# Patient Record
Sex: FEMALE | Race: WHITE | NOT HISPANIC OR LATINO | Employment: OTHER | URBAN - METROPOLITAN AREA
[De-identification: names, ages, dates, MRNs, and addresses within clinical notes are randomized per-mention and may not be internally consistent; named-entity substitution may affect disease eponyms.]

---

## 2017-03-20 ENCOUNTER — HISTORICAL (OUTPATIENT)
Dept: LAB | Facility: HOSPITAL | Age: 82
End: 2017-03-20

## 2017-08-31 ENCOUNTER — HISTORICAL (OUTPATIENT)
Dept: LAB | Facility: HOSPITAL | Age: 82
End: 2017-08-31

## 2017-08-31 LAB
ABS NEUT (OLG): 4.7 X10(3)/MCL (ref 2.1–9.2)
ALBUMIN SERPL-MCNC: 3.7 GM/DL (ref 3.4–5)
ALBUMIN/GLOB SERPL: 1.3 RATIO (ref 1.1–2)
ALP SERPL-CCNC: 78 UNIT/L (ref 46–116)
ALT SERPL-CCNC: 15 UNIT/L (ref 12–78)
AST SERPL-CCNC: 13 UNIT/L (ref 15–37)
BASOPHILS # BLD AUTO: 0.1 X10(3)/MCL
BASOPHILS NFR BLD AUTO: 1 % (ref 0–2)
BILIRUB SERPL-MCNC: 0.5 MG/DL (ref 0.2–1)
BILIRUBIN DIRECT+TOT PNL SERPL-MCNC: 0.13 MG/DL (ref 0–0.2)
BILIRUBIN DIRECT+TOT PNL SERPL-MCNC: 0.37 MG/DL (ref 0–0.8)
BUN SERPL-MCNC: 16.2 MG/DL (ref 7–18)
CALCIUM SERPL-MCNC: 9.5 MG/DL (ref 8.5–10.1)
CHLORIDE SERPL-SCNC: 109 MMOL/L (ref 98–107)
CHOLEST SERPL-MCNC: 163 MG/DL (ref 0–200)
CHOLEST/HDLC SERPL: 2.3 {RATIO} (ref 0–4)
CO2 SERPL-SCNC: 26.8 MMOL/L (ref 21–32)
CREAT SERPL-MCNC: 0.94 MG/DL (ref 0.6–1.3)
DEPRECATED CALCIDIOL+CALCIFEROL SERPL-MC: 34.9 NG/ML (ref 30–80)
EOSINOPHIL # BLD AUTO: 0.1 X10(3)/MCL
EOSINOPHIL NFR BLD AUTO: 1 %
ERYTHROCYTE [DISTWIDTH] IN BLOOD BY AUTOMATED COUNT: 16.4 % (ref 11.5–17)
EST. AVERAGE GLUCOSE BLD GHB EST-MCNC: 111 MG/DL
GLOBULIN SER-MCNC: 2.9 GM/DL (ref 2.4–3.5)
GLUCOSE SERPL-MCNC: 86 MG/DL (ref 74–106)
HBA1C MFR BLD: 5.5 % (ref 4.5–6.2)
HCT VFR BLD AUTO: 36.7 % (ref 37–47)
HDLC SERPL-MCNC: 72 MG/DL (ref 40–60)
HGB BLD-MCNC: 12.2 GM/DL (ref 12–16)
LDLC SERPL CALC-MCNC: 71 MG/DL (ref 0–129)
LYMPHOCYTES # BLD AUTO: 2.3 X10(3)/MCL
LYMPHOCYTES NFR BLD AUTO: 30 % (ref 13–40)
MCH RBC QN AUTO: 30.2 PG (ref 27–31)
MCHC RBC AUTO-ENTMCNC: 33.2 GM/DL (ref 33–36)
MCV RBC AUTO: 91.1 FL (ref 80–94)
MONOCYTES # BLD AUTO: 0.7 X10(3)/MCL
MONOCYTES NFR BLD AUTO: 9 % (ref 2–11)
NEUTROPHILS # BLD AUTO: 4.7 X10(3)/MCL (ref 2.1–9.2)
NEUTROPHILS NFR BLD AUTO: 60 % (ref 47–80)
PLATELET # BLD AUTO: 245 X10(3)/MCL (ref 130–400)
PMV BLD AUTO: 8 FL (ref 7.4–10.4)
POTASSIUM SERPL-SCNC: 4 MMOL/L (ref 3.5–5.1)
PROT SERPL-MCNC: 6.6 GM/DL (ref 6.4–8.2)
RBC # BLD AUTO: 4.03 X10(6)/MCL (ref 4.2–5.4)
SODIUM SERPL-SCNC: 146 MMOL/L (ref 136–145)
T4 FREE SERPL-MCNC: 1.03 NG/DL (ref 0.76–1.46)
TRIGL SERPL-MCNC: 101 MG/DL
TSH SERPL-ACNC: 1.02 MIU/ML (ref 0.36–3.74)
VLDLC SERPL CALC-MCNC: 20 MG/DL
WBC # SPEC AUTO: 7.9 X10(3)/MCL (ref 4.5–11.5)

## 2017-10-12 ENCOUNTER — HISTORICAL (OUTPATIENT)
Dept: LAB | Facility: HOSPITAL | Age: 82
End: 2017-10-12

## 2017-10-12 LAB
BUN SERPL-MCNC: 17.5 MG/DL (ref 7–18)
CALCIUM SERPL-MCNC: 9.7 MG/DL (ref 8.5–10.1)
CHLORIDE SERPL-SCNC: 106 MMOL/L (ref 98–107)
CO2 SERPL-SCNC: 28.3 MMOL/L (ref 21–32)
CREAT SERPL-MCNC: 0.98 MG/DL (ref 0.6–1.3)
GLUCOSE SERPL-MCNC: 98 MG/DL (ref 74–106)
POTASSIUM SERPL-SCNC: 3.7 MMOL/L (ref 3.5–5.1)
SODIUM SERPL-SCNC: 143 MMOL/L (ref 136–145)

## 2017-10-25 ENCOUNTER — HISTORICAL (OUTPATIENT)
Dept: LAB | Facility: HOSPITAL | Age: 82
End: 2017-10-25

## 2017-10-25 LAB
BUN SERPL-MCNC: 19.1 MG/DL (ref 7–18)
CALCIUM SERPL-MCNC: 9.8 MG/DL (ref 8.5–10.1)
CHLORIDE SERPL-SCNC: 104 MMOL/L (ref 98–107)
CO2 SERPL-SCNC: 27.2 MMOL/L (ref 21–32)
CREAT SERPL-MCNC: 1.25 MG/DL (ref 0.6–1.3)
ERYTHROCYTE [DISTWIDTH] IN BLOOD BY AUTOMATED COUNT: 15.7 % (ref 11.5–17)
GLUCOSE SERPL-MCNC: 97 MG/DL (ref 74–106)
HCT VFR BLD AUTO: 33.4 % (ref 37–47)
HGB BLD-MCNC: 10.9 GM/DL (ref 12–16)
IRON SATN MFR SERPL: 5.7 % (ref 20–50)
IRON SERPL-MCNC: 26 MCG/DL (ref 50–175)
MCH RBC QN AUTO: 29.2 PG (ref 27–31)
MCHC RBC AUTO-ENTMCNC: 32.8 GM/DL (ref 33–36)
MCV RBC AUTO: 89.2 FL (ref 80–94)
PLATELET # BLD AUTO: 328 X10(3)/MCL (ref 130–400)
PMV BLD AUTO: 7.5 FL (ref 7.4–10.4)
POTASSIUM SERPL-SCNC: 4 MMOL/L (ref 3.5–5.1)
RBC # BLD AUTO: 3.74 X10(6)/MCL (ref 4.2–5.4)
SODIUM SERPL-SCNC: 141 MMOL/L (ref 136–145)
TIBC SERPL-MCNC: 461 MCG/DL (ref 250–450)
TRANSFERRIN SERPL-MCNC: 331 MG/DL (ref 200–360)
WBC # SPEC AUTO: 7.9 X10(3)/MCL (ref 4.5–11.5)

## 2017-11-14 ENCOUNTER — HISTORICAL (OUTPATIENT)
Dept: LAB | Facility: HOSPITAL | Age: 82
End: 2017-11-14

## 2017-11-14 LAB
BUN SERPL-MCNC: 21 MG/DL (ref 7–18)
CALCIUM SERPL-MCNC: 9.7 MG/DL (ref 8.5–10.1)
CHLORIDE SERPL-SCNC: 103 MMOL/L (ref 98–107)
CO2 SERPL-SCNC: 29.9 MMOL/L (ref 21–32)
CREAT SERPL-MCNC: 1.28 MG/DL (ref 0.6–1.3)
GLUCOSE SERPL-MCNC: 98 MG/DL (ref 74–106)
POTASSIUM SERPL-SCNC: 3.6 MMOL/L (ref 3.5–5.1)
SODIUM SERPL-SCNC: 141 MMOL/L (ref 136–145)

## 2017-12-14 ENCOUNTER — HISTORICAL (OUTPATIENT)
Dept: LAB | Facility: HOSPITAL | Age: 82
End: 2017-12-14

## 2017-12-14 LAB
BUN SERPL-MCNC: 45.5 MG/DL (ref 7–18)
CALCIUM SERPL-MCNC: 10 MG/DL (ref 8.5–10.1)
CHLORIDE SERPL-SCNC: 104 MMOL/L (ref 98–107)
CO2 SERPL-SCNC: 29.9 MMOL/L (ref 21–32)
CREAT SERPL-MCNC: 1.87 MG/DL (ref 0.6–1.3)
GLUCOSE SERPL-MCNC: 123 MG/DL (ref 74–106)
MAGNESIUM SERPL-MCNC: 1.5 MG/DL (ref 1.8–2.4)
POTASSIUM SERPL-SCNC: 4 MMOL/L (ref 3.5–5.1)
SODIUM SERPL-SCNC: 144 MMOL/L (ref 136–145)

## 2018-01-15 ENCOUNTER — HISTORICAL (OUTPATIENT)
Dept: LAB | Facility: HOSPITAL | Age: 83
End: 2018-01-15

## 2018-01-15 LAB
BUN SERPL-MCNC: 86.8 MG/DL (ref 7–18)
CALCIUM SERPL-MCNC: 9.6 MG/DL (ref 8.5–10.1)
CHLORIDE SERPL-SCNC: 106 MMOL/L (ref 98–107)
CO2 SERPL-SCNC: 26.1 MMOL/L (ref 21–32)
CREAT SERPL-MCNC: 3.61 MG/DL (ref 0.6–1.3)
GLUCOSE SERPL-MCNC: 108 MG/DL (ref 74–106)
MAGNESIUM SERPL-MCNC: 1.9 MG/DL (ref 1.8–2.4)
POTASSIUM SERPL-SCNC: 3.8 MMOL/L (ref 3.5–5.1)
SODIUM SERPL-SCNC: 143 MMOL/L (ref 136–145)

## 2018-01-31 LAB
ABS NEUT (OLG): 5.6 X10(3)/MCL (ref 2.1–9.2)
BASOPHILS # BLD AUTO: 0.1 X10(3)/MCL
BASOPHILS NFR BLD AUTO: 1 % (ref 0–2)
BUN SERPL-MCNC: 28.3 MG/DL (ref 7–18)
CALCIUM SERPL-MCNC: 9.5 MG/DL (ref 8.5–10.1)
CHLORIDE SERPL-SCNC: 102 MMOL/L (ref 98–107)
CO2 SERPL-SCNC: 35.1 MMOL/L (ref 21–32)
CREAT SERPL-MCNC: 1.22 MG/DL (ref 0.6–1.3)
DIGOXIN SERPL-MCNC: 1 NG/ML (ref 0.9–2)
EOSINOPHIL # BLD AUTO: 0.1 X10(3)/MCL
EOSINOPHIL NFR BLD AUTO: 1 %
ERYTHROCYTE [DISTWIDTH] IN BLOOD BY AUTOMATED COUNT: 21.9 % (ref 11.5–17)
GLUCOSE SERPL-MCNC: 99 MG/DL (ref 74–106)
HCT VFR BLD AUTO: 39.1 % (ref 37–47)
HGB BLD-MCNC: 12.5 GM/DL (ref 12–16)
LYMPHOCYTES # BLD AUTO: 1.7 X10(3)/MCL
LYMPHOCYTES NFR BLD AUTO: 21 % (ref 13–40)
MAGNESIUM SERPL-MCNC: 1.6 MG/DL (ref 1.8–2.4)
MCH RBC QN AUTO: 27.7 PG (ref 27–31)
MCHC RBC AUTO-ENTMCNC: 32 GM/DL (ref 33–36)
MCV RBC AUTO: 86.6 FL (ref 80–94)
MONOCYTES # BLD AUTO: 0.5 X10(3)/MCL
MONOCYTES NFR BLD AUTO: 7 % (ref 2–11)
NEUTROPHILS # BLD AUTO: 5.6 X10(3)/MCL (ref 2.1–9.2)
NEUTROPHILS NFR BLD AUTO: 71 % (ref 47–80)
PLATELET # BLD AUTO: 162 X10(3)/MCL (ref 130–400)
PMV BLD AUTO: 9.2 FL (ref 7.4–10.4)
POTASSIUM SERPL-SCNC: 4.8 MMOL/L (ref 3.5–5.1)
RBC # BLD AUTO: 4.51 X10(6)/MCL (ref 4.2–5.4)
SODIUM SERPL-SCNC: 140 MMOL/L (ref 136–145)
WBC # SPEC AUTO: 8.2 X10(3)/MCL (ref 4.5–11.5)

## 2018-02-01 ENCOUNTER — HISTORICAL (OUTPATIENT)
Dept: ADMINISTRATIVE | Facility: HOSPITAL | Age: 83
End: 2018-02-01

## 2018-02-01 LAB
ABS NEUT (OLG): 4.9 X10(3)/MCL (ref 2.1–9.2)
ALBUMIN SERPL-MCNC: 3.1 GM/DL (ref 3.4–5)
ALBUMIN/GLOB SERPL: 1.2 RATIO (ref 1.1–2)
ALP SERPL-CCNC: 69 UNIT/L (ref 46–116)
ALT SERPL-CCNC: 23 UNIT/L (ref 12–78)
AST SERPL-CCNC: 21 UNIT/L (ref 15–37)
BASOPHILS # BLD AUTO: 0.1 X10(3)/MCL
BASOPHILS NFR BLD AUTO: 1 % (ref 0–2)
BILIRUB SERPL-MCNC: 1.7 MG/DL (ref 0.2–1)
BILIRUBIN DIRECT+TOT PNL SERPL-MCNC: 0.61 MG/DL (ref 0–0.8)
BILIRUBIN DIRECT+TOT PNL SERPL-MCNC: 1.09 MG/DL (ref 0–0.2)
BUN SERPL-MCNC: 29.8 MG/DL (ref 7–18)
CALCIUM SERPL-MCNC: 9.4 MG/DL (ref 8.5–10.1)
CHLORIDE SERPL-SCNC: 101 MMOL/L (ref 98–107)
CO2 SERPL-SCNC: 34.6 MMOL/L (ref 21–32)
CREAT SERPL-MCNC: 1.19 MG/DL (ref 0.6–1.3)
EOSINOPHIL # BLD AUTO: 0.1 X10(3)/MCL
EOSINOPHIL NFR BLD AUTO: 1 %
ERYTHROCYTE [DISTWIDTH] IN BLOOD BY AUTOMATED COUNT: 21.8 % (ref 11.5–17)
GLOBULIN SER-MCNC: 2.6 GM/DL (ref 2.4–3.5)
GLUCOSE SERPL-MCNC: 101 MG/DL (ref 74–106)
HCT VFR BLD AUTO: 37.6 % (ref 37–47)
HGB BLD-MCNC: 12.1 GM/DL (ref 12–16)
INR PPP: 1.31 (ref 2–3)
LYMPHOCYTES # BLD AUTO: 1.4 X10(3)/MCL
LYMPHOCYTES NFR BLD AUTO: 20 % (ref 13–40)
MCH RBC QN AUTO: 27.6 PG (ref 27–31)
MCHC RBC AUTO-ENTMCNC: 32.2 GM/DL (ref 33–36)
MCV RBC AUTO: 85.7 FL (ref 80–94)
MONOCYTES # BLD AUTO: 0.5 X10(3)/MCL
MONOCYTES NFR BLD AUTO: 7 % (ref 2–11)
NEUTROPHILS # BLD AUTO: 4.9 X10(3)/MCL (ref 2.1–9.2)
NEUTROPHILS NFR BLD AUTO: 71 % (ref 47–80)
PLATELET # BLD AUTO: 183 X10(3)/MCL (ref 130–400)
PMV BLD AUTO: 9.1 FL (ref 7.4–10.4)
POTASSIUM SERPL-SCNC: 3.8 MMOL/L (ref 3.5–5.1)
PROT SERPL-MCNC: 5.7 GM/DL (ref 6.4–8.2)
PROTHROMBIN TIME: 13.6 SECOND(S) (ref 9.3–11.4)
RBC # BLD AUTO: 4.38 X10(6)/MCL (ref 4.2–5.4)
SODIUM SERPL-SCNC: 139 MMOL/L (ref 136–145)
WBC # SPEC AUTO: 6.9 X10(3)/MCL (ref 4.5–11.5)

## 2018-02-05 LAB
ABS NEUT (OLG): 5 X10(3)/MCL (ref 2.1–9.2)
ALBUMIN SERPL-MCNC: 3.1 GM/DL (ref 3.4–5)
ALBUMIN/GLOB SERPL: 1.1 RATIO (ref 1.1–2)
ALP SERPL-CCNC: 80 UNIT/L (ref 46–116)
ALT SERPL-CCNC: 23 UNIT/L (ref 12–78)
ANISOCYTOSIS BLD QL SMEAR: NORMAL
AST SERPL-CCNC: 27 UNIT/L (ref 15–37)
BASOPHILS # BLD AUTO: 0.1 X10(3)/MCL
BASOPHILS NFR BLD AUTO: 1 % (ref 0–2)
BILIRUB SERPL-MCNC: 1.6 MG/DL (ref 0.2–1)
BILIRUBIN DIRECT+TOT PNL SERPL-MCNC: 0.58 MG/DL (ref 0–0.8)
BILIRUBIN DIRECT+TOT PNL SERPL-MCNC: 1 MG/DL (ref 0–0.2)
BNP BLD-MCNC: ABNORMAL PG/ML (ref 0–125)
BUN SERPL-MCNC: 23.1 MG/DL (ref 7–18)
CALCIUM SERPL-MCNC: 9.4 MG/DL (ref 8.5–10.1)
CHLORIDE SERPL-SCNC: 104 MMOL/L (ref 98–107)
CO2 SERPL-SCNC: 34 MMOL/L (ref 21–32)
CREAT SERPL-MCNC: 1.05 MG/DL (ref 0.6–1.3)
EOSINOPHIL NFR BLD AUTO: 0 %
ERYTHROCYTE [DISTWIDTH] IN BLOOD BY AUTOMATED COUNT: 21.6 % (ref 11.5–17)
GLOBULIN SER-MCNC: 2.7 GM/DL (ref 2.4–3.5)
GLUCOSE SERPL-MCNC: 88 MG/DL (ref 74–106)
HCT VFR BLD AUTO: 37.8 % (ref 37–47)
HGB BLD-MCNC: 12.2 GM/DL (ref 12–16)
LYMPHOCYTES # BLD AUTO: 1.5 X10(3)/MCL
LYMPHOCYTES NFR BLD AUTO: 20 % (ref 13–40)
MAGNESIUM SERPL-MCNC: 1.6 MG/DL (ref 1.8–2.4)
MCH RBC QN AUTO: 28.1 PG (ref 27–31)
MCHC RBC AUTO-ENTMCNC: 32.2 GM/DL (ref 33–36)
MCV RBC AUTO: 87.1 FL (ref 80–94)
MONOCYTES # BLD AUTO: 0.8 X10(3)/MCL
MONOCYTES NFR BLD AUTO: 11 % (ref 2–11)
NEUTROPHILS # BLD AUTO: 5 X10(3)/MCL (ref 2.1–9.2)
NEUTROPHILS NFR BLD AUTO: 68 % (ref 47–80)
PLATELET # BLD AUTO: 224 X10(3)/MCL (ref 130–400)
PLATELET # BLD EST: ADEQUATE 10*3/UL
PMV BLD AUTO: 8.6 FL (ref 7.4–10.4)
POTASSIUM SERPL-SCNC: 4 MMOL/L (ref 3.5–5.1)
PROT SERPL-MCNC: 5.8 GM/DL (ref 6.4–8.2)
RBC # BLD AUTO: 4.34 X10(6)/MCL (ref 4.2–5.4)
SODIUM SERPL-SCNC: 142 MMOL/L (ref 136–145)
WBC # SPEC AUTO: 7.4 X10(3)/MCL (ref 4.5–11.5)

## 2018-02-06 LAB
BUN SERPL-MCNC: 25 MG/DL (ref 7–18)
CALCIUM SERPL-MCNC: 9.3 MG/DL (ref 8.5–10.1)
CHLORIDE SERPL-SCNC: 102 MMOL/L (ref 98–107)
CO2 SERPL-SCNC: 35.5 MMOL/L (ref 21–32)
CREAT SERPL-MCNC: 1.11 MG/DL (ref 0.6–1.3)
GLUCOSE SERPL-MCNC: 92 MG/DL (ref 74–106)
POTASSIUM SERPL-SCNC: 3.6 MMOL/L (ref 3.5–5.1)
SODIUM SERPL-SCNC: 144 MMOL/L (ref 136–145)

## 2018-02-07 LAB
BNP BLD-MCNC: ABNORMAL PG/ML (ref 0–125)
BUN SERPL-MCNC: 26 MG/DL (ref 7–18)
CALCIUM SERPL-MCNC: 9.2 MG/DL (ref 8.5–10.1)
CHLORIDE SERPL-SCNC: 101 MMOL/L (ref 98–107)
CO2 SERPL-SCNC: 33.8 MMOL/L (ref 21–32)
CREAT SERPL-MCNC: 1.02 MG/DL (ref 0.6–1.3)
GLUCOSE SERPL-MCNC: 81 MG/DL (ref 74–106)
MAGNESIUM SERPL-MCNC: 1.3 MG/DL (ref 1.8–2.4)
POTASSIUM SERPL-SCNC: 3.8 MMOL/L (ref 3.5–5.1)
SODIUM SERPL-SCNC: 143 MMOL/L (ref 136–145)

## 2018-02-08 LAB
BUN SERPL-MCNC: 30.8 MG/DL (ref 7–18)
CALCIUM SERPL-MCNC: 9.6 MG/DL (ref 8.5–10.1)
CHLORIDE SERPL-SCNC: 103 MMOL/L (ref 98–107)
CO2 SERPL-SCNC: 35.7 MMOL/L (ref 21–32)
CREAT SERPL-MCNC: 1.1 MG/DL (ref 0.6–1.3)
GLUCOSE SERPL-MCNC: 86 MG/DL (ref 74–106)
MAGNESIUM SERPL-MCNC: 1.9 MG/DL (ref 1.8–2.4)
POTASSIUM SERPL-SCNC: 3.9 MMOL/L (ref 3.5–5.1)
SODIUM SERPL-SCNC: 144 MMOL/L (ref 136–145)

## 2018-02-21 ENCOUNTER — HISTORICAL (OUTPATIENT)
Dept: LAB | Facility: HOSPITAL | Age: 83
End: 2018-02-21

## 2018-02-21 LAB
ABS NEUT (OLG): 4.68 X10(3)/MCL (ref 2.1–9.2)
ALBUMIN SERPL-MCNC: 3.5 GM/DL (ref 3.4–5)
ALBUMIN/GLOB SERPL: 1.1 RATIO (ref 1.1–2)
ALP SERPL-CCNC: 81 UNIT/L (ref 38–126)
ALT SERPL-CCNC: 19 UNIT/L (ref 12–78)
AST SERPL-CCNC: 24 UNIT/L (ref 15–37)
BASOPHILS # BLD AUTO: 0 X10(3)/MCL (ref 0–0.2)
BASOPHILS NFR BLD AUTO: 1 %
BILIRUB SERPL-MCNC: 0.8 MG/DL (ref 0.2–1)
BILIRUBIN DIRECT+TOT PNL SERPL-MCNC: 0.3 MG/DL (ref 0–0.8)
BILIRUBIN DIRECT+TOT PNL SERPL-MCNC: 0.5 MG/DL (ref 0–0.5)
BUN SERPL-MCNC: 36 MG/DL (ref 7–18)
CALCIUM SERPL-MCNC: 9.8 MG/DL (ref 8.5–10.1)
CHLORIDE SERPL-SCNC: 101 MMOL/L (ref 98–107)
CO2 SERPL-SCNC: 27 MMOL/L (ref 21–32)
CREAT SERPL-MCNC: 1.68 MG/DL (ref 0.55–1.02)
EOSINOPHIL # BLD AUTO: 0.1 X10(3)/MCL (ref 0–0.9)
EOSINOPHIL NFR BLD AUTO: 1 %
ERYTHROCYTE [DISTWIDTH] IN BLOOD BY AUTOMATED COUNT: 20.5 % (ref 11.5–17)
GLOBULIN SER-MCNC: 3.3 GM/DL (ref 2.4–3.5)
GLUCOSE SERPL-MCNC: 83 MG/DL (ref 74–106)
HCT VFR BLD AUTO: 40.1 % (ref 37–47)
HGB BLD-MCNC: 12.8 GM/DL (ref 12–16)
LYMPHOCYTES # BLD AUTO: 2.5 X10(3)/MCL (ref 0.6–4.6)
LYMPHOCYTES NFR BLD AUTO: 31 %
MCH RBC QN AUTO: 27.6 PG (ref 27–31)
MCHC RBC AUTO-ENTMCNC: 31.9 GM/DL (ref 33–36)
MCV RBC AUTO: 86.6 FL (ref 80–94)
MONOCYTES # BLD AUTO: 0.7 X10(3)/MCL (ref 0.1–1.3)
MONOCYTES NFR BLD AUTO: 9 %
NEUTROPHILS # BLD AUTO: 4.68 X10(3)/MCL (ref 1.4–7.9)
NEUTROPHILS NFR BLD AUTO: 58 %
PLATELET # BLD AUTO: 283 X10(3)/MCL (ref 130–400)
PMV BLD AUTO: 10.7 FL (ref 9.4–12.4)
POTASSIUM SERPL-SCNC: 4.8 MMOL/L (ref 3.5–5.1)
PROT SERPL-MCNC: 6.8 GM/DL (ref 6.4–8.2)
RBC # BLD AUTO: 4.63 X10(6)/MCL (ref 4.2–5.4)
SODIUM SERPL-SCNC: 137 MMOL/L (ref 136–145)
T4 FREE SERPL-MCNC: 1.05 NG/DL (ref 0.76–1.46)
TSH SERPL-ACNC: 2.01 MIU/ML (ref 0.36–3.74)
WBC # SPEC AUTO: 8.1 X10(3)/MCL (ref 4.5–11.5)

## 2018-03-02 ENCOUNTER — HISTORICAL (OUTPATIENT)
Dept: LAB | Facility: HOSPITAL | Age: 83
End: 2018-03-02

## 2018-03-02 LAB
ALBUMIN SERPL-MCNC: 3.6 GM/DL (ref 3.4–5)
ALBUMIN/GLOB SERPL: 1.1 RATIO (ref 1.1–2)
ALP SERPL-CCNC: 82 UNIT/L (ref 46–116)
ALT SERPL-CCNC: 27 UNIT/L (ref 12–78)
AST SERPL-CCNC: 24 UNIT/L (ref 15–37)
BILIRUB SERPL-MCNC: 0.9 MG/DL (ref 0.2–1)
BILIRUBIN DIRECT+TOT PNL SERPL-MCNC: 0.42 MG/DL (ref 0–0.8)
BILIRUBIN DIRECT+TOT PNL SERPL-MCNC: 0.44 MG/DL (ref 0–0.2)
BUN SERPL-MCNC: 58.9 MG/DL (ref 7–18)
CALCIUM SERPL-MCNC: 10.1 MG/DL (ref 8.5–10.1)
CHLORIDE SERPL-SCNC: 102 MMOL/L (ref 98–107)
CO2 SERPL-SCNC: 29.8 MMOL/L (ref 21–32)
CREAT SERPL-MCNC: 1.5 MG/DL (ref 0.6–1.3)
GLOBULIN SER-MCNC: 3.4 GM/DL (ref 2.4–3.5)
GLUCOSE SERPL-MCNC: 79 MG/DL (ref 74–106)
POTASSIUM SERPL-SCNC: 5.1 MMOL/L (ref 3.5–5.1)
PROT SERPL-MCNC: 7 GM/DL (ref 6.4–8.2)
SODIUM SERPL-SCNC: 138 MMOL/L (ref 136–145)

## 2018-03-08 ENCOUNTER — HISTORICAL (OUTPATIENT)
Dept: LAB | Facility: HOSPITAL | Age: 83
End: 2018-03-08

## 2018-03-08 LAB
BUN SERPL-MCNC: 49.3 MG/DL (ref 7–18)
CALCIUM SERPL-MCNC: 10.3 MG/DL (ref 8.5–10.1)
CHLORIDE SERPL-SCNC: 102 MMOL/L (ref 98–107)
CO2 SERPL-SCNC: 29.4 MMOL/L (ref 21–32)
CREAT SERPL-MCNC: 1.38 MG/DL (ref 0.6–1.3)
CREAT/UREA NIT SERPL: 36
GLUCOSE SERPL-MCNC: 88 MG/DL (ref 74–106)
POTASSIUM SERPL-SCNC: 4.7 MMOL/L (ref 3.5–5.1)
SODIUM SERPL-SCNC: 139 MMOL/L (ref 136–145)

## 2018-04-05 ENCOUNTER — HISTORICAL (OUTPATIENT)
Dept: LAB | Facility: HOSPITAL | Age: 83
End: 2018-04-05

## 2018-04-05 LAB
ABS NEUT (OLG): 5.5 X10(3)/MCL (ref 2.1–9.2)
ALBUMIN SERPL-MCNC: 3.5 GM/DL (ref 3.4–5)
ALBUMIN/GLOB SERPL: 1.2 RATIO (ref 1.1–2)
ALP SERPL-CCNC: 56 UNIT/L (ref 46–116)
ALT SERPL-CCNC: 23 UNIT/L (ref 12–78)
AST SERPL-CCNC: 20 UNIT/L (ref 15–37)
BASOPHILS # BLD AUTO: 0.1 X10(3)/MCL
BASOPHILS NFR BLD AUTO: 1 % (ref 0–2)
BILIRUB SERPL-MCNC: 0.7 MG/DL (ref 0.2–1)
BILIRUBIN DIRECT+TOT PNL SERPL-MCNC: 0.28 MG/DL (ref 0–0.2)
BILIRUBIN DIRECT+TOT PNL SERPL-MCNC: 0.42 MG/DL (ref 0–0.8)
BUN SERPL-MCNC: 46.7 MG/DL (ref 7–18)
CALCIUM SERPL-MCNC: 10 MG/DL (ref 8.5–10.1)
CHLORIDE SERPL-SCNC: 104 MMOL/L (ref 98–107)
CO2 SERPL-SCNC: 28.4 MMOL/L (ref 21–32)
CREAT SERPL-MCNC: 1.4 MG/DL (ref 0.6–1.3)
EOSINOPHIL # BLD AUTO: 0.1 X10(3)/MCL
EOSINOPHIL NFR BLD AUTO: 1 %
ERYTHROCYTE [DISTWIDTH] IN BLOOD BY AUTOMATED COUNT: 22.8 % (ref 11.5–17)
GLOBULIN SER-MCNC: 3 GM/DL (ref 2.4–3.5)
GLUCOSE SERPL-MCNC: 99 MG/DL (ref 74–106)
HCT VFR BLD AUTO: 43.1 % (ref 37–47)
HGB BLD-MCNC: 13.8 GM/DL (ref 12–16)
LYMPHOCYTES # BLD AUTO: 2.8 X10(3)/MCL
LYMPHOCYTES NFR BLD AUTO: 30 % (ref 13–40)
MCH RBC QN AUTO: 28.9 PG (ref 27–31)
MCHC RBC AUTO-ENTMCNC: 32 GM/DL (ref 33–36)
MCV RBC AUTO: 90.4 FL (ref 80–94)
MONOCYTES # BLD AUTO: 0.8 X10(3)/MCL
MONOCYTES NFR BLD AUTO: 8 % (ref 2–11)
NEUTROPHILS # BLD AUTO: 5.5 X10(3)/MCL (ref 2.1–9.2)
NEUTROPHILS NFR BLD AUTO: 60 % (ref 47–80)
PLATELET # BLD AUTO: 219 X10(3)/MCL (ref 130–400)
PMV BLD AUTO: 8.9 FL (ref 7.4–10.4)
POTASSIUM SERPL-SCNC: 4.8 MMOL/L (ref 3.5–5.1)
PROT SERPL-MCNC: 6.5 GM/DL (ref 6.4–8.2)
RBC # BLD AUTO: 4.77 X10(6)/MCL (ref 4.2–5.4)
SODIUM SERPL-SCNC: 139 MMOL/L (ref 136–145)
T4 FREE SERPL-MCNC: 1.06 NG/DL (ref 0.76–1.46)
TSH SERPL-ACNC: 0.66 MIU/ML (ref 0.36–3.74)
WBC # SPEC AUTO: 9.3 X10(3)/MCL (ref 4.5–11.5)

## 2018-04-19 ENCOUNTER — HISTORICAL (OUTPATIENT)
Dept: LAB | Facility: HOSPITAL | Age: 83
End: 2018-04-19

## 2018-04-19 LAB
BUN SERPL-MCNC: 40.6 MG/DL (ref 7–18)
CALCIUM SERPL-MCNC: 9.6 MG/DL (ref 8.5–10.1)
CHLORIDE SERPL-SCNC: 104 MMOL/L (ref 98–107)
CO2 SERPL-SCNC: 26.4 MMOL/L (ref 21–32)
CREAT SERPL-MCNC: 1.54 MG/DL (ref 0.6–1.3)
CREAT/UREA NIT SERPL: 26
GLUCOSE SERPL-MCNC: 101 MG/DL (ref 74–106)
MAGNESIUM SERPL-MCNC: 1.9 MG/DL (ref 1.8–2.4)
POTASSIUM SERPL-SCNC: 4 MMOL/L (ref 3.5–5.1)
SODIUM SERPL-SCNC: 139 MMOL/L (ref 136–145)

## 2018-07-30 ENCOUNTER — HISTORICAL (OUTPATIENT)
Dept: LAB | Facility: HOSPITAL | Age: 83
End: 2018-07-30

## 2018-07-30 LAB
ABS NEUT (OLG): 3.6 X10(3)/MCL (ref 2.1–9.2)
ALBUMIN SERPL-MCNC: 3.7 GM/DL (ref 3.4–5)
ALBUMIN/GLOB SERPL: 1.2 RATIO (ref 1.1–2)
ALP SERPL-CCNC: 64 UNIT/L (ref 46–116)
ALT SERPL-CCNC: 11 UNIT/L (ref 12–78)
APPEARANCE, UA: CLEAR
AST SERPL-CCNC: 10 UNIT/L (ref 15–37)
BACTERIA SPEC CULT: ABNORMAL
BASOPHILS # BLD AUTO: 0.1 X10(3)/MCL
BASOPHILS NFR BLD AUTO: 1 % (ref 0–2)
BILIRUB SERPL-MCNC: 0.6 MG/DL (ref 0.2–1)
BILIRUB UR QL STRIP: NEGATIVE
BILIRUBIN DIRECT+TOT PNL SERPL-MCNC: 0.21 MG/DL (ref 0–0.2)
BILIRUBIN DIRECT+TOT PNL SERPL-MCNC: 0.39 MG/DL (ref 0–0.8)
BUN SERPL-MCNC: 51.3 MG/DL (ref 7–18)
CALCIUM SERPL-MCNC: 9.9 MG/DL (ref 8.5–10.1)
CHLORIDE SERPL-SCNC: 103 MMOL/L (ref 98–107)
CHOLEST SERPL-MCNC: 143 MG/DL (ref 0–200)
CHOLEST/HDLC SERPL: 2.1 {RATIO} (ref 0–4)
CO2 SERPL-SCNC: 27.1 MMOL/L (ref 21–32)
COLOR UR: YELLOW
CREAT SERPL-MCNC: 1.62 MG/DL (ref 0.6–1.3)
CREAT UR-MCNC: 44 MG/DL (ref 30–125)
EOSINOPHIL # BLD AUTO: 0.1 X10(3)/MCL
EOSINOPHIL NFR BLD AUTO: 2 %
ERYTHROCYTE [DISTWIDTH] IN BLOOD BY AUTOMATED COUNT: 17.9 % (ref 11.5–17)
EST. AVERAGE GLUCOSE BLD GHB EST-MCNC: 120 MG/DL
GLOBULIN SER-MCNC: 3.1 GM/DL (ref 2.4–3.5)
GLUCOSE (UA): NEGATIVE
GLUCOSE SERPL-MCNC: 89 MG/DL (ref 74–106)
HBA1C MFR BLD: 5.8 % (ref 4.5–6.2)
HCT VFR BLD AUTO: 39.1 % (ref 37–47)
HDLC SERPL-MCNC: 68 MG/DL (ref 40–60)
HGB BLD-MCNC: 12.9 GM/DL (ref 12–16)
HGB UR QL STRIP: ABNORMAL
KETONES UR QL STRIP: NEGATIVE
LDLC SERPL CALC-MCNC: 60 MG/DL (ref 0–129)
LEUKOCYTE ESTERASE UR QL STRIP: ABNORMAL
LYMPHOCYTES # BLD AUTO: 2.2 X10(3)/MCL
LYMPHOCYTES NFR BLD AUTO: 33 % (ref 13–40)
MCH RBC QN AUTO: 32.1 PG (ref 27–31)
MCHC RBC AUTO-ENTMCNC: 33 GM/DL (ref 33–36)
MCV RBC AUTO: 97.2 FL (ref 80–94)
MICROALBUMIN UR-MCNC: <0.1 MG/DL (ref 0–3)
MICROALBUMIN/CREAT RATIO PNL UR: <2.3 MG/GM CR (ref 0–30)
MONOCYTES # BLD AUTO: 0.8 X10(3)/MCL
MONOCYTES NFR BLD AUTO: 11 % (ref 2–11)
NEUTROPHILS # BLD AUTO: 3.6 X10(3)/MCL (ref 2.1–9.2)
NEUTROPHILS NFR BLD AUTO: 53 % (ref 47–80)
NITRITE UR QL STRIP: NEGATIVE
PH UR STRIP: 5.5 [PH] (ref 5–9)
PLATELET # BLD AUTO: 276 X10(3)/MCL (ref 130–400)
PMV BLD AUTO: 8 FL (ref 7.4–10.4)
POTASSIUM SERPL-SCNC: 5.2 MMOL/L (ref 3.5–5.1)
PROT SERPL-MCNC: 6.8 GM/DL (ref 6.4–8.2)
PROT UR QL STRIP: NEGATIVE
RBC # BLD AUTO: 4.02 X10(6)/MCL (ref 4.2–5.4)
RBC #/AREA URNS HPF: ABNORMAL /[HPF]
SODIUM SERPL-SCNC: 138 MMOL/L (ref 136–145)
SP GR UR STRIP: <=1.005 (ref 1–1.03)
SQUAMOUS EPITHELIAL, UA: ABNORMAL
T4 FREE SERPL-MCNC: 1.2 NG/DL (ref 0.76–1.46)
TRIGL SERPL-MCNC: 76 MG/DL
TSH SERPL-ACNC: 0.17 MIU/ML (ref 0.36–3.74)
UROBILINOGEN UR STRIP-ACNC: 0.2
VLDLC SERPL CALC-MCNC: 15 MG/DL
WBC # SPEC AUTO: 6.8 X10(3)/MCL (ref 4.5–11.5)
WBC #/AREA URNS HPF: ABNORMAL /HPF

## 2018-10-18 ENCOUNTER — HISTORICAL (OUTPATIENT)
Dept: LAB | Facility: HOSPITAL | Age: 83
End: 2018-10-18

## 2018-10-18 LAB
ABS NEUT (OLG): 4.96 X10(3)/MCL (ref 2.1–9.2)
ALBUMIN SERPL-MCNC: 3.9 GM/DL (ref 3.4–5)
ALBUMIN/GLOB SERPL: 1.3 RATIO (ref 1.1–2)
ALP SERPL-CCNC: 75 UNIT/L (ref 46–116)
ALT SERPL-CCNC: 15 UNIT/L (ref 12–78)
APPEARANCE, UA: CLEAR
AST SERPL-CCNC: 13 UNIT/L (ref 15–37)
BACTERIA SPEC CULT: ABNORMAL
BASOPHILS # BLD AUTO: 0 X10(3)/MCL (ref 0–0.2)
BASOPHILS NFR BLD AUTO: 0 %
BILIRUB SERPL-MCNC: 0.8 MG/DL (ref 0.2–1)
BILIRUB UR QL STRIP: NEGATIVE
BILIRUBIN DIRECT+TOT PNL SERPL-MCNC: 0.26 MG/DL (ref 0–0.2)
BILIRUBIN DIRECT+TOT PNL SERPL-MCNC: 0.54 MG/DL (ref 0–0.8)
BUN SERPL-MCNC: 50.2 MG/DL (ref 7–18)
CALCIUM SERPL-MCNC: 9.9 MG/DL (ref 8.5–10.1)
CHLORIDE SERPL-SCNC: 104 MMOL/L (ref 98–107)
CHOLEST SERPL-MCNC: 127 MG/DL (ref 0–200)
CHOLEST/HDLC SERPL: 2.5 {RATIO} (ref 0–4)
CO2 SERPL-SCNC: 26 MMOL/L (ref 21–32)
COLOR UR: YELLOW
CREAT SERPL-MCNC: 1.66 MG/DL (ref 0.6–1.3)
EOSINOPHIL # BLD AUTO: 0.1 X10(3)/MCL (ref 0–0.9)
EOSINOPHIL NFR BLD AUTO: 1 %
ERYTHROCYTE [DISTWIDTH] IN BLOOD BY AUTOMATED COUNT: 14.6 % (ref 11.5–17)
EST. AVERAGE GLUCOSE BLD GHB EST-MCNC: 126 MG/DL
GLOBULIN SER-MCNC: 3.1 GM/DL (ref 2.4–3.5)
GLUCOSE (UA): NEGATIVE
GLUCOSE SERPL-MCNC: 103 MG/DL (ref 74–106)
HBA1C MFR BLD: 6 % (ref 4.5–6.2)
HCT VFR BLD AUTO: 38.8 % (ref 37–47)
HDLC SERPL-MCNC: 51 MG/DL (ref 40–60)
HGB BLD-MCNC: 12.4 GM/DL (ref 12–16)
HGB UR QL STRIP: NEGATIVE
IMM GRANULOCYTES # BLD AUTO: 0.01 % (ref 0–0.02)
IMM GRANULOCYTES NFR BLD AUTO: 0.1 % (ref 0–0.43)
KETONES UR QL STRIP: NEGATIVE
LDLC SERPL CALC-MCNC: 63 MG/DL (ref 0–129)
LEUKOCYTE ESTERASE UR QL STRIP: ABNORMAL
LYMPHOCYTES # BLD AUTO: 1.4 X10(3)/MCL (ref 0.6–4.6)
LYMPHOCYTES NFR BLD AUTO: 20 %
MCH RBC QN AUTO: 30.4 PG (ref 27–31)
MCHC RBC AUTO-ENTMCNC: 32 GM/DL (ref 33–36)
MCV RBC AUTO: 95.1 FL (ref 80–94)
MONOCYTES # BLD AUTO: 0.6 X10(3)/MCL (ref 0.1–1.3)
MONOCYTES NFR BLD AUTO: 9 %
NEUTROPHILS # BLD AUTO: 4.96 X10(3)/MCL (ref 1.4–7.9)
NEUTROPHILS NFR BLD AUTO: 70 %
NITRITE UR QL STRIP: NEGATIVE
PH UR STRIP: 6 [PH] (ref 5–9)
PLATELET # BLD AUTO: 276 X10(3)/MCL (ref 130–400)
PMV BLD AUTO: 10 FL (ref 9.4–12.4)
POTASSIUM SERPL-SCNC: 4.8 MMOL/L (ref 3.5–5.1)
PROT SERPL-MCNC: 7 GM/DL (ref 6.4–8.2)
PROT UR QL STRIP: NEGATIVE
RBC # BLD AUTO: 4.08 X10(6)/MCL (ref 4.2–5.4)
RBC #/AREA URNS HPF: ABNORMAL /[HPF]
SODIUM SERPL-SCNC: 140 MMOL/L (ref 136–145)
SP GR UR STRIP: 1.01 (ref 1–1.03)
SQUAMOUS EPITHELIAL, UA: ABNORMAL
T4 FREE SERPL-MCNC: 1.27 NG/DL (ref 0.76–1.46)
TRIGL SERPL-MCNC: 64 MG/DL
TSH SERPL-ACNC: 0.04 MIU/ML (ref 0.36–3.74)
UROBILINOGEN UR STRIP-ACNC: 0.2
VLDLC SERPL CALC-MCNC: 13 MG/DL
WBC # SPEC AUTO: 7.1 X10(3)/MCL (ref 4.5–11.5)
WBC #/AREA URNS HPF: ABNORMAL /HPF

## 2018-11-27 ENCOUNTER — HISTORICAL (OUTPATIENT)
Dept: LAB | Facility: HOSPITAL | Age: 83
End: 2018-11-27

## 2018-11-27 LAB
ABS NEUT (OLG): 5.88 X10(3)/MCL (ref 2.1–9.2)
ALBUMIN SERPL-MCNC: 3.8 GM/DL (ref 3.4–5)
ALBUMIN/GLOB SERPL: 1.1 RATIO (ref 1.1–2)
ALP SERPL-CCNC: 92 UNIT/L (ref 46–116)
ALT SERPL-CCNC: 17 UNIT/L (ref 12–78)
AST SERPL-CCNC: 17 UNIT/L (ref 15–37)
BASOPHILS # BLD AUTO: 0 X10(3)/MCL (ref 0–0.2)
BASOPHILS NFR BLD AUTO: 0 %
BILIRUB SERPL-MCNC: 0.7 MG/DL (ref 0.2–1)
BILIRUBIN DIRECT+TOT PNL SERPL-MCNC: 0.25 MG/DL (ref 0–0.2)
BILIRUBIN DIRECT+TOT PNL SERPL-MCNC: 0.45 MG/DL (ref 0–0.8)
BUN SERPL-MCNC: 51 MG/DL (ref 7–18)
CALCIUM SERPL-MCNC: 10.4 MG/DL (ref 8.5–10.1)
CHLORIDE SERPL-SCNC: 107 MMOL/L (ref 98–107)
CO2 SERPL-SCNC: 24.4 MMOL/L (ref 21–32)
CREAT SERPL-MCNC: 1.47 MG/DL (ref 0.6–1.3)
EOSINOPHIL # BLD AUTO: 0.1 X10(3)/MCL (ref 0–0.9)
EOSINOPHIL NFR BLD AUTO: 1 %
ERYTHROCYTE [DISTWIDTH] IN BLOOD BY AUTOMATED COUNT: 14.6 % (ref 11.5–17)
GLOBULIN SER-MCNC: 3.4 GM/DL (ref 2.4–3.5)
GLUCOSE SERPL-MCNC: 116 MG/DL (ref 74–106)
HCT VFR BLD AUTO: 41.5 % (ref 37–47)
HGB BLD-MCNC: 13.6 GM/DL (ref 12–16)
IMM GRANULOCYTES # BLD AUTO: 0.02 % (ref 0–0.02)
IMM GRANULOCYTES NFR BLD AUTO: 0.2 % (ref 0–0.43)
LYMPHOCYTES # BLD AUTO: 2.4 X10(3)/MCL (ref 0.6–4.6)
LYMPHOCYTES NFR BLD AUTO: 26 %
MCH RBC QN AUTO: 30.4 PG (ref 27–31)
MCHC RBC AUTO-ENTMCNC: 32.8 GM/DL (ref 33–36)
MCV RBC AUTO: 92.6 FL (ref 80–94)
MONOCYTES # BLD AUTO: 0.8 X10(3)/MCL (ref 0.1–1.3)
MONOCYTES NFR BLD AUTO: 8 %
NEUTROPHILS # BLD AUTO: 5.88 X10(3)/MCL (ref 1.4–7.9)
NEUTROPHILS NFR BLD AUTO: 64 %
PLATELET # BLD AUTO: 273 X10(3)/MCL (ref 130–400)
PMV BLD AUTO: 10.8 FL (ref 9.4–12.4)
POTASSIUM SERPL-SCNC: 4.6 MMOL/L (ref 3.5–5.1)
PROT SERPL-MCNC: 7.2 GM/DL (ref 6.4–8.2)
RBC # BLD AUTO: 4.48 X10(6)/MCL (ref 4.2–5.4)
SODIUM SERPL-SCNC: 144 MMOL/L (ref 136–145)
T4 FREE SERPL-MCNC: 1.43 NG/DL (ref 0.76–1.46)
TSH SERPL-ACNC: 0.03 MIU/ML (ref 0.36–3.74)
WBC # SPEC AUTO: 9.2 X10(3)/MCL (ref 4.5–11.5)

## 2019-02-08 ENCOUNTER — HISTORICAL (OUTPATIENT)
Dept: LAB | Facility: HOSPITAL | Age: 84
End: 2019-02-08

## 2019-02-08 LAB
BUN SERPL-MCNC: 48.5 MG/DL (ref 7–18)
CALCIUM SERPL-MCNC: 9.9 MG/DL (ref 8.5–10.1)
CHLORIDE SERPL-SCNC: 105 MMOL/L (ref 98–107)
CO2 SERPL-SCNC: 26.2 MMOL/L (ref 21–32)
CREAT SERPL-MCNC: 1.6 MG/DL (ref 0.6–1.3)
CREAT/UREA NIT SERPL: 30
ERYTHROCYTE [DISTWIDTH] IN BLOOD BY AUTOMATED COUNT: 16.2 % (ref 11.5–17)
GLUCOSE SERPL-MCNC: 93 MG/DL (ref 74–106)
HCT VFR BLD AUTO: 39 % (ref 37–47)
HGB BLD-MCNC: 12.4 GM/DL (ref 12–16)
MCH RBC QN AUTO: 29.4 PG (ref 27–31)
MCHC RBC AUTO-ENTMCNC: 31.8 GM/DL (ref 33–36)
MCV RBC AUTO: 92.4 FL (ref 80–94)
PLATELET # BLD AUTO: 257 X10(3)/MCL (ref 130–400)
PMV BLD AUTO: 10.3 FL (ref 9.4–12.4)
POTASSIUM SERPL-SCNC: 4.8 MMOL/L (ref 3.5–5.1)
RBC # BLD AUTO: 4.22 X10(6)/MCL (ref 4.2–5.4)
SODIUM SERPL-SCNC: 142 MMOL/L (ref 136–145)
WBC # SPEC AUTO: 7.9 X10(3)/MCL (ref 4.5–11.5)

## 2019-02-13 ENCOUNTER — HISTORICAL (OUTPATIENT)
Dept: RADIOLOGY | Facility: HOSPITAL | Age: 84
End: 2019-02-13

## 2019-06-27 ENCOUNTER — HISTORICAL (OUTPATIENT)
Dept: LAB | Facility: HOSPITAL | Age: 84
End: 2019-06-27

## 2019-06-27 LAB
ABS NEUT (OLG): 7.22 X10(3)/MCL (ref 2.1–9.2)
ALBUMIN SERPL-MCNC: 3.6 GM/DL (ref 3.4–5)
ALBUMIN/GLOB SERPL: 1.1 RATIO (ref 1.1–2)
ALP SERPL-CCNC: 89 UNIT/L (ref 46–116)
ALT SERPL-CCNC: 15 UNIT/L (ref 12–78)
AST SERPL-CCNC: 13 UNIT/L (ref 15–37)
BASOPHILS # BLD AUTO: 0 X10(3)/MCL (ref 0–0.2)
BASOPHILS NFR BLD AUTO: 0 %
BILIRUB SERPL-MCNC: 0.9 MG/DL (ref 0.2–1)
BILIRUBIN DIRECT+TOT PNL SERPL-MCNC: 0.27 MG/DL (ref 0–0.2)
BILIRUBIN DIRECT+TOT PNL SERPL-MCNC: 0.63 MG/DL (ref 0–0.8)
BUN SERPL-MCNC: 41.1 MG/DL (ref 7–18)
CALCIUM SERPL-MCNC: 10.2 MG/DL (ref 8.5–10.1)
CHLORIDE SERPL-SCNC: 101 MMOL/L (ref 98–107)
CHOLEST SERPL-MCNC: 112 MG/DL (ref 0–200)
CHOLEST/HDLC SERPL: 3 {RATIO} (ref 0–4)
CO2 SERPL-SCNC: 27.6 MMOL/L (ref 21–32)
CREAT SERPL-MCNC: 1.49 MG/DL (ref 0.6–1.3)
EOSINOPHIL # BLD AUTO: 0.2 X10(3)/MCL (ref 0–0.9)
EOSINOPHIL NFR BLD AUTO: 2 %
ERYTHROCYTE [DISTWIDTH] IN BLOOD BY AUTOMATED COUNT: 15.9 % (ref 11.5–17)
GLOBULIN SER-MCNC: 3.2 GM/DL (ref 2.4–3.5)
GLUCOSE SERPL-MCNC: 113 MG/DL (ref 74–106)
HCT VFR BLD AUTO: 37.9 % (ref 37–47)
HDLC SERPL-MCNC: 37 MG/DL (ref 40–60)
HGB BLD-MCNC: 12.4 GM/DL (ref 12–16)
IMM GRANULOCYTES # BLD AUTO: 0.04 % (ref 0–0.02)
IMM GRANULOCYTES NFR BLD AUTO: 0.4 % (ref 0–0.43)
LDLC SERPL CALC-MCNC: 59 MG/DL (ref 0–129)
LYMPHOCYTES # BLD AUTO: 2.3 X10(3)/MCL (ref 0.6–4.6)
LYMPHOCYTES NFR BLD AUTO: 21 %
MCH RBC QN AUTO: 28.2 PG (ref 27–31)
MCHC RBC AUTO-ENTMCNC: 32.7 GM/DL (ref 33–36)
MCV RBC AUTO: 86.1 FL (ref 80–94)
MONOCYTES # BLD AUTO: 1 X10(3)/MCL (ref 0.1–1.3)
MONOCYTES NFR BLD AUTO: 9 %
NEUTROPHILS # BLD AUTO: 7.22 X10(3)/MCL (ref 1.4–7.9)
NEUTROPHILS NFR BLD AUTO: 67 %
PLATELET # BLD AUTO: 296 X10(3)/MCL (ref 130–400)
PMV BLD AUTO: 10.1 FL (ref 9.4–12.4)
POTASSIUM SERPL-SCNC: 4.9 MMOL/L (ref 3.5–5.1)
PROT SERPL-MCNC: 6.8 GM/DL (ref 6.4–8.2)
RBC # BLD AUTO: 4.4 X10(6)/MCL (ref 4.2–5.4)
SODIUM SERPL-SCNC: 138 MMOL/L (ref 136–145)
T4 FREE SERPL-MCNC: 1.71 NG/DL (ref 0.76–1.46)
TRIGL SERPL-MCNC: 80 MG/DL
TSH SERPL-ACNC: 0.02 MIU/ML (ref 0.36–3.74)
VLDLC SERPL CALC-MCNC: 16 MG/DL
WBC # SPEC AUTO: 10.7 X10(3)/MCL (ref 4.5–11.5)

## 2019-07-24 ENCOUNTER — HISTORICAL (OUTPATIENT)
Dept: LAB | Facility: HOSPITAL | Age: 84
End: 2019-07-24

## 2019-07-24 LAB
T4 FREE SERPL-MCNC: 1.61 NG/DL (ref 0.76–1.46)
TSH SERPL-ACNC: <0.005 MIU/ML (ref 0.36–3.74)

## 2019-09-17 ENCOUNTER — HISTORICAL (OUTPATIENT)
Dept: LAB | Facility: HOSPITAL | Age: 84
End: 2019-09-17

## 2019-09-17 LAB
ABS NEUT (OLG): 4.5 X10(3)/MCL (ref 2.1–9.2)
ALBUMIN SERPL-MCNC: 3.9 GM/DL (ref 3.4–5)
ALBUMIN/GLOB SERPL: 1.3 RATIO (ref 1.1–2)
ALP SERPL-CCNC: 80 UNIT/L (ref 46–116)
ALT SERPL-CCNC: 14 UNIT/L (ref 12–78)
APPEARANCE, UA: CLEAR
AST SERPL-CCNC: 17 UNIT/L (ref 15–37)
BACTERIA SPEC CULT: ABNORMAL
BASOPHILS # BLD AUTO: 0 X10(3)/MCL (ref 0–0.2)
BASOPHILS NFR BLD AUTO: 0 %
BILIRUB SERPL-MCNC: 1.2 MG/DL (ref 0.2–1)
BILIRUB UR QL STRIP: NEGATIVE
BILIRUBIN DIRECT+TOT PNL SERPL-MCNC: 0.34 MG/DL (ref 0–0.2)
BILIRUBIN DIRECT+TOT PNL SERPL-MCNC: 0.86 MG/DL (ref 0–0.8)
BUN SERPL-MCNC: 47.8 MG/DL (ref 7–18)
CALCIUM SERPL-MCNC: 10.2 MG/DL (ref 8.5–10.1)
CHLORIDE SERPL-SCNC: 106 MMOL/L (ref 98–107)
CHOLEST SERPL-MCNC: 106 MG/DL (ref 0–200)
CHOLEST/HDLC SERPL: 2.6 {RATIO} (ref 0–4)
CO2 SERPL-SCNC: 24.9 MMOL/L (ref 21–32)
COLOR UR: YELLOW
CREAT SERPL-MCNC: 1.42 MG/DL (ref 0.6–1.3)
EOSINOPHIL # BLD AUTO: 0.1 X10(3)/MCL (ref 0–0.9)
EOSINOPHIL NFR BLD AUTO: 1 %
ERYTHROCYTE [DISTWIDTH] IN BLOOD BY AUTOMATED COUNT: 16.8 % (ref 11.5–17)
GLOBULIN SER-MCNC: 3 GM/DL (ref 2.4–3.5)
GLUCOSE (UA): NEGATIVE
GLUCOSE SERPL-MCNC: 109 MG/DL (ref 74–106)
HCT VFR BLD AUTO: 36.6 % (ref 37–47)
HDLC SERPL-MCNC: 41 MG/DL (ref 40–60)
HGB BLD-MCNC: 11.2 GM/DL (ref 12–16)
HGB UR QL STRIP: ABNORMAL
KETONES UR QL STRIP: NEGATIVE
LDLC SERPL CALC-MCNC: 48 MG/DL (ref 0–129)
LEUKOCYTE ESTERASE UR QL STRIP: ABNORMAL
LYMPHOCYTES # BLD AUTO: 1.6 X10(3)/MCL (ref 0.6–4.6)
LYMPHOCYTES NFR BLD AUTO: 22 %
MCH RBC QN AUTO: 25.9 PG (ref 27–31)
MCHC RBC AUTO-ENTMCNC: 30.6 GM/DL (ref 33–36)
MCV RBC AUTO: 84.7 FL (ref 80–94)
MONOCYTES # BLD AUTO: 0.8 X10(3)/MCL (ref 0.1–1.3)
MONOCYTES NFR BLD AUTO: 11 %
NEUTROPHILS # BLD AUTO: 4.5 X10(3)/MCL (ref 1.4–7.9)
NEUTROPHILS NFR BLD AUTO: 63 %
NITRITE UR QL STRIP: POSITIVE
PH UR STRIP: 5.5 [PH] (ref 5–9)
PLATELET # BLD AUTO: 247 X10(3)/MCL (ref 130–400)
PMV BLD AUTO: 9.3 FL (ref 9.4–12.4)
POTASSIUM SERPL-SCNC: 4.9 MMOL/L (ref 3.5–5.1)
PROT SERPL-MCNC: 6.9 GM/DL (ref 6.4–8.2)
PROT UR QL STRIP: NEGATIVE
RBC # BLD AUTO: 4.3 X10(6)/MCL (ref 4.2–5.4)
RBC #/AREA URNS HPF: ABNORMAL /[HPF]
SODIUM SERPL-SCNC: 143 MMOL/L (ref 136–145)
SP GR UR STRIP: 1.01 (ref 1–1.03)
SQUAMOUS EPITHELIAL, UA: ABNORMAL
T4 FREE SERPL-MCNC: 1.8 NG/DL (ref 0.76–1.46)
TRIGL SERPL-MCNC: 87 MG/DL
TSH SERPL-ACNC: 0.01 MIU/ML (ref 0.36–3.74)
UROBILINOGEN UR STRIP-ACNC: 0.2
VLDLC SERPL CALC-MCNC: 17 MG/DL
WBC # SPEC AUTO: 7.2 X10(3)/MCL (ref 4.5–11.5)
WBC #/AREA URNS HPF: ABNORMAL /HPF

## 2020-01-16 ENCOUNTER — HISTORICAL (OUTPATIENT)
Dept: LAB | Facility: HOSPITAL | Age: 85
End: 2020-01-16

## 2020-01-16 LAB
ABS NEUT (OLG): 4.4 X10(3)/MCL (ref 2.1–9.2)
ALBUMIN SERPL-MCNC: 3.6 GM/DL (ref 3.4–5)
ALBUMIN/GLOB SERPL: 1.2 RATIO (ref 1.1–2)
ALP SERPL-CCNC: 84 UNIT/L (ref 38–126)
ALT SERPL-CCNC: 15 UNIT/L (ref 12–78)
AST SERPL-CCNC: 17 UNIT/L (ref 15–37)
BASOPHILS # BLD AUTO: 0 X10(3)/MCL (ref 0–0.2)
BASOPHILS NFR BLD AUTO: 0 %
BILIRUB SERPL-MCNC: 1.3 MG/DL (ref 0.2–1)
BILIRUBIN DIRECT+TOT PNL SERPL-MCNC: 0.6 MG/DL (ref 0–0.5)
BILIRUBIN DIRECT+TOT PNL SERPL-MCNC: 0.7 MG/DL (ref 0–0.8)
BUN SERPL-MCNC: 33 MG/DL (ref 7–18)
CALCIUM SERPL-MCNC: 9.6 MG/DL (ref 8.5–10.1)
CHLORIDE SERPL-SCNC: 106 MMOL/L (ref 98–107)
CHOLEST SERPL-MCNC: 88 MG/DL (ref 0–200)
CHOLEST/HDLC SERPL: 2 {RATIO} (ref 0–4)
CO2 SERPL-SCNC: 26 MMOL/L (ref 21–32)
CREAT SERPL-MCNC: 1.21 MG/DL (ref 0.55–1.02)
EOSINOPHIL # BLD AUTO: 0.2 X10(3)/MCL (ref 0–0.9)
EOSINOPHIL NFR BLD AUTO: 2 %
ERYTHROCYTE [DISTWIDTH] IN BLOOD BY AUTOMATED COUNT: 29.3 % (ref 11.5–17)
FERRITIN SERPL-MCNC: 239.5 NG/ML (ref 8–388)
GLOBULIN SER-MCNC: 3 GM/DL (ref 2.4–3.5)
GLUCOSE SERPL-MCNC: 97 MG/DL (ref 74–106)
HCT VFR BLD AUTO: 38.6 % (ref 37–47)
HDLC SERPL-MCNC: 45 MG/DL (ref 35–60)
HGB BLD-MCNC: 11.1 GM/DL (ref 12–16)
IMM GRANULOCYTES # BLD AUTO: 0 10*3/UL
IMM GRANULOCYTES NFR BLD AUTO: 1 %
IRON SATN MFR SERPL: 19.9 % (ref 20–50)
IRON SERPL-MCNC: 76 MCG/DL (ref 50–175)
LDLC SERPL CALC-MCNC: 30 MG/DL (ref 0–129)
LYMPHOCYTES # BLD AUTO: 1.6 X10(3)/MCL (ref 0.6–4.6)
LYMPHOCYTES NFR BLD AUTO: 23 %
MCH RBC QN AUTO: 23.6 PG (ref 27–31)
MCHC RBC AUTO-ENTMCNC: 28.8 GM/DL (ref 33–36)
MCV RBC AUTO: 82.1 FL (ref 80–94)
MONOCYTES # BLD AUTO: 0.7 X10(3)/MCL (ref 0.1–1.3)
MONOCYTES NFR BLD AUTO: 10 %
NEUTROPHILS # BLD AUTO: 4.4 X10(3)/MCL (ref 2.1–9.2)
NEUTROPHILS NFR BLD AUTO: 63 %
PLATELET # BLD AUTO: 182 X10(3)/MCL (ref 130–400)
PMV BLD AUTO: 10.3 FL (ref 9.4–12.4)
POTASSIUM SERPL-SCNC: 4.5 MMOL/L (ref 3.5–5.1)
PROT SERPL-MCNC: 6.6 GM/DL (ref 6.4–8.2)
RBC # BLD AUTO: 4.7 X10(6)/MCL (ref 4.2–5.4)
SODIUM SERPL-SCNC: 140 MMOL/L (ref 136–145)
T4 FREE SERPL-MCNC: 1.43 NG/DL (ref 0.76–1.46)
TIBC SERPL-MCNC: 382 MCG/DL (ref 250–450)
TRANSFERRIN SERPL-MCNC: 277 MG/DL (ref 200–360)
TRIGL SERPL-MCNC: 64 MG/DL (ref 30–150)
TSH SERPL-ACNC: 0.01 MIU/L (ref 0.36–3.74)
VLDLC SERPL CALC-MCNC: 13 MG/DL
WBC # SPEC AUTO: 7 X10(3)/MCL (ref 4.5–11.5)

## 2020-08-04 ENCOUNTER — HISTORICAL (OUTPATIENT)
Dept: RADIOLOGY | Facility: HOSPITAL | Age: 85
End: 2020-08-04

## 2020-08-04 ENCOUNTER — HISTORICAL (OUTPATIENT)
Dept: ADMINISTRATIVE | Facility: HOSPITAL | Age: 85
End: 2020-08-04

## 2020-08-04 LAB
ABS NEUT (OLG): 4.31 X10(3)/MCL (ref 2.1–9.2)
ALBUMIN SERPL-MCNC: 3.7 GM/DL (ref 3.4–5)
ALBUMIN/GLOB SERPL: 1 RATIO (ref 1.1–2)
ALP SERPL-CCNC: 88 UNIT/L (ref 46–116)
ALT SERPL-CCNC: 16 UNIT/L (ref 12–78)
AST SERPL-CCNC: 26 UNIT/L (ref 15–37)
BASOPHILS # BLD AUTO: 0 X10(3)/MCL (ref 0–0.2)
BASOPHILS NFR BLD AUTO: 1 %
BILIRUB SERPL-MCNC: 0.9 MG/DL (ref 0.2–1)
BILIRUBIN DIRECT+TOT PNL SERPL-MCNC: 0.14 MG/DL (ref 0–0.2)
BILIRUBIN DIRECT+TOT PNL SERPL-MCNC: 0.76 MG/DL (ref 0–0.8)
BUN SERPL-MCNC: 40 MG/DL (ref 7–18)
CALCIUM SERPL-MCNC: 10.1 MG/DL (ref 8.5–10.1)
CHLORIDE SERPL-SCNC: 104 MMOL/L (ref 98–107)
CHOLEST SERPL-MCNC: 118 MG/DL (ref 0–200)
CHOLEST/HDLC SERPL: 2.2 {RATIO} (ref 0–4)
CO2 SERPL-SCNC: 24.8 MMOL/L (ref 21–32)
CREAT SERPL-MCNC: 1.49 MG/DL (ref 0.6–1.3)
EOSINOPHIL # BLD AUTO: 0.2 X10(3)/MCL (ref 0–0.9)
EOSINOPHIL NFR BLD AUTO: 2 %
ERYTHROCYTE [DISTWIDTH] IN BLOOD BY AUTOMATED COUNT: 13.8 % (ref 11.5–17)
EST. AVERAGE GLUCOSE BLD GHB EST-MCNC: 126 MG/DL
FERRITIN SERPL-MCNC: 161 NG/ML (ref 8–388)
GLOBULIN SER-MCNC: 3.6 GM/DL (ref 2.4–3.5)
GLUCOSE SERPL-MCNC: 154 MG/DL (ref 74–106)
HBA1C MFR BLD: 6 % (ref 4.5–6.2)
HCT VFR BLD AUTO: 44.8 % (ref 37–47)
HDLC SERPL-MCNC: 54 MG/DL (ref 40–60)
HGB BLD-MCNC: 14.3 GM/DL (ref 12–16)
IMM GRANULOCYTES # BLD AUTO: 0.02 % (ref 0–0.02)
IMM GRANULOCYTES NFR BLD AUTO: 0.3 % (ref 0–0.43)
LDLC SERPL CALC-MCNC: 46 MG/DL (ref 0–129)
LYMPHOCYTES # BLD AUTO: 1.7 X10(3)/MCL (ref 0.6–4.6)
LYMPHOCYTES NFR BLD AUTO: 24 %
MCH RBC QN AUTO: 30.6 PG (ref 27–31)
MCHC RBC AUTO-ENTMCNC: 31.9 GM/DL (ref 33–36)
MCV RBC AUTO: 95.9 FL (ref 80–94)
MONOCYTES # BLD AUTO: 0.8 X10(3)/MCL (ref 0.1–1.3)
MONOCYTES NFR BLD AUTO: 12 %
NEUTROPHILS # BLD AUTO: 4.31 X10(3)/MCL (ref 1.4–7.9)
NEUTROPHILS NFR BLD AUTO: 61 %
PLATELET # BLD AUTO: 189 X10(3)/MCL (ref 130–400)
PMV BLD AUTO: 10.5 FL (ref 9.4–12.4)
POTASSIUM SERPL-SCNC: 5 MMOL/L (ref 3.5–5.1)
PROT SERPL-MCNC: 7.3 GM/DL (ref 6.4–8.2)
RBC # BLD AUTO: 4.67 X10(6)/MCL (ref 4.2–5.4)
SODIUM SERPL-SCNC: 140 MMOL/L (ref 136–145)
T4 FREE SERPL-MCNC: 1.64 NG/DL (ref 0.76–1.46)
TRIGL SERPL-MCNC: 92 MG/DL
TSH SERPL-ACNC: <0.005 MIU/ML (ref 0.36–3.74)
VIT B12 SERPL-MCNC: 1095 PG/ML (ref 193–986)
VLDLC SERPL CALC-MCNC: 18 MG/DL
WBC # SPEC AUTO: 7 X10(3)/MCL (ref 4.5–11.5)

## 2020-08-10 ENCOUNTER — HISTORICAL (OUTPATIENT)
Dept: RADIOLOGY | Facility: HOSPITAL | Age: 85
End: 2020-08-10

## 2020-08-10 LAB
BUN SERPL-MCNC: 41 MG/DL (ref 7–18)
CREAT SERPL-MCNC: 1.29 MG/DL (ref 0.6–1.3)

## 2020-08-31 ENCOUNTER — HISTORICAL (OUTPATIENT)
Dept: ADMINISTRATIVE | Facility: HOSPITAL | Age: 85
End: 2020-08-31

## 2020-09-04 ENCOUNTER — HISTORICAL (OUTPATIENT)
Dept: RADIOLOGY | Facility: HOSPITAL | Age: 85
End: 2020-09-04

## 2020-09-21 ENCOUNTER — HISTORICAL (OUTPATIENT)
Dept: ADMINISTRATIVE | Facility: HOSPITAL | Age: 85
End: 2020-09-21

## 2020-09-21 LAB
ABS NEUT (OLG): 3.76 X10(3)/MCL (ref 2.1–9.2)
ALBUMIN SERPL-MCNC: 4 GM/DL (ref 3.4–5)
ALBUMIN/GLOB SERPL: 1.7 RATIO (ref 1.1–2)
ALP SERPL-CCNC: 79 UNIT/L (ref 40–150)
ALT SERPL-CCNC: 13 UNIT/L (ref 0–55)
AST SERPL-CCNC: 15 UNIT/L (ref 5–34)
BASOPHILS # BLD AUTO: 0 X10(3)/MCL (ref 0–0.2)
BASOPHILS NFR BLD AUTO: 1 %
BILIRUB SERPL-MCNC: 1.4 MG/DL
BILIRUBIN DIRECT+TOT PNL SERPL-MCNC: 0.7 MG/DL (ref 0–0.5)
BILIRUBIN DIRECT+TOT PNL SERPL-MCNC: 0.7 MG/DL (ref 0–0.8)
BNP BLD-MCNC: 764 PG/ML
BUN SERPL-MCNC: 65.5 MG/DL (ref 9.8–20.1)
CALCIUM SERPL-MCNC: 9.5 MG/DL (ref 8.4–10.2)
CHLORIDE SERPL-SCNC: 107 MMOL/L (ref 98–107)
CO2 SERPL-SCNC: 20 MMOL/L (ref 23–31)
CREAT SERPL-MCNC: 1.45 MG/DL (ref 0.55–1.02)
EOSINOPHIL # BLD AUTO: 0.1 X10(3)/MCL (ref 0–0.9)
EOSINOPHIL NFR BLD AUTO: 2 %
ERYTHROCYTE [DISTWIDTH] IN BLOOD BY AUTOMATED COUNT: 15.2 % (ref 11.5–17)
GLOBULIN SER-MCNC: 2.3 GM/DL (ref 2.4–3.5)
GLUCOSE SERPL-MCNC: 102 MG/DL (ref 82–115)
HCT VFR BLD AUTO: 44.4 % (ref 37–47)
HGB BLD-MCNC: 13.9 GM/DL (ref 12–16)
LYMPHOCYTES # BLD AUTO: 1.5 X10(3)/MCL (ref 0.6–4.6)
LYMPHOCYTES NFR BLD AUTO: 25 %
MCH RBC QN AUTO: 31.1 PG (ref 27–31)
MCHC RBC AUTO-ENTMCNC: 31.3 GM/DL (ref 33–36)
MCV RBC AUTO: 99.3 FL (ref 80–94)
MONOCYTES # BLD AUTO: 0.6 X10(3)/MCL (ref 0.1–1.3)
MONOCYTES NFR BLD AUTO: 10 %
NEUTROPHILS # BLD AUTO: 3.76 X10(3)/MCL (ref 2.1–9.2)
NEUTROPHILS NFR BLD AUTO: 62 %
PLATELET # BLD AUTO: 176 X10(3)/MCL (ref 130–400)
PMV BLD AUTO: 11.1 FL (ref 9.4–12.4)
POTASSIUM SERPL-SCNC: 4.4 MMOL/L (ref 3.5–5.1)
PROT SERPL-MCNC: 6.3 GM/DL (ref 5.8–7.6)
RBC # BLD AUTO: 4.47 X10(6)/MCL (ref 4.2–5.4)
SODIUM SERPL-SCNC: 138 MMOL/L (ref 136–145)
T4 FREE SERPL-MCNC: 1.53 NG/DL (ref 0.7–1.48)
TSH SERPL-ACNC: <0.0083 UIU/ML (ref 0.35–4.94)
WBC # SPEC AUTO: 6.1 X10(3)/MCL (ref 4.5–11.5)

## 2020-10-05 ENCOUNTER — HISTORICAL (OUTPATIENT)
Dept: CARDIOLOGY | Facility: HOSPITAL | Age: 85
End: 2020-10-05

## 2020-10-05 LAB
BUN SERPL-MCNC: 50.2 MG/DL (ref 9.8–20.1)
CALCIUM SERPL-MCNC: 9.6 MG/DL (ref 8.4–10.2)
CHLORIDE SERPL-SCNC: 104 MMOL/L (ref 98–107)
CO2 SERPL-SCNC: 24 MMOL/L (ref 23–31)
CREAT SERPL-MCNC: 1.35 MG/DL (ref 0.55–1.02)
CREAT/UREA NIT SERPL: 37
GLUCOSE SERPL-MCNC: 90 MG/DL (ref 82–115)
POTASSIUM SERPL-SCNC: 4.4 MMOL/L (ref 3.5–5.1)
SODIUM SERPL-SCNC: 139 MMOL/L (ref 136–145)

## 2020-11-05 ENCOUNTER — HISTORICAL (OUTPATIENT)
Dept: ADMINISTRATIVE | Facility: HOSPITAL | Age: 85
End: 2020-11-05

## 2020-11-05 LAB
ABS NEUT (OLG): 5.33 X10(3)/MCL (ref 2.1–9.2)
ALBUMIN SERPL-MCNC: 4.01 GM/DL (ref 3.4–5)
ALBUMIN/GLOB SERPL: 1.3 RATIO (ref 1.1–2)
ALP SERPL-CCNC: 92 UNIT/L (ref 46–116)
ALT SERPL-CCNC: 17 UNIT/L (ref 12–78)
APPEARANCE, UA: CLEAR
AST SERPL-CCNC: 25 UNIT/L (ref 15–37)
BACTERIA SPEC CULT: ABNORMAL
BASOPHILS # BLD AUTO: 0 X10(3)/MCL (ref 0–0.2)
BASOPHILS NFR BLD AUTO: 0 %
BILIRUB SERPL-MCNC: 1.6 MG/DL (ref 0.2–1)
BILIRUB UR QL STRIP: NEGATIVE
BILIRUBIN DIRECT+TOT PNL SERPL-MCNC: 0.69 MG/DL (ref 0–0.2)
BILIRUBIN DIRECT+TOT PNL SERPL-MCNC: 0.91 MG/DL (ref 0–0.8)
BNP BLD-MCNC: ABNORMAL PG/ML (ref 0–125)
BUN SERPL-MCNC: 45.1 MG/DL (ref 7–18)
CALCIUM SERPL-MCNC: 10.3 MG/DL (ref 8.5–10.1)
CHLORIDE SERPL-SCNC: 104 MMOL/L (ref 98–107)
CO2 SERPL-SCNC: 24 MMOL/L (ref 21–32)
COLOR UR: YELLOW
CREAT SERPL-MCNC: 1.49 MG/DL (ref 0.6–1.3)
EOSINOPHIL # BLD AUTO: 0.1 X10(3)/MCL (ref 0–0.9)
EOSINOPHIL NFR BLD AUTO: 1 %
ERYTHROCYTE [DISTWIDTH] IN BLOOD BY AUTOMATED COUNT: 15.7 % (ref 11.5–17)
GLOBULIN SER-MCNC: 2.99 GM/DL (ref 2.4–3.5)
GLUCOSE (UA): NEGATIVE
GLUCOSE SERPL-MCNC: 120 MG/DL (ref 74–106)
HCT VFR BLD AUTO: 46.3 % (ref 37–47)
HGB BLD-MCNC: 14.2 GM/DL (ref 12–16)
HGB UR QL STRIP: NEGATIVE
IMM GRANULOCYTES # BLD AUTO: 0.01 % (ref 0–0.02)
IMM GRANULOCYTES NFR BLD AUTO: 0.1 % (ref 0–0.43)
KETONES UR QL STRIP: NEGATIVE
LEUKOCYTE ESTERASE UR QL STRIP: ABNORMAL
LYMPHOCYTES # BLD AUTO: 1.6 X10(3)/MCL (ref 0.6–4.6)
LYMPHOCYTES NFR BLD AUTO: 21 %
MCH RBC QN AUTO: 30.8 PG (ref 27–31)
MCHC RBC AUTO-ENTMCNC: 30.7 GM/DL (ref 33–36)
MCV RBC AUTO: 100.4 FL (ref 80–94)
MONOCYTES # BLD AUTO: 0.8 X10(3)/MCL (ref 0.1–1.3)
MONOCYTES NFR BLD AUTO: 10 %
NEUTROPHILS # BLD AUTO: 5.33 X10(3)/MCL (ref 1.4–7.9)
NEUTROPHILS NFR BLD AUTO: 68 %
NITRITE UR QL STRIP: NEGATIVE
PH UR STRIP: 5.5 [PH] (ref 5–9)
PLATELET # BLD AUTO: 174 X10(3)/MCL (ref 130–400)
PMV BLD AUTO: 10.9 FL (ref 9.4–12.4)
POTASSIUM SERPL-SCNC: 4.6 MMOL/L (ref 3.5–5.1)
PROT SERPL-MCNC: 7 GM/DL (ref 6.4–8.2)
PROT UR QL STRIP: NEGATIVE
RBC # BLD AUTO: 4.61 X10(6)/MCL (ref 4.2–5.4)
RBC #/AREA URNS HPF: ABNORMAL /[HPF]
SODIUM SERPL-SCNC: 138 MMOL/L (ref 136–145)
SP GR UR STRIP: 1.01 (ref 1–1.03)
SQUAMOUS EPITHELIAL, UA: ABNORMAL
UROBILINOGEN UR STRIP-ACNC: 0.2
WBC # SPEC AUTO: 7.9 X10(3)/MCL (ref 4.5–11.5)
WBC #/AREA URNS HPF: ABNORMAL /HPF

## 2020-11-19 ENCOUNTER — HISTORICAL (OUTPATIENT)
Dept: ADMINISTRATIVE | Facility: HOSPITAL | Age: 85
End: 2020-11-19

## 2020-11-19 LAB
ALBUMIN SERPL-MCNC: 3.6 GM/DL (ref 3.4–4.8)
ALBUMIN/GLOB SERPL: 1.3 RATIO (ref 1.1–2)
ALP SERPL-CCNC: 76 UNIT/L (ref 40–150)
ALT SERPL-CCNC: 15 UNIT/L (ref 0–55)
AST SERPL-CCNC: 35 UNIT/L (ref 5–34)
BILIRUB SERPL-MCNC: 2 MG/DL
BILIRUBIN DIRECT+TOT PNL SERPL-MCNC: 0.8 MG/DL (ref 0–0.5)
BILIRUBIN DIRECT+TOT PNL SERPL-MCNC: 1.2 MG/DL (ref 0–0.8)
BNP BLD-MCNC: ABNORMAL PG/ML (ref 0–125)
BUN SERPL-MCNC: 38 MG/DL (ref 9.8–20.1)
CALCIUM SERPL-MCNC: 9.4 MG/DL (ref 8.4–10.2)
CHLORIDE SERPL-SCNC: 102 MMOL/L (ref 98–107)
CO2 SERPL-SCNC: 21 MMOL/L (ref 23–31)
CREAT SERPL-MCNC: 1.12 MG/DL (ref 0.55–1.02)
GLOBULIN SER-MCNC: 2.8 GM/DL (ref 2.4–3.5)
GLUCOSE SERPL-MCNC: 57 MG/DL (ref 82–115)
POTASSIUM SERPL-SCNC: 5.4 MMOL/L (ref 3.5–5.1)
PROT SERPL-MCNC: 6.4 GM/DL (ref 5.8–7.6)
SODIUM SERPL-SCNC: 135 MMOL/L (ref 136–145)

## 2021-11-09 ENCOUNTER — HISTORICAL (OUTPATIENT)
Dept: LAB | Facility: HOSPITAL | Age: 86
End: 2021-11-09

## 2021-11-09 LAB
ABS NEUT (OLG): 4.69 X10(3)/MCL (ref 2.1–9.2)
ALBUMIN SERPL-MCNC: 4 GM/DL (ref 3.4–4.8)
ALBUMIN/GLOB SERPL: 1.3 RATIO (ref 1.1–2)
ALP SERPL-CCNC: 102 UNIT/L (ref 40–150)
ALT SERPL-CCNC: 12 UNIT/L (ref 0–55)
AST SERPL-CCNC: 18 UNIT/L (ref 5–34)
BASOPHILS # BLD AUTO: 0 X10(3)/MCL (ref 0–0.2)
BASOPHILS NFR BLD AUTO: 0 %
BILIRUB SERPL-MCNC: 1.1 MG/DL
BILIRUBIN DIRECT+TOT PNL SERPL-MCNC: 0.5 MG/DL (ref 0–0.5)
BILIRUBIN DIRECT+TOT PNL SERPL-MCNC: 0.6 MG/DL (ref 0–0.8)
BUN SERPL-MCNC: 43.2 MG/DL (ref 9.8–20.1)
CALCIUM SERPL-MCNC: 10.7 MG/DL (ref 8.7–10.5)
CHLORIDE SERPL-SCNC: 104 MMOL/L (ref 98–107)
CHOLEST SERPL-MCNC: 134 MG/DL
CHOLEST/HDLC SERPL: 3 {RATIO} (ref 0–5)
CO2 SERPL-SCNC: 26 MMOL/L (ref 23–31)
CREAT SERPL-MCNC: 1.55 MG/DL (ref 0.55–1.02)
EOSINOPHIL # BLD AUTO: 0.1 X10(3)/MCL (ref 0–0.9)
EOSINOPHIL NFR BLD AUTO: 2 %
ERYTHROCYTE [DISTWIDTH] IN BLOOD BY AUTOMATED COUNT: 14.3 % (ref 11.5–17)
EST. AVERAGE GLUCOSE BLD GHB EST-MCNC: 111.2 MG/DL
FERRITIN SERPL-MCNC: 251.1 NG/ML (ref 4.63–204)
GLOBULIN SER-MCNC: 3 GM/DL (ref 2.4–3.5)
GLUCOSE SERPL-MCNC: 100 MG/DL (ref 82–115)
HBA1C MFR BLD: 5.5 %
HCT VFR BLD AUTO: 46.8 % (ref 37–47)
HDLC SERPL-MCNC: 47 MG/DL (ref 35–60)
HGB BLD-MCNC: 14.7 GM/DL (ref 12–16)
IMM GRANULOCYTES # BLD AUTO: 0.03 % (ref 0–0.02)
IMM GRANULOCYTES NFR BLD AUTO: 0.4 % (ref 0–0.43)
IRON SATN MFR SERPL: 27 % (ref 20–50)
IRON SERPL-MCNC: 79 UG/DL (ref 50–170)
LDLC SERPL CALC-MCNC: 66 MG/DL (ref 50–140)
LYMPHOCYTES # BLD AUTO: 1.9 X10(3)/MCL (ref 0.6–4.6)
LYMPHOCYTES NFR BLD AUTO: 26 %
MCH RBC QN AUTO: 30.2 PG (ref 27–31)
MCHC RBC AUTO-ENTMCNC: 31.4 GM/DL (ref 33–36)
MCV RBC AUTO: 96.3 FL (ref 80–94)
MONOCYTES # BLD AUTO: 0.7 X10(3)/MCL (ref 0.1–1.3)
MONOCYTES NFR BLD AUTO: 10 %
NEUTROPHILS # BLD AUTO: 4.69 X10(3)/MCL (ref 1.4–7.9)
NEUTROPHILS NFR BLD AUTO: 62 %
PLATELET # BLD AUTO: 203 X10(3)/MCL (ref 130–400)
PMV BLD AUTO: 10.5 FL (ref 9.4–12.4)
POTASSIUM SERPL-SCNC: 4.6 MMOL/L (ref 3.5–5.1)
PROT SERPL-MCNC: 7 GM/DL (ref 5.8–7.6)
RBC # BLD AUTO: 4.86 X10(6)/MCL (ref 4.2–5.4)
SODIUM SERPL-SCNC: 139 MMOL/L (ref 136–145)
T4 FREE SERPL-MCNC: 1.36 NG/DL (ref 0.7–1.48)
TIBC SERPL-MCNC: 211 UG/DL (ref 70–310)
TIBC SERPL-MCNC: 290 UG/DL (ref 250–450)
TRANSFERRIN SERPL-MCNC: 254 MG/DL
TRIGL SERPL-MCNC: 106 MG/DL (ref 37–140)
TSH SERPL-ACNC: <0.0083 UIU/ML (ref 0.35–4.94)
VIT B12 SERPL-MCNC: 891 PG/ML (ref 213–816)
VLDLC SERPL CALC-MCNC: 21 MG/DL
WBC # SPEC AUTO: 7.5 X10(3)/MCL (ref 4.5–11.5)

## 2022-02-19 ENCOUNTER — HOSPITAL ENCOUNTER (OUTPATIENT)
Dept: MEDSURG UNIT | Facility: HOSPITAL | Age: 87
End: 2022-02-21
Attending: INTERNAL MEDICINE | Admitting: INTERNAL MEDICINE

## 2022-02-19 ENCOUNTER — HISTORICAL (OUTPATIENT)
Dept: ADMINISTRATIVE | Facility: HOSPITAL | Age: 87
End: 2022-02-19

## 2022-02-20 LAB
ABS NEUT (OLG): 8.04 (ref 2.1–9.2)
BASOPHILS # BLD AUTO: 0 10*3/UL (ref 0–0.2)
BASOPHILS NFR BLD AUTO: 0 %
BUN SERPL-MCNC: 86.6 MG/DL (ref 9.8–20.1)
CALCIUM SERPL-MCNC: 10.1 MG/DL (ref 8.7–10.5)
CBG: 119 (ref 70–115)
CBG: 122 (ref 70–115)
CHLORIDE SERPL-SCNC: 109 MMOL/L (ref 98–107)
CO2 SERPL-SCNC: 17 MMOL/L (ref 23–31)
CREAT SERPL-MCNC: 1.21 MG/DL (ref 0.55–1.02)
CREAT/UREA NIT SERPL: 72
EOSINOPHIL # BLD AUTO: 0 10*3/UL (ref 0–0.9)
EOSINOPHIL NFR BLD AUTO: 0 %
ERYTHROCYTE [DISTWIDTH] IN BLOOD BY AUTOMATED COUNT: 16.1 % (ref 11.5–17)
GLUCOSE SERPL-MCNC: 106 MG/DL (ref 82–115)
HCT VFR BLD AUTO: 41.6 % (ref 37–47)
HEMOLYSIS INTERF INDEX SERPL-ACNC: 16
HGB BLD-MCNC: 13.4 G/DL (ref 12–16)
ICTERIC INTERF INDEX SERPL-ACNC: 2
IMM GRANULOCYTES # BLD AUTO: 0.04 10*3/UL (ref 0–0.02)
IMM GRANULOCYTES NFR BLD AUTO: 0.3 % (ref 0–0.43)
LIPEMIC INTERF INDEX SERPL-ACNC: <0
LYMPHOCYTES # BLD AUTO: 1.8 10*3/UL (ref 0.6–4.6)
LYMPHOCYTES NFR BLD AUTO: 16 %
MANUAL DIFF? (OHS): NO
MCH RBC QN AUTO: 30.7 PG (ref 27–31)
MCHC RBC AUTO-ENTMCNC: 32.2 G/DL (ref 33–36)
MCV RBC AUTO: 95.4 FL (ref 80–94)
MONOCYTES # BLD AUTO: 1.8 10*3/UL (ref 0.1–1.3)
MONOCYTES NFR BLD AUTO: 15 %
NEUTROPHILS # BLD AUTO: 8.04 10*3/UL (ref 1.4–7.9)
NEUTROPHILS NFR BLD AUTO: 69 %
PLATELET # BLD AUTO: 169 10*3/UL (ref 130–400)
PMV BLD AUTO: 12.1 FL (ref 9.4–12.4)
POTASSIUM SERPL-SCNC: 3.7 MMOL/L (ref 3.5–5.1)
RBC # BLD AUTO: 4.36 10*6/UL (ref 4.2–5.4)
SODIUM SERPL-SCNC: 138 MMOL/L (ref 136–145)
T4 FREE SERPL-MCNC: 1.55 NG/DL (ref 0.7–1.48)
WBC # SPEC AUTO: 11.7 10*3/UL (ref 4.5–11.5)

## 2022-02-21 LAB
BNP BLD-MCNC: 1464.6 PG/ML
BUN SERPL-MCNC: 59.1 MG/DL (ref 9.8–20.1)
CALCIUM SERPL-MCNC: 9.9 MG/DL (ref 8.7–10.5)
CHLORIDE SERPL-SCNC: 109 MMOL/L (ref 98–107)
CO2 SERPL-SCNC: 19 MMOL/L (ref 23–31)
CREAT SERPL-MCNC: 1.04 MG/DL (ref 0.55–1.02)
CREAT/UREA NIT SERPL: 57
EST. AVERAGE GLUCOSE BLD GHB EST-MCNC: 114 MG/DL
GLUCOSE SERPL-MCNC: 95 MG/DL (ref 82–115)
HBA1C MFR BLD: 5.6 %
HEMOLYSIS INTERF INDEX SERPL-ACNC: 68
ICTERIC INTERF INDEX SERPL-ACNC: 2
LIPEMIC INTERF INDEX SERPL-ACNC: 0
POTASSIUM SERPL-SCNC: 4.4 MMOL/L (ref 3.5–5.1)
SODIUM SERPL-SCNC: 138 MMOL/L (ref 136–145)

## 2022-02-22 ENCOUNTER — HISTORICAL (OUTPATIENT)
Dept: ADMINISTRATIVE | Facility: HOSPITAL | Age: 87
End: 2022-02-22

## 2022-02-22 LAB
BNP BLD-MCNC: 1314.4 PG/ML
BUN SERPL-MCNC: 53.8 MG/DL (ref 9.8–20.1)
CALCIUM SERPL-MCNC: 9.6 MG/DL (ref 8.7–10.5)
CHLORIDE SERPL-SCNC: 106 MMOL/L (ref 98–107)
CO2 SERPL-SCNC: 20 MMOL/L (ref 23–31)
CREAT SERPL-MCNC: 1.04 MG/DL (ref 0.55–1.02)
CREAT/UREA NIT SERPL: 52
GLUCOSE SERPL-MCNC: 87 MG/DL (ref 82–115)
HEMOLYSIS INTERF INDEX SERPL-ACNC: 3
ICTERIC INTERF INDEX SERPL-ACNC: 2
LIPEMIC INTERF INDEX SERPL-ACNC: <0
POTASSIUM SERPL-SCNC: 3.9 MMOL/L (ref 3.5–5.1)
SODIUM SERPL-SCNC: 134 MMOL/L (ref 136–145)

## 2022-02-26 ENCOUNTER — HISTORICAL (OUTPATIENT)
Dept: ADMINISTRATIVE | Facility: HOSPITAL | Age: 87
End: 2022-02-26

## 2022-02-26 LAB
BNP BLD-MCNC: 1184.5 PG/ML
BUN SERPL-MCNC: 31.8 MG/DL (ref 9.8–20.1)
CALCIUM SERPL-MCNC: 9.4 MG/DL (ref 8.7–10.5)
CHLORIDE SERPL-SCNC: 111 MMOL/L (ref 98–107)
CO2 SERPL-SCNC: 19 MMOL/L (ref 23–31)
CREAT SERPL-MCNC: 0.74 MG/DL (ref 0.55–1.02)
CREAT/UREA NIT SERPL: 43
GLUCOSE SERPL-MCNC: 81 MG/DL (ref 82–115)
HEMOLYSIS INTERF INDEX SERPL-ACNC: 7
ICTERIC INTERF INDEX SERPL-ACNC: 1
LIPEMIC INTERF INDEX SERPL-ACNC: <0
POTASSIUM SERPL-SCNC: 3.4 MMOL/L (ref 3.5–5.1)
SODIUM SERPL-SCNC: 137 MMOL/L (ref 136–145)

## 2022-03-02 LAB
BNP BLD-MCNC: 869.1 PG/ML
BUN SERPL-MCNC: 17.2 MG/DL (ref 9.8–20.1)
CALCIUM SERPL-MCNC: 9.2 MG/DL (ref 8.7–10.5)
CHLORIDE SERPL-SCNC: 107 MMOL/L (ref 98–107)
CO2 SERPL-SCNC: 24 MMOL/L (ref 23–31)
CREAT SERPL-MCNC: 0.79 MG/DL (ref 0.55–1.02)
CREAT/UREA NIT SERPL: 22
GLUCOSE SERPL-MCNC: 79 MG/DL (ref 82–115)
HEMOLYSIS INTERF INDEX SERPL-ACNC: 3
ICTERIC INTERF INDEX SERPL-ACNC: 1
LIPEMIC INTERF INDEX SERPL-ACNC: <0
POTASSIUM SERPL-SCNC: 3.9 MMOL/L (ref 3.5–5.1)
SODIUM SERPL-SCNC: 139 MMOL/L (ref 136–145)

## 2022-03-04 ENCOUNTER — HISTORICAL (OUTPATIENT)
Dept: ADMINISTRATIVE | Facility: HOSPITAL | Age: 87
End: 2022-03-04

## 2022-03-04 LAB
BNP BLD-MCNC: 901.2 PG/ML
BUN SERPL-MCNC: 21 MG/DL (ref 9.8–20.1)
CALCIUM SERPL-MCNC: 9.6 MG/DL (ref 8.7–10.5)
CHLORIDE SERPL-SCNC: 106 MMOL/L (ref 98–107)
CO2 SERPL-SCNC: 25 MMOL/L (ref 23–31)
CREAT SERPL-MCNC: 0.97 MG/DL (ref 0.55–1.02)
CREAT/UREA NIT SERPL: 22
GLUCOSE SERPL-MCNC: 77 MG/DL (ref 82–115)
HEMOLYSIS INTERF INDEX SERPL-ACNC: 4
ICTERIC INTERF INDEX SERPL-ACNC: 1
LIPEMIC INTERF INDEX SERPL-ACNC: <0
POTASSIUM SERPL-SCNC: 3.9 MMOL/L (ref 3.5–5.1)
SODIUM SERPL-SCNC: 140 MMOL/L (ref 136–145)

## 2022-03-07 LAB
BNP BLD-MCNC: 1008.4 PG/ML
BUN SERPL-MCNC: 20.1 MG/DL (ref 9.8–20.1)
CALCIUM SERPL-MCNC: 9.3 MG/DL (ref 8.7–10.5)
CHLORIDE SERPL-SCNC: 105 MMOL/L (ref 98–107)
CO2 SERPL-SCNC: 24 MMOL/L (ref 23–31)
CREAT SERPL-MCNC: 0.94 MG/DL (ref 0.55–1.02)
CREAT/UREA NIT SERPL: 21
GLUCOSE SERPL-MCNC: 76 MG/DL (ref 82–115)
HEMOLYSIS INTERF INDEX SERPL-ACNC: 6
ICTERIC INTERF INDEX SERPL-ACNC: 1
LIPEMIC INTERF INDEX SERPL-ACNC: <0
POTASSIUM SERPL-SCNC: 3.6 MMOL/L (ref 3.5–5.1)
SODIUM SERPL-SCNC: 140 MMOL/L (ref 136–145)

## 2022-03-09 LAB
BNP BLD-MCNC: 809.7 PG/ML
BUN SERPL-MCNC: 29.8 MG/DL (ref 9.8–20.1)
CALCIUM SERPL-MCNC: 9.1 MG/DL (ref 8.7–10.5)
CHLORIDE SERPL-SCNC: 104 MMOL/L (ref 98–107)
CO2 SERPL-SCNC: 24 MMOL/L (ref 23–31)
CREAT SERPL-MCNC: 2.02 MG/DL (ref 0.55–1.02)
CREAT/UREA NIT SERPL: 15
GLUCOSE SERPL-MCNC: 85 MG/DL (ref 82–115)
HEMOLYSIS INTERF INDEX SERPL-ACNC: 18
ICTERIC INTERF INDEX SERPL-ACNC: 1
LIPEMIC INTERF INDEX SERPL-ACNC: <0
POTASSIUM SERPL-SCNC: 3.7 MMOL/L (ref 3.5–5.1)
SODIUM SERPL-SCNC: 139 MMOL/L (ref 136–145)

## 2022-03-10 LAB
BUN SERPL-MCNC: 32.1 MG/DL (ref 9.8–20.1)
CALCIUM SERPL-MCNC: 9.4 MG/DL (ref 8.7–10.5)
CHLORIDE SERPL-SCNC: 101 MMOL/L (ref 98–107)
CO2 SERPL-SCNC: 24 MMOL/L (ref 23–31)
CREAT SERPL-MCNC: 1.94 MG/DL (ref 0.55–1.02)
CREAT/UREA NIT SERPL: 17
GLUCOSE SERPL-MCNC: 80 MG/DL (ref 82–115)
HEMOLYSIS INTERF INDEX SERPL-ACNC: 6
ICTERIC INTERF INDEX SERPL-ACNC: 1
LIPEMIC INTERF INDEX SERPL-ACNC: <0
POTASSIUM SERPL-SCNC: 3.7 MMOL/L (ref 3.5–5.1)
SODIUM SERPL-SCNC: 137 MMOL/L (ref 136–145)

## 2022-03-11 LAB
BNP BLD-MCNC: 927.7 PG/ML
BUN SERPL-MCNC: 35.7 MG/DL (ref 9.8–20.1)
BUN SERPL-MCNC: 38.7 MG/DL (ref 9.8–20.1)
CALCIUM SERPL-MCNC: 9.1 MG/DL (ref 8.7–10.5)
CALCIUM SERPL-MCNC: 9.2 MG/DL (ref 8.7–10.5)
CHLORIDE SERPL-SCNC: 101 MMOL/L (ref 98–107)
CHLORIDE SERPL-SCNC: 106 MMOL/L (ref 98–107)
CO2 SERPL-SCNC: 15 MMOL/L (ref 23–31)
CO2 SERPL-SCNC: 20 MMOL/L (ref 23–31)
CREAT SERPL-MCNC: 2.16 MG/DL (ref 0.55–1.02)
CREAT SERPL-MCNC: 2.18 MG/DL (ref 0.55–1.02)
CREAT/UREA NIT SERPL: 16
CREAT/UREA NIT SERPL: 18
GLUCOSE SERPL-MCNC: 134 MG/DL (ref 82–115)
GLUCOSE SERPL-MCNC: 88 MG/DL (ref 82–115)
HEMOLYSIS INTERF INDEX SERPL-ACNC: 42
HEMOLYSIS INTERF INDEX SERPL-ACNC: 5
ICTERIC INTERF INDEX SERPL-ACNC: 1
ICTERIC INTERF INDEX SERPL-ACNC: 1
LIPEMIC INTERF INDEX SERPL-ACNC: 0
LIPEMIC INTERF INDEX SERPL-ACNC: 1
POTASSIUM SERPL-SCNC: 3.2 MMOL/L (ref 3.5–5.1)
POTASSIUM SERPL-SCNC: 3.4 MMOL/L (ref 3.5–5.1)
SODIUM SERPL-SCNC: 135 MMOL/L (ref 136–145)
SODIUM SERPL-SCNC: 137 MMOL/L (ref 136–145)

## 2022-03-12 LAB
BUN SERPL-MCNC: 36.4 MG/DL (ref 9.8–20.1)
CALCIUM SERPL-MCNC: 9.3 MG/DL (ref 8.7–10.5)
CHLORIDE SERPL-SCNC: 109 MMOL/L (ref 98–107)
CO2 SERPL-SCNC: 19 MMOL/L (ref 23–31)
CREAT SERPL-MCNC: 1.98 MG/DL (ref 0.55–1.02)
CREAT/UREA NIT SERPL: 18
GLUCOSE SERPL-MCNC: 83 MG/DL (ref 82–115)
HEMOLYSIS INTERF INDEX SERPL-ACNC: 5
ICTERIC INTERF INDEX SERPL-ACNC: 1
LIPEMIC INTERF INDEX SERPL-ACNC: <0
POTASSIUM SERPL-SCNC: 3.1 MMOL/L (ref 3.5–5.1)
SODIUM SERPL-SCNC: 140 MMOL/L (ref 136–145)

## 2022-03-14 LAB
BUN SERPL-MCNC: 41.5 MG/DL (ref 9.8–20.1)
CALCIUM SERPL-MCNC: 9.7 MG/DL (ref 8.7–10.5)
CHLORIDE SERPL-SCNC: 106 MMOL/L (ref 98–107)
CO2 SERPL-SCNC: 19 MMOL/L (ref 23–31)
CREAT SERPL-MCNC: 1.96 MG/DL (ref 0.55–1.02)
CREAT/UREA NIT SERPL: 21
GLUCOSE SERPL-MCNC: 99 MG/DL (ref 82–115)
HEMOLYSIS INTERF INDEX SERPL-ACNC: 3
ICTERIC INTERF INDEX SERPL-ACNC: 1
LIPEMIC INTERF INDEX SERPL-ACNC: <0
POTASSIUM SERPL-SCNC: 3.9 MMOL/L (ref 3.5–5.1)
SODIUM SERPL-SCNC: 139 MMOL/L (ref 136–145)

## 2022-03-16 LAB
T4 FREE SERPL-MCNC: 1.95 NG/DL (ref 0.7–1.48)
TSH SERPL-ACNC: 0.01 M[IU]/L (ref 0.35–4.94)

## 2022-03-28 ENCOUNTER — HISTORICAL (OUTPATIENT)
Dept: ADMINISTRATIVE | Facility: HOSPITAL | Age: 87
End: 2022-03-28

## 2022-03-28 LAB
APPEARANCE, UA: NORMAL
BACTERIA SPEC CULT: NORMAL
BILIRUB UR QL STRIP: NEGATIVE
COLOR UR: NORMAL
GLUCOSE (UA): NEGATIVE
HGB UR QL STRIP: NEGATIVE
KETONES UR QL STRIP: NEGATIVE
LEUKOCYTE ESTERASE UR QL STRIP: NORMAL
NITRITE UR QL STRIP: NEGATIVE
PH UR STRIP: 5.5 [PH] (ref 5–9)
PROT UR QL STRIP: NEGATIVE
RBC #/AREA URNS HPF: NORMAL /[HPF] (ref 0–2)
SP GR UR STRIP: 1.01 (ref 1–1.03)
SQUAMOUS EPITHELIAL, UA: NORMAL
UROBILINOGEN UR STRIP-ACNC: 0.2
WBC #/AREA URNS HPF: NORMAL /[HPF] (ref 0–2)

## 2022-04-11 ENCOUNTER — HISTORICAL (OUTPATIENT)
Dept: ADMINISTRATIVE | Facility: HOSPITAL | Age: 87
End: 2022-04-11
Payer: MEDICARE

## 2022-04-21 ENCOUNTER — HISTORICAL (OUTPATIENT)
Dept: ADMINISTRATIVE | Facility: HOSPITAL | Age: 87
End: 2022-04-21
Payer: MEDICARE

## 2022-04-21 LAB
ABS NEUT (OLG): 6.7 (ref 2.1–9.2)
ALBUMIN SERPL-MCNC: 2.3 G/DL (ref 3.4–4.8)
ALBUMIN/GLOB SERPL: 0.7 {RATIO} (ref 1.1–2)
ALP SERPL-CCNC: 82 U/L (ref 40–150)
ALT SERPL-CCNC: 11 U/L (ref 0–55)
AST SERPL-CCNC: 18 U/L (ref 5–34)
BASOPHILS # BLD AUTO: 0 10*3/UL (ref 0–0.2)
BASOPHILS NFR BLD AUTO: 0 %
BILIRUB SERPL-MCNC: 1.1 MG/DL
BILIRUBIN DIRECT+TOT PNL SERPL-MCNC: 0.4 (ref 0–0.8)
BILIRUBIN DIRECT+TOT PNL SERPL-MCNC: 0.7 (ref 0–0.5)
BNP BLD-MCNC: 1881.7 PG/ML
BUN SERPL-MCNC: 45.7 MG/DL (ref 9.8–20.1)
CALCIUM SERPL-MCNC: 9 MG/DL (ref 8.7–10.5)
CHLORIDE SERPL-SCNC: 105 MMOL/L (ref 98–107)
CO2 SERPL-SCNC: 26 MMOL/L (ref 23–31)
CREAT SERPL-MCNC: 1.5 MG/DL (ref 0.55–1.02)
EOSINOPHIL # BLD AUTO: 0.1 10*3/UL (ref 0–0.9)
EOSINOPHIL NFR BLD AUTO: 1 %
ERYTHROCYTE [DISTWIDTH] IN BLOOD BY AUTOMATED COUNT: 17.2 % (ref 11.5–17)
GLOBULIN SER-MCNC: 3.1 G/DL (ref 2.4–3.5)
GLUCOSE SERPL-MCNC: 95 MG/DL (ref 82–115)
HCT VFR BLD AUTO: 41.5 % (ref 37–47)
HEMOLYSIS INTERF INDEX SERPL-ACNC: <0
HGB BLD-MCNC: 13.5 G/DL (ref 12–16)
ICTERIC INTERF INDEX SERPL-ACNC: 1
IMM GRANULOCYTES # BLD AUTO: 0.11 10*3/UL (ref 0–0.02)
IMM GRANULOCYTES NFR BLD AUTO: 1 % (ref 0–0.43)
LIPEMIC INTERF INDEX SERPL-ACNC: <0
LYMPHOCYTES # BLD AUTO: 2.7 10*3/UL (ref 0.6–4.6)
LYMPHOCYTES NFR BLD AUTO: 26 %
MANUAL DIFF? (OHS): NO
MCH RBC QN AUTO: 30.5 PG (ref 27–31)
MCHC RBC AUTO-ENTMCNC: 32.5 G/DL (ref 33–36)
MCV RBC AUTO: 93.7 FL (ref 80–94)
MONOCYTES # BLD AUTO: 0.9 10*3/UL (ref 0.1–1.3)
MONOCYTES NFR BLD AUTO: 8 %
NEUTROPHILS # BLD AUTO: 6.7 10*3/UL (ref 1.4–7.9)
NEUTROPHILS NFR BLD AUTO: 64 %
PLATELET # BLD AUTO: 243 10*3/UL (ref 130–400)
PMV BLD AUTO: 11 FL (ref 9.4–12.4)
POTASSIUM SERPL-SCNC: 3.9 MMOL/L (ref 3.5–5.1)
PROT SERPL-MCNC: 5.4 G/DL (ref 5.8–7.6)
RBC # BLD AUTO: 4.43 10*6/UL (ref 4.2–5.4)
SODIUM SERPL-SCNC: 142 MMOL/L (ref 136–145)
T4 FREE SERPL-MCNC: 0.86 NG/DL (ref 0.7–1.48)
TSH SERPL-ACNC: 0.04 M[IU]/L (ref 0.35–4.94)
WBC # SPEC AUTO: 10.5 10*3/UL (ref 4.5–11.5)

## 2022-04-28 VITALS
SYSTOLIC BLOOD PRESSURE: 130 MMHG | DIASTOLIC BLOOD PRESSURE: 80 MMHG | HEIGHT: 62 IN | WEIGHT: 188 LBS | BODY MASS INDEX: 34.6 KG/M2 | OXYGEN SATURATION: 98 %

## 2022-05-11 DIAGNOSIS — E46 MALNUTRITION, UNSPECIFIED TYPE: Primary | ICD-10-CM

## 2022-05-11 NOTE — TELEPHONE ENCOUNTER
After hospital discharge patient was put on megace bid for appetite. Her appetite is much better and is wondering if she should continue the medication for another month or should she stop it and see how she does without it?

## 2022-05-12 RX ORDER — MEGESTROL ACETATE 40 MG/1
40 TABLET ORAL 2 TIMES DAILY
COMMUNITY
Start: 2022-04-07 | End: 2022-05-12 | Stop reason: SDUPTHER

## 2022-05-12 RX ORDER — MEGESTROL ACETATE 40 MG/1
40 TABLET ORAL DAILY
Qty: 30 TABLET | Refills: 0 | Status: SHIPPED | OUTPATIENT
Start: 2022-05-12 | End: 2022-11-15

## 2022-05-14 NOTE — DISCHARGE SUMMARY
Patient:   Baudilio Mantilla            MRN: [id number]            FIN: [id number]               Age:   89 years     Sex:  Female     :  1932   Associated Diagnoses:   None   Author:   Lenin Taylor MD      Basic Information     89-year-old female presented with confusion for about 4 days. Normally she is alert and oriented x4. In the ER BUN was 104 and creatinine of 1.7 consistent with severe dehydration. She has a history of hyperthyroidism (Tapazole stopped a while back) and she has had a 6 kg weight loss over 3 months. Work shows her TSH was less than 0.008 with elevated free T4 at 1.55 and I have started Tapazole 5 mg daily (need labs rechecked on or about 3/30). She is being admitted for IV hydration and we will have to hydrate carefully as she has CHF with a 20 to 25% EF. White count elevated at 15,000 on admission without any obvious evidence of infection and I think this is all concentration from dehydration we will keep our eye on it and keep an eye out for any infections that develop.    Today renal function continues to slowly improve with BUN down to 59 and creatinine down to 1.0.  BNP has increased to 1,465 without overt signs of CHF.  Will discontinue fluids at this time and continue monitoring renal function and BNP and see where things settle down.  Tomorrow we can decide if she needs a more IV fluid or if she needs some IV Lasix or combination of the 2.  Likely ready for discharge Wednesday.  CBGs have looked fairly good and A1c is normal so she had diabetes in the past it seems to have resolved.      Chief Complaint      Review of Systems   Positive for weakness, weight loss, occasional anxiety, hard of hearing, unsteady gait at times. 10 systems reviewed negative except as noted.      Health Status   Allergies:    Allergic Reactions (Selected)  Severity Not Documented  H/O: penicillin allergy- Rash.,    Allergies (1) Active Reaction  H/O: penicillin allergy Rash     Current  medications:  (Selected)   Inpatient Medications  Ordered  0.45% Normal Saline Infusion 1,000 mL: 1,000 mL, 1,000 mL, IV, 75 mL/hr, start date 02/20/22 11:10:00 CST, 1.83, m2  Dulcolax Laxative 5 mg ORAL enteric coated tablet: 10 mg, form: Tab-EC, Oral, Daily PRN for constipation, first dose 02/20/22 6:18:00 CST  LORAzepam: 0.5 mg, form: Injection, IV Push, q4hr PRN for agitation, first dose 02/20/22 7:22:00 CST  Mylanta Max with Simethicone  (400/400/40mg per 5mL) UD Susp: 15 mL, form: Susp, Oral, q6hr PRN for indigestion, first dose 02/20/22 7:20:00 CST  OLANZapine: 2.5 mg, form: Injection, IM, q4hr PRN for agitation, first dose 02/20/22 6:22:00 CST  Voltaren 1% topical gel: 1 moe, form: Gel, TOP, QID, first dose 02/20/22 13:18:00 CST, Left shoulder  Zofran ORAL tab: 8 mg, form: Tab-Dis, Oral, QID PRN for nausea/vomiting, first dose 02/20/22 6:18:00 CST, If no IV access  acetaminophen-HYDROcodone: 1 tab(s), form: Tab, Oral, q4hr PRN for pain, first dose 02/20/22 6:18:00 CST  acetaminophen: 650 mg, form: Tab, Oral, q4hr PRN for fever, first dose 02/20/22 6:18:00 CST, > 38°C (100.4°F)  acetaminophen: 650 mg, form: Tab, Oral, q4hr PRN for pain, mild, first dose 02/20/22 6:18:00 CST  albuterol 2.5 mg/3 mL (0.083%) inhalation solution: 2.5 mg, form: Soln-Inh, NEB, q4hr Resp PRN for wheezing, first dose 02/20/22 6:19:00 CST  apixaban: 5 mg, form: Tab, Oral, BID, first dose 02/20/22 9:00:00 CST  benzonatate: 200 mg, form: Cap, Oral, q8hr PRN for cough, first dose 02/20/22 6:18:00 CST  cloniDINE 0.1 mg oral tablet: 0.1 mg, form: Tab, Oral, QID PRN for hypertension, first dose 02/20/22 6:18:00 CST, NOW, PRN SBP > 160  diphenhydrAMINE: 25 mg, form: Cap, Oral, q4hr PRN for itching, first dose 02/20/22 6:18:00 CST  diphenhydrAMINE: 25 mg, form: Injection, IV, q4hr PRN for itching, first dose 02/20/22 6:18:00 CST, Use if itching and Pt is NPO  hydrALAZINE: 10 mg, form: Injection, IV, q3hr PRN for hypertension, first dose  02/20/22 6:18:00 CST, SBP>160, use if NPO or if PRN Clonidine does npot reduce SBP to <160  methIMAzole: 5 mg, form: Tab, Oral, Daily, first dose 02/20/22 10:00:00 CST  methIMAzole: 5 mg, form: Tab, Oral, Daily, first dose 02/21/22 9:00:00 CST  ondansetron: 4 mg, form: Injection, IV, q4hr PRN for nausea/vomiting, first dose 02/20/22 6:18:00 CST  pantoprazole: 40 mg, form: Tab-EC, Oral, Daily, first dose 02/20/22 6:00:00 CST  sertraline: 50 mg, form: Tab, Oral, Daily, first dose 02/20/22 9:00:00 CST  zolpidem: 5 mg, form: Tab, Oral, At Bedtime PRN for insomnia, first dose 02/20/22 6:18:00 CST, May repeat 30 minutes later once per evening if initial 5mg dose ineffective  Prescriptions  Prescribed  Eliquis 5 mg oral tablet: 5 mg = 1 tab(s), Oral, BID, # 60 tab(s), 11 Refill(s), Pharmacy: [facility], 157, cm, Height/Length Dosing, 11/12/21 10:22:00 CST, 85.275, kg, Weight Dosing, 11/12/21 10:22:00 CST  Slow Fe (as elemental iron) 45 mg oral tablet, extended release: 45 mg = 1 tab(s), Oral, M,W,F, # 30 tab(s), 10 Refill(s), Pharmacy: [facility] [place], 160, cm, Height/Length Dosing, 01/02/20 18:48:00 CST, 90.5, kg, Weight Dosing, 01/02/20 18:48:00 CST  bumetanide 1 mg oral tablet: See Instructions, TAKE TWO TABLETS BY MOUTH IN THE MORNING and 1 tab in the afternoon, # 90 tab(s), 5 Refill(s), Pharmacy: [facility], 160.02, cm, Height/Length Dosing, 11/11/20 12:15:00 CST, 92, kg, Weight Dosing, 11/11/20 12:15:00 CST  esomeprazole 40 mg oral delayed release capsule ([facility] Substitution): See Instructions, TAKE ONE TABLET BY MOUTH DAILY, # 30 tab(s), 12 Refill(s), Pharmacy: [facility], 160.02, cm, Height/Length Dosing, 11/11/20 12:15:00 CST, 92, kg, Weight Dosing, 11/11/20 12:15:00 CST  loratadine 10 mg oral tablet: 10 mg = 1 tab(s), Oral, Daily, # 30 tab(s), 11 Refill(s), Pharmacy: [facility], 160.02, cm, Height/Length Dosing, 11/11/20 12:15:00 CST, 92, kg, Weight  Dosing, 11/11/20 12:15:00 CST  meclizine 12.5 mg oral tablet: See Instructions, TAKE ONE TABLET BY MOUTH THREE TIMES DAILY AS NEEDED FOR DIZZINESS, # 30 tab(s), 12 Refill(s), Pharmacy: [facility], 160.02, cm, Height/Length Dosing, 11/11/20 12:15:00 CST, 92, kg, Weight Dosing, 11/11/20 12:15:00 CST  sertraline 50 mg oral tablet: See Instructions, TAKE ONE TABLET BY MOUTH DAILY, # 30 tab(s), 12 Refill(s), Pharmacy: [facility], 160.02, cm, Height/Length Dosing, 11/11/20 12:15:00 CST, 92, kg, Weight Dosing, 11/11/20 12:15:00 CST  spironolactone 25 mg oral tablet: See Instructions, TAKE ONE TABLET BY MOUTH DAILY, # 30 tab(s), 12 Refill(s), Pharmacy: [facility], 160.02, cm, Height/Length Dosing, 11/11/20 12:15:00 CST, 92, kg, Weight Dosing, 11/11/20 12:15:00 CST  traMADol 50 mg oral tablet: 50 mg = 1 tab(s), Oral, BID, PRN PRN for pain, quantity medically necessary, # 30 tab(s), 0 Refill(s), Pharmacy: [facility], 157, cm, Height/Length Dosing, 11/12/21 10:22:00 CST, 85.275, kg, Weight Dosing, 11/12/21 10:22:00 CST  Documented Medications  Documented  Lee Stool Softener 100 mg oral capsule: 100 mg = 1 cap(s), Oral, Daily, PRN PRN for constipation, 0 Refill(s)  methylPREDNISolone 4 mg oral tab: Oral  valACYclovir 1 g oral tablet: 1 gm = 1 tab(s), Oral, TID   Problem list:    All Problems  Anemia / SNOMED CT 525118330 / Confirmed  Atrial fibrillation, new onset / SNOMED CT 76224497 / Confirmed  HTN (hypertension), benign(  Confirmed  ) / SNOMED CT 66298932 / Confirmed  CHF (congestive heart failure) / SNOMED CT [id number] / Confirmed  Low vitamin D level / SNOMED CT 524590759 / Confirmed  Depression(  Confirmed  ) / SNOMED CT 96643984 / Confirmed  Dizziness / SNOMED CT 0361209920 / Confirmed  Gout / SNOMED CT 680590765 / Confirmed  Hyperthyroidism / SNOMED CT [id number] / Confirmed  Iatrogenic hypothyroidism / SNOMED  CT [id number] / Confirmed  Head trauma / SNOMED CT 566773955 / Confirmed  Long term current use of anticoagulant / SNOMED CT 0490531680 / Confirmed  Mitral regurgitation(  Confirmed  ) / SNOMED CT 80888867 / Confirmed  Needs flu shot / SNOMED CT 039635017 / Confirmed  Right shoulder pain / SNOMED CT 040859244806029 / Confirmed  Well adult exam / SNOMED CT 836337528 / Confirmed  Tremor / SNOMED CT 95008099 / Confirmed  Vitamin D deficiency / SNOMED CT 55842863 / Confirmed,    Active Problems (18)  Anemia   Atrial fibrillation, new onset   CHF (congestive heart failure)   Depression(  Confirmed  )   Dizziness   Gout   Head trauma   HTN (hypertension), benign(  Confirmed  )   Hyperthyroidism   Iatrogenic hypothyroidism   Long term current use of anticoagulant   Low vitamin D level   Mitral regurgitation(  Confirmed  )   Needs flu shot   Right shoulder pain   Tremor   Vitamin D deficiency   Well adult exam         Histories   Past Medical History:    Active  CHF (congestive heart failure) ([id number])  Resolved  Hypertension (14353905):  Resolved.  Chest pain (79256380):  Resolved.  SOBOE - Shortness of breath on exertion (1505998137):  Resolved.  Thyroid disease (250094190):  Resolved.  Valvular heart disease (7099608):  Resolved.  GERD - Gastro-esophageal reflux disease (1698059588):  Resolved.  BCC - Basal cell carcinoma of skin (547496437):  Resolved.  Anxiety (62653079):  Resolved.  COPD (13255772):  Resolved.  [place] (hard of hearing) ([id number]-8S69-[id number]):  Resolved.  Atrial fibrillation (R5P4Q0BG-[id number]):  Resolved.  Diverticulosis(  Confirmed  ) (2951210938):  Resolved.  Lymphadenitis NOS(  Confirmed  ) (84647269):  Resolved.  Acute gastritis ([id number]):  Resolved.  Acute esophagitis (3366072987):  Resolved.  Eczema (5904J2X5-Z5U5-5YQ1-5R80-Q934434Y9967):  Resolved.  Diabetes mellitus,  type II (797550484):  Resolved.  Need for pneumococcal vaccine (372846037):  Resolved.  Pain in both hip joints (393499467742193):  Resolved.  Pain in both knees (133443102989274):  Resolved.  Accidental fall (932257489):  Resolved.  Hip pain (07283079):  Resolved.  Leg pain (565204780):  Resolved.   Family History:    Primary malignant neoplasm of breast  Sister  Diabetes mellitus type 2  Mother  Primary malignant neoplasm of prostate  Father  Leukemia  Brother  Coronary artery disease  Mother  Cancer  Brother  Depression.  Father  Renal cancer.  Son     Procedure history:    Cataract extraction ([id number]) on 12/16/2013 at 81 Years.  Comments:  2/12/2015 16:36 CST - Contributor_system, [email or web address]  12/19/2013 10:28:07 - Blessing Carroll MD: left  Cataract extraction ([id number]) on 12/2/2013 at 81 Years.  Comments:  2/12/2015 16:36 CST - Contributor_system, [email or web address]  12/19/2013 10:27:51 - Blessing Carroll MD: right  Bilateral replacement of knee joints (3523135401) in 2004 at 72 Years.  Mastectomy of right breast (8839294792) in 1967 at 35 Years.  ctr.  Comments:  5/28/2012 16:24 CDT - DORITA -Robbin Trammell RN ,  bilateral  left foot surgery.   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    Exercise  05/03/2016  Times per week: 1-2 times/week    Home/Environment  02/26/2018  Lives with: Alone    Nutrition/Health  05/03/2016  Type of diet: Diabetic, Regular    Sexual  05/03/2016  Sexually active: No    Substance Use  03/05/2015 Risk Assessment: Denies Substance Abuse    05/03/2016  Use: Never    Tobacco  03/05/2015 Risk Assessment: Denies Tobacco Use    11/12/2021  Use: Never (less than 100 in l    Patient Wants Consult For Cessation Counseling No    02/19/2022  Use: Never (less than 100 in l    Patient Wants Consult For  Cessation Counseling N/A    02/21/2022  Use: Never (less than 100 in l    Patient Wants Consult For Cessation Counseling N/A    Abuse/Neglect  11/12/2021  SHX Any signs of abuse or neglect No    02/19/2022  SHX Any signs of abuse or neglect No    Feels unsafe at home: No    02/21/2022  SHX Any signs of abuse or neglect No    Feels unsafe at home: No    Safe place to go: Yes    Spiritual/Cultural  10/05/2020  Scientologist Preference [employer]      02/19/2022  Branch of  Never in   .        Physical Examination   Vital Signs   2/21/2022 12:00 CST      Peripheral Pulse Rate     81 bpm                             Oxygen Therapy            Room air                             Systolic Blood Pressure   132 mmHg                             Diastolic Blood Pressure  79 mmHg    2/21/2022 11:09 CST      Temperature Oral          38.1 DegC  HI                             Temperature Oral (calculated)             100.58 DegF                             Peripheral Pulse Rate     81 bpm                             Systolic Blood Pressure   132 mmHg                             Diastolic Blood Pressure  79 mmHg                             Mean Arterial Pressure, Cuff              97 mmHg    2/21/2022 10:00 CST      Oxygen Therapy            Room air    2/21/2022 8:00 CST       Oxygen Therapy            Room air    2/21/2022 7:19 CST       Temperature Oral          37 DegC                             Temperature Oral (calculated)             98.60 DegF                             Peripheral Pulse Rate     65 bpm                             SpO2                      95 %                             Systolic Blood Pressure   150 mmHg  HI                             Diastolic Blood Pressure  62 mmHg                             Mean Arterial Pressure, Cuff              91 mmHg    2/21/2022 3:05 CST       Peripheral Pulse Rate     71 bpm                             SpO2                      95 %                              Systolic Blood Pressure   107 mmHg                             Diastolic Blood Pressure  56 mmHg  LOW                             Mean Arterial Pressure, Cuff              73 mmHg    2/20/2022 23:32 CST      Temperature Oral          36.6 DegC                             Temperature Oral (calculated)             97.88 DegF                             Peripheral Pulse Rate     67 bpm                             SpO2                      95 %                             Systolic Blood Pressure   126 mmHg                             Diastolic Blood Pressure  87 mmHg                             Mean Arterial Pressure, Cuff              100 mmHg    2/20/2022 21:15 CST      FIO2                      21 %                             Oxygen Therapy            Room air    2/20/2022 19:00 CST      Temperature Oral          36.8 DegC                             Temperature Oral (calculated)             98.24 DegF                             Peripheral Pulse Rate     70 bpm                             Respiratory Rate          18 br/min                             SpO2                      94 %                             Systolic Blood Pressure   114 mmHg                             Diastolic Blood Pressure  64 mmHg    2/20/2022 15:27 CST      Temperature Oral          37.1 DegC                             Temperature Oral (calculated)             98.78 DegF                             Peripheral Pulse Rate     64 bpm                             SpO2                      96 %                             Systolic Blood Pressure   123 mmHg                             Diastolic Blood Pressure  58 mmHg  LOW                             Mean Arterial Pressure, Cuff              80 mmHg    2/20/2022 11:18 CST      Temperature Oral          36.5 DegC                             Temperature Oral (calculated)             97.70 DegF                             Peripheral Pulse Rate     68 bpm                             SpO2                       96 %                             Systolic Blood Pressure   125 mmHg                             Diastolic Blood Pressure  73 mmHg                             Mean Arterial Pressure, Cuff              90 mmHg    2/20/2022 7:28 CST       Temperature Oral          37.4 DegC                             Temperature Oral (calculated)             99.32 DegF                             Peripheral Pulse Rate     82 bpm                             SpO2                      98 %                             Systolic Blood Pressure   143 mmHg  HI                             Diastolic Blood Pressure  58 mmHg  LOW                             Mean Arterial Pressure, Cuff              86 mmHg    2/20/2022 3:41 CST       Temperature Oral          36.8 DegC                             Temperature Oral (calculated)             98.24 DegF                             Peripheral Pulse Rate     81 bpm                             Respiratory Rate          19 br/min                             SpO2                      97 %                             Systolic Blood Pressure   137 mmHg                             Diastolic Blood Pressure  70 mmHg                             Mean Arterial Pressure, Cuff              92 mmHg        Vital Signs (last 24 hrs)_____  Last Charted___________  Temp Oral     H 38.1DegC  (FEB 21 11:09)  Heart Rate Peripheral   81 bpm  (FEB 21 11:09)  Resp Rate         18 br/min  (FEB 20 19:00)  SBP      132 mmHg  (FEB 21 11:09)  DBP      79 mmHg  (FEB 21 11:09)  SpO2      95 %  (FEB 21 07:19)  Weight      80.8 kg  (FEB 21 06:00)     General: well-developed well-nourished in no acute distress  Eye: PERRLA, EOMI, clear conjunctiva, eyelids normal  HENT: Oropharynx without erythema/exudate, oropharynx and nasal mucosal surfaces moist  Neck: No thyromegaly or lymphadenopathy  Respiratory: clear to auscultation bilaterally  Cardiovascular: regular rate and rhythm without murmurs, gallops or  rubs  Gastrointestinal: soft, non-tender, non-distended with normal bowel sounds, without masses to palpation  Integumentary: no rashes or skin lesions present, + poor skin turgor  Neurologic: cranial nerves grossly intact, no signs of peripheral neurological deficit  Psych: Appropriate affect, not anxious or depressed      Impression and Plan     Dehydration with uremia  -Usually treated with half-normal saline at 75 cc/h and now holding fluids to monitor renal function and BNP  -Monitor, creatinine, BMP  -Watch for development of any CHF during IV fluid administration    Hyperthyroidism  -Methimazole 5 mg daily  -Recheck TSH and free T4 on or about 3/20    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 fib  CODE STATUS is full code    Due to need to monitor renal function a bit longer and possibly give additional IV fluids and/or IV Lasix she has been discharged from observation and admitted to an acute inpatient bed.    Total discharge time less than 30 minutes.

## 2022-05-14 NOTE — H&P
Patient:   Baudilio Mantilla            MRN: [id number]            FIN: [id number]               Age:   89 years     Sex:  Female     :  1932   Associated Diagnoses:   None   Author:   Lenin Taylor MD      Basic Information     89-year-old female presented with confusion for about 4 days. Normally she is alert and oriented x4. In the ER BUN was 104 and creatinine of 1.7 consistent with severe dehydration. She has a history of hyperthyroidism (Tapazole stopped a while back) and she has had a 6 kg weight loss over 3 months. Work shows her TSH was less than 0.008 with elevated free T4 at 1.55 and I have started Tapazole 5 mg daily (need labs rechecked on or about 3/30). She is being admitted for IV hydration and we will have to hydrate carefully as she has CHF with a 20 to 25% EF. White count elevated at 15,000 on admission without any obvious evidence of infection and I think this is all concentration from dehydration we will keep our eye on it and keep an eye out for any infections that develop.    Today she is requested regular diet instead of the diabetic one and claims no history of diabetes despite it being listed in prior records. I will change her back to regular diet but continue CBGs for at least a day or so. She has pain in her left shoulder from osteoarthritis and I will give a trial of Voltaren gel.      Chief Complaint   2022 18:36 CST      Brought in by family for increased in confusion. Family feels patient may be dehydrated or have a UTI        Review of Systems   Positive for weakness, weight loss, occasional anxiety, hard of hearing, unsteady gait at times. 10 systems reviewed negative except as noted.      Health Status   Allergies:    Allergic Reactions (Selected)  Severity Not Documented  H/O: penicillin allergy- Rash.,    Allergies (1) Active Reaction  H/O: penicillin allergy Rash     Current medications:  (Selected)   Inpatient Medications  Ordered  0.45% Normal Saline  Infusion 1,000 mL: 1,000 mL, 1,000 mL, IV, 75 mL/hr, start date 02/20/22 11:10:00 CST, 1.83, m2  Dextrose 50% and Water  (50 mL vial/syringe): 25 mL, 12.5 gm =, form: Injection, IV Push, As Directed PRN for blood glucose, Infuse over: 5 minute(s), first dose 02/20/22 6:16:00 CST, Unconscious patient: Repeat as ordered per protocol.  Dextrose 50% and Water  (50 mL vial/syringe): 25 mL, 12.5 gm =, form: Injection, IV Push, Once PRN for blood glucose, Infuse over: 5 minute(s), first dose 02/20/22 6:16:00 CST, Unconscious patient: Look for other source of altered mental status.  Dextrose 50% and Water  (50 mL vial/syringe): 50 mL, 25 gm =, form: Injection, IV Push, As Directed PRN for blood glucose, Infuse over: 5 minute(s), first dose 02/20/22 6:16:00 CST, Unconscious patient: Repeat as ordered per protocol.  Dextrose 50% in Water intravenous solution: 25 mL, 12.5 gm =, form: Injection, IV Push, As Directed PRN for blood glucose, Infuse over: 5 minute(s), first dose 02/20/22 6:16:00 CST, Conscious patient.  Dulcolax Laxative 5 mg ORAL enteric coated tablet: 10 mg, form: Tab-EC, Oral, Daily PRN for constipation, first dose 02/20/22 6:18:00 CST  LORAzepam: 0.5 mg, form: Injection, IV Push, q4hr PRN for agitation, first dose 02/20/22 7:22:00 CST  Mylanta Max with Simethicone  (400/400/40mg per 5mL) UD Susp: 15 mL, form: Susp, Oral, q6hr PRN for indigestion, first dose 02/20/22 7:20:00 CST  OLANZapine: 2.5 mg, form: Injection, IM, q4hr PRN for agitation, first dose 02/20/22 6:22:00 CST  Voltaren 1% topical gel: 1 moe, form: Gel, TOP, QID, first dose 02/20/22 13:18:00 CST, Left shoulder  Zofran ORAL tab: 8 mg, form: Tab-Dis, Oral, QID PRN for nausea/vomiting, first dose 02/20/22 6:18:00 CST, If no IV access  acetaminophen-HYDROcodone: 1 tab(s), form: Tab, Oral, q4hr PRN for pain, first dose 02/20/22 6:18:00 CST  acetaminophen: 650 mg, form: Tab, Oral, q4hr PRN for fever, first dose 02/20/22 6:18:00 CST, > 38°C  (100.4°F)  acetaminophen: 650 mg, form: Tab, Oral, q4hr PRN for pain, mild, first dose 02/20/22 6:18:00 CST  albuterol 2.5 mg/3 mL (0.083%) inhalation solution: 2.5 mg, form: Soln-Inh, NEB, q4hr Resp PRN for wheezing, first dose 02/20/22 6:19:00 CST  apixaban: 5 mg, form: Tab, Oral, BID, first dose 02/20/22 9:00:00 CST  benzonatate: 200 mg, form: Cap, Oral, q8hr PRN for cough, first dose 02/20/22 6:18:00 CST  cloniDINE 0.1 mg oral tablet: 0.1 mg, form: Tab, Oral, QID PRN for hypertension, first dose 02/20/22 6:18:00 CST, NOW, PRN SBP > 160  diphenhydrAMINE: 25 mg, form: Cap, Oral, q4hr PRN for itching, first dose 02/20/22 6:18:00 CST  diphenhydrAMINE: 25 mg, form: Injection, IV, q4hr PRN for itching, first dose 02/20/22 6:18:00 CST, Use if itching and Pt is NPO  glucagon: 1 mg, form: Injection, IM, q10min PRN for blood glucose, first dose 02/20/22 6:16:00 CST, Conscious Patient with NO IV access available and BG < 45 mg/dl.  glucagon: 1 mg, form: Injection, IM, q10min PRN for blood glucose, first dose 02/20/22 6:16:00 CST, Unconscious patient: Patient with NO IV access available and BG < 70 mg/dl.  hydrALAZINE: 10 mg, form: Injection, IV, q3hr PRN for hypertension, first dose 02/20/22 6:18:00 CST, SBP>160, use if NPO or if PRN Clonidine does npot reduce SBP to <160  insulin lispro: 2-14 units, form: Injection, Subcutaneous, As Directed PRN for blood glucose, first dose 02/20/22 6:16:00 CST  methIMAzole: 5 mg, form: Tab, Oral, Daily, first dose 02/20/22 10:00:00 CST  methIMAzole: 5 mg, form: Tab, Oral, Daily, first dose 02/21/22 9:00:00 CST  ondansetron: 4 mg, form: Injection, IV, q4hr PRN for nausea/vomiting, first dose 02/20/22 6:18:00 CST  pantoprazole: 40 mg, form: Tab-EC, Oral, Daily, first dose 02/20/22 6:00:00 CST  sertraline: 50 mg, form: Tab, Oral, Daily, first dose 02/20/22 9:00:00 CST  zolpidem: 5 mg, form: Tab, Oral, At Bedtime PRN for insomnia, first dose 02/20/22 6:18:00 CST, May repeat 30 minutes later  once per evening if initial 5mg dose ineffective  Prescriptions  Prescribed  Eliquis 5 mg oral tablet: 5 mg = 1 tab(s), Oral, BID, # 60 tab(s), 11 Refill(s), Pharmacy: [facility], 157, cm, Height/Length Dosing, 11/12/21 10:22:00 CST, 85.275, kg, Weight Dosing, 11/12/21 10:22:00 CST  Slow Fe (as elemental iron) 45 mg oral tablet, extended release: 45 mg = 1 tab(s), Oral, M,W,F, # 30 tab(s), 10 Refill(s), Pharmacy: [facility], 160, cm, Height/Length Dosing, 01/02/20 18:48:00 CST, 90.5, kg, Weight Dosing, 01/02/20 18:48:00 CST  bumetanide 1 mg oral tablet: See Instructions, TAKE TWO TABLETS BY MOUTH IN THE MORNING and 1 tab in the afternoon, # 90 tab(s), 5 Refill(s), Pharmacy: [facility], 160.02, cm, Height/Length Dosing, 11/11/20 12:15:00 CST, 92, kg, Weight Dosing, 11/11/20 12:15:00 CST  esomeprazole 40 mg oral delayed release capsule ([facility]): See Instructions, TAKE ONE TABLET BY MOUTH DAILY, # 30 tab(s), 12 Refill(s), Pharmacy: [facility], 160.02, cm, Height/Length Dosing, 11/11/20 12:15:00 CST, 92, kg, Weight Dosing, 11/11/20 12:15:00 CST  loratadine 10 mg oral tablet: 10 mg = 1 tab(s), Oral, Daily, # 30 tab(s), 11 Refill(s), Pharmacy: [facility], 160.02, cm, Height/Length Dosing, 11/11/20 12:15:00 CST, 92, kg, Weight Dosing, 11/11/20 12:15:00 CST  meclizine 12.5 mg oral tablet: See Instructions, TAKE ONE TABLET BY MOUTH THREE TIMES DAILY AS NEEDED FOR DIZZINESS, # 30 tab(s), 12 Refill(s), Pharmacy: [facility], 160.02, cm, Height/Length Dosing, 11/11/20 12:15:00 CST, 92, kg, Weight Dosing, 11/11/20 12:15:00 CST  sertraline 50 mg oral tablet: See Instructions, TAKE ONE TABLET BY MOUTH DAILY, # 30 tab(s), 12 Refill(s), Pharmacy: [facility], 160.02, cm, Height/Length Dosing, 11/11/20 12:15:00 CST, 92, kg, Weight Dosing, 11/11/20 12:15:00 CST  spironolactone 25 mg oral tablet: See Instructions, TAKE ONE TABLET BY  MOUTH DAILY, # 30 tab(s), 12 Refill(s), Pharmacy: [facility], 160.02, cm, Height/Length Dosing, 11/11/20 12:15:00 CST, 92, kg, Weight Dosing, 11/11/20 12:15:00 CST  traMADol 50 mg oral tablet: 50 mg = 1 tab(s), Oral, BID, PRN PRN for pain, quantity medically necessary, # 30 tab(s), 0 Refill(s), Pharmacy: [facility], 157, cm, Height/Length Dosing, 11/12/21 10:22:00 CST, 85.275, kg, Weight Dosing, 11/12/21 10:22:00 CST  Documented Medications  Documented  Lee Stool Softener 100 mg oral capsule: 100 mg = 1 cap(s), Oral, Daily, PRN PRN for constipation, 0 Refill(s)  methylPREDNISolone 4 mg oral tab: Oral  valACYclovir 1 g oral tablet: 1 gm = 1 tab(s), Oral, TID   Problem list:    All Problems  Anemia / SNOMED CT 396922674 / Confirmed  Atrial fibrillation, new onset / SNOMED CT 23670512 / Confirmed  HTN (hypertension), benign(  Confirmed  ) / SNOMED CT 60465700 / Confirmed  CHF (congestive heart failure) / SNOMED CT [id number] / Confirmed  Low vitamin D level / SNOMED CT 958654963 / Confirmed  Depression(  Confirmed  ) / SNOMED CT 59136559 / Confirmed  Dizziness / SNOMED CT 8171541043 / Confirmed  Gout / SNOMED CT 994372999 / Confirmed  Hyperthyroidism / SNOMED CT [id number] / Confirmed  Iatrogenic hypothyroidism / SNOMED CT [id number] / Confirmed  Head trauma / SNOMED CT 811587639 / Confirmed  Long term current use of anticoagulant / SNOMED CT 5387941184 / Confirmed  Mitral regurgitation(  Confirmed  ) / SNOMED CT 87557283 / Confirmed  Needs flu shot / SNOMED CT 429590524 / Confirmed  Right shoulder pain / SNOMED CT 273598356389636 / Confirmed  Well adult exam / SNOMED CT 706838922 / Confirmed  Tremor / SNOMED CT 92257025 / Confirmed  Vitamin D deficiency / SNOMED CT 26752092 / Confirmed,    Active Problems (18)  Anemia   Atrial fibrillation, new onset   CHF (congestive heart failure)   Depression(   Confirmed  )   Dizziness   Gout   Head trauma   HTN (hypertension), benign(  Confirmed  )   Hyperthyroidism   Iatrogenic hypothyroidism   Long term current use of anticoagulant   Low vitamin D level   Mitral regurgitation(  Confirmed  )   Needs flu shot   Right shoulder pain   Tremor   Vitamin D deficiency   Well adult exam         Histories   Past Medical History:    Active  CHF (congestive heart failure) ([id number])  Resolved  Hypertension (90127674):  Resolved.  Chest pain (89392783):  Resolved.  SOBOE - Shortness of breath on exertion (3006650818):  Resolved.  Thyroid disease (091738738):  Resolved.  Valvular heart disease (0167454):  Resolved.  GERD - Gastro-esophageal reflux disease (2542421908):  Resolved.  BCC - Basal cell carcinoma of skin (731082668):  Resolved.  Anxiety (12597678):  Resolved.  COPD (73115921):  Resolved.  [place] (hard of hearing) ([id number]):  Resolved.  Atrial fibrillation ([id number]):  Resolved.  Diverticulosis(  Confirmed  ) (0245137844):  Resolved.  Lymphadenitis NOS(  Confirmed  ) (52585919):  Resolved.  Acute gastritis ([id number]):  Resolved.  Acute esophagitis (8498755531):  Resolved.  Eczema (1411K4X3-[id number]):  Resolved.  Diabetes mellitus, type II (806901602):  Resolved.  Need for pneumococcal vaccine (484349325):  Resolved.  Pain in both hip joints (087256773765236):  Resolved.  Pain in both knees (308249003558921):  Resolved.  Accidental fall (413815816):  Resolved.  Hip pain (25737642):  Resolved.  Leg pain (033001632):  Resolved.   Family History:    Primary malignant neoplasm of breast  Sister  Diabetes mellitus type 2  Mother  Primary malignant neoplasm of prostate  Father  Leukemia  Brother  Coronary artery disease  Mother  Cancer  Brother  Depression.  Father  Renal cancer.  Son     Procedure history:    Cataract extraction ([id number]) on  12/16/2013 at 81 Years.  Comments:  2/12/2015 16:36 CST - Contributor_system, [email or web address]  12/19/2013 10:28:07 - Blessing Carroll MD: left  Cataract extraction ([id number]) on 12/2/2013 at 81 Years.  Comments:  2/12/2015 16:36 CST - Contributor_system, [email or web address]  12/19/2013 10:27:51 - Blessing Carroll MD: right  Bilateral replacement of knee joints (1001053722) in 2004 at 72 Years.  Mastectomy of right breast (5636753913) in 1967 at 35 Years.  ctr.  Comments:  5/28/2012 16:24 CDT - DORITA Trammell RN ,  bilateral  left foot surgery.   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    Exercise  05/03/2016  Times per week: 1-2 times/week    Home/Environment  02/26/2018  Lives with: Alone    Nutrition/Health  05/03/2016  Type of diet: Diabetic, Regular    Sexual  05/03/2016  Sexually active: No    Substance Use  03/05/2015 Risk Assessment: Denies Substance Abuse    05/03/2016  Use: Never    Tobacco  03/05/2015 Risk Assessment: Denies Tobacco Use    11/12/2021  Use: Never (less than 100 in l    Patient Wants Consult For Cessation Counseling No    02/19/2022  Use: Never (less than 100 in l    Patient Wants Consult For Cessation Counseling N/A    Abuse/Neglect  11/12/2021  SHX Any signs of abuse or neglect No    02/19/2022  SHX Any signs of abuse or neglect No    Feels unsafe at home: No    Spiritual/Cultural  10/05/2020  Sikhism Preference Mandaen      02/19/2022  Branch of  Never in   .        Physical Examination   Vital Signs   2/20/2022 11:18 CST      Temperature Oral          36.5 DegC                             Temperature Oral (calculated)             97.70 DegF                             Peripheral Pulse Rate     68 bpm                             SpO2                      96 %                              Systolic Blood Pressure   125 mmHg                             Diastolic Blood Pressure  73 mmHg                             Mean Arterial Pressure, Cuff              90 mmHg    2/20/2022 7:28 CST       Temperature Oral          37.4 DegC                             Temperature Oral (calculated)             99.32 DegF                             Peripheral Pulse Rate     82 bpm                             SpO2                      98 %                             Systolic Blood Pressure   143 mmHg  HI                             Diastolic Blood Pressure  58 mmHg  LOW                             Mean Arterial Pressure, Cuff              86 mmHg    2/20/2022 3:41 CST       Temperature Oral          36.8 DegC                             Temperature Oral (calculated)             98.24 DegF                             Peripheral Pulse Rate     81 bpm                             Respiratory Rate          19 br/min                             SpO2                      97 %                             Systolic Blood Pressure   137 mmHg                             Diastolic Blood Pressure  70 mmHg                             Mean Arterial Pressure, Cuff              92 mmHg    2/19/2022 23:39 CST      Temperature Oral          37.3 DegC                             Temperature Oral (calculated)             99.14 DegF                             Peripheral Pulse Rate     61 bpm                             Respiratory Rate          19 br/min                             SpO2                      97 %                             Systolic Blood Pressure   125 mmHg                             Diastolic Blood Pressure  56 mmHg  LOW                             Mean Arterial Pressure, Cuff              79 mmHg    2/19/2022 23:00 CST      FIO2                      21 %                             Oxygen Therapy            Room air    2/19/2022 22:18 CST      Temperature Oral          36.8 DegC                              Temperature Oral (calculated)             98.24 DegF                             Peripheral Pulse Rate     61 bpm                             SpO2                      95 %                             Systolic Blood Pressure   116 mmHg                             Diastolic Blood Pressure  52 mmHg  LOW                             Mean Arterial Pressure, Cuff              73 mmHg    2/19/2022 21:20 CST      Temperature Oral          36.7 DegC                             Temperature Oral (calculated)             98.06 DegF                             Peripheral Pulse Rate     66 bpm                             SpO2                      97 %                             Systolic Blood Pressure   130 mmHg                             Diastolic Blood Pressure  37 mmHg  LOW                             Mean Arterial Pressure, Cuff              68 mmHg    2/19/2022 21:00 CST      Peripheral Pulse Rate     62 bpm                             SpO2                      96 %    2/19/2022 19:50 CST      Peripheral Pulse Rate     70 bpm                             SpO2                      99 %    2/19/2022 19:30 CST      Peripheral Pulse Rate     61 bpm                             SpO2                      96 %                             Systolic Blood Pressure   139 mmHg                             Diastolic Blood Pressure  58 mmHg  LOW                             Mean Arterial Pressure, Cuff              85 mmHg    2/19/2022 18:36 CST      Temperature Oral          36.4 DegC                             Temperature Oral (calculated)             97.52 DegF                             Peripheral Pulse Rate     83 bpm                             Respiratory Rate          18 br/min                             SpO2                      94 %                             Oxygen Therapy            Room air                             Systolic Blood Pressure   145 mmHg  HI                             Diastolic Blood Pressure  53 mmHg  LOW         Vital Signs (last 24 hrs)_____  Last Charted___________  Temp Oral     36.5 DegC  (FEB 20 11:18)  Heart Rate Peripheral   68 bpm  (FEB 20 11:18)  Resp Rate         19 br/min  (FEB 20 03:41)  SBP      125 mmHg  (FEB 20 11:18)  DBP      73 mmHg  (FEB 20 11:18)  SpO2      96 %  (FEB 20 11:18)  Weight      79.2 kg  (FEB 19 23:13)  Height      161 cm  (FEB 19 23:13)  BMI      30.55  (FEB 19 23:13)     General: well-developed well-nourished in no acute distress  Eye: PERRLA, EOMI, clear conjunctiva, eyelids normal  HENT: Oropharynx without erythema/exudate, oropharynx and nasal mucosal surfaces moist  Neck: No thyromegaly or lymphadenopathy  Respiratory: clear to auscultation bilaterally  Cardiovascular: regular rate and rhythm without murmurs, gallops or rubs  Gastrointestinal: soft, non-tender, non-distended with normal bowel sounds, without masses to palpation  Integumentary: no rashes or skin lesions present, + poor skin turgor  Neurologic: cranial nerves grossly intact, no signs of peripheral neurological deficit  Psych: Appropriate affect, not anxious or depressed      Impression and Plan     Dehydration with uremia  -IV half-normal saline at 75 cc/h  -Monitor BUN and creatinine  -Watch for development of any CHF during IV fluid administration    Hyperthyroidism  -Methimazole 5 mg daily  -Recheck TSH and free T4 on or about 3/20    Questionable diabetes  -Regular diet with CBG monitoring and if patient develops hyperglycemia we will have to change back to a diabetic diet  -A1c level        COPD  Atrial fibrillation  GERD  Osteoarthritis -many joints  Hard of hearing  Anxiety  Valvular heart disease with porcine valve  Tendency towards falls  Gout without acute flare  -All stable    VTE prophylaxis with Eliquis that she takes for her A. fib  CODE STATUS is full code

## 2022-05-16 ENCOUNTER — TELEPHONE (OUTPATIENT)
Dept: INTERNAL MEDICINE | Facility: CLINIC | Age: 87
End: 2022-05-16

## 2022-05-16 PROCEDURE — G0179 PR HOME HEALTH MD RECERTIFICATION: ICD-10-PCS | Mod: ,,, | Performed by: INTERNAL MEDICINE

## 2022-05-16 PROCEDURE — G0179 MD RECERTIFICATION HHA PT: HCPCS | Mod: ,,, | Performed by: INTERNAL MEDICINE

## 2022-05-16 NOTE — TELEPHONE ENCOUNTER
Patient has been having blood on her pad on occasion. No burning, pressure, or dysuria. Is on eliquis.

## 2022-05-19 RX ORDER — CIPROFLOXACIN 500 MG/1
500 TABLET ORAL 2 TIMES DAILY
Qty: 8 TABLET | Refills: 0 | Status: SHIPPED | OUTPATIENT
Start: 2022-05-19 | End: 2022-05-23

## 2022-05-31 ENCOUNTER — TELEPHONE (OUTPATIENT)
Dept: INTERNAL MEDICINE | Facility: CLINIC | Age: 87
End: 2022-05-31

## 2022-05-31 DIAGNOSIS — I10 HYPERTENSION, UNSPECIFIED TYPE: ICD-10-CM

## 2022-05-31 DIAGNOSIS — N18.30 STAGE 3 CHRONIC KIDNEY DISEASE, UNSPECIFIED WHETHER STAGE 3A OR 3B CKD: ICD-10-CM

## 2022-05-31 DIAGNOSIS — D64.9 ANEMIA, UNSPECIFIED TYPE: ICD-10-CM

## 2022-05-31 DIAGNOSIS — E05.90 HYPERTHYROIDISM: ICD-10-CM

## 2022-05-31 DIAGNOSIS — N18.30 STAGE 3 CHRONIC KIDNEY DISEASE, UNSPECIFIED WHETHER STAGE 3A OR 3B CKD: Primary | ICD-10-CM

## 2022-05-31 DIAGNOSIS — I48.91 ATRIAL FIBRILLATION, UNSPECIFIED TYPE: ICD-10-CM

## 2022-05-31 DIAGNOSIS — I50.9 HEART FAILURE, UNSPECIFIED HF CHRONICITY, UNSPECIFIED HEART FAILURE TYPE: Primary | ICD-10-CM

## 2022-05-31 DIAGNOSIS — I50.9 CONGESTIVE HEART FAILURE, UNSPECIFIED HF CHRONICITY, UNSPECIFIED HEART FAILURE TYPE: ICD-10-CM

## 2022-06-06 ENCOUNTER — TELEPHONE (OUTPATIENT)
Dept: INTERNAL MEDICINE | Facility: CLINIC | Age: 87
End: 2022-06-06
Payer: MEDICARE

## 2022-07-12 ENCOUNTER — DOCUMENT SCAN (OUTPATIENT)
Dept: HOME HEALTH SERVICES | Facility: HOSPITAL | Age: 87
End: 2022-07-12
Payer: MEDICARE

## 2022-07-14 ENCOUNTER — EXTERNAL HOME HEALTH (OUTPATIENT)
Dept: HOME HEALTH SERVICES | Facility: HOSPITAL | Age: 87
End: 2022-07-14
Payer: MEDICARE

## 2022-08-02 ENCOUNTER — DOCUMENT SCAN (OUTPATIENT)
Dept: HOME HEALTH SERVICES | Facility: HOSPITAL | Age: 87
End: 2022-08-02
Payer: MEDICARE

## 2022-08-11 ENCOUNTER — TELEPHONE (OUTPATIENT)
Dept: INTERNAL MEDICINE | Facility: CLINIC | Age: 87
End: 2022-08-11

## 2022-11-15 RX ORDER — APIXABAN 5 MG/1
5 TABLET, FILM COATED ORAL 2 TIMES DAILY
COMMUNITY
Start: 2022-10-12 | End: 2022-11-17 | Stop reason: SDUPTHER

## 2022-11-15 RX ORDER — DICLOFENAC SODIUM 10 MG/G
GEL TOPICAL
COMMUNITY
Start: 2022-07-09

## 2022-11-15 RX ORDER — SPIRONOLACTONE 25 MG/1
25 TABLET ORAL DAILY
COMMUNITY
Start: 2022-10-07 | End: 2022-12-06 | Stop reason: SDUPTHER

## 2022-11-15 RX ORDER — MECLIZINE HCL 12.5 MG 12.5 MG/1
TABLET ORAL
COMMUNITY
Start: 2021-07-12 | End: 2022-11-29

## 2022-11-15 RX ORDER — METHIMAZOLE 5 MG/1
0.5 TABLET ORAL DAILY
COMMUNITY
Start: 2022-10-28 | End: 2023-05-01 | Stop reason: SDUPTHER

## 2022-11-15 RX ORDER — PANTOPRAZOLE SODIUM 40 MG/1
40 TABLET, DELAYED RELEASE ORAL 2 TIMES DAILY
COMMUNITY
Start: 2022-10-28 | End: 2023-05-01

## 2022-11-17 DIAGNOSIS — I48.91 ATRIAL FIBRILLATION, UNSPECIFIED TYPE: Primary | ICD-10-CM

## 2022-11-17 RX ORDER — APIXABAN 5 MG/1
5 TABLET, FILM COATED ORAL 2 TIMES DAILY
Qty: 60 TABLET | Refills: 11 | Status: SHIPPED | OUTPATIENT
Start: 2022-11-17 | End: 2023-12-18 | Stop reason: SDUPTHER

## 2022-12-01 DIAGNOSIS — E05.90 HYPERTHYROIDISM: ICD-10-CM

## 2022-12-01 DIAGNOSIS — D64.9 ANEMIA, UNSPECIFIED TYPE: ICD-10-CM

## 2022-12-01 DIAGNOSIS — R73.01 IFG (IMPAIRED FASTING GLUCOSE): ICD-10-CM

## 2022-12-01 DIAGNOSIS — I50.9 HEART FAILURE, UNSPECIFIED HF CHRONICITY, UNSPECIFIED HEART FAILURE TYPE: ICD-10-CM

## 2022-12-01 DIAGNOSIS — N18.30 STAGE 3 CHRONIC KIDNEY DISEASE, UNSPECIFIED WHETHER STAGE 3A OR 3B CKD: Primary | ICD-10-CM

## 2022-12-01 DIAGNOSIS — I48.91 ATRIAL FIBRILLATION, UNSPECIFIED TYPE: ICD-10-CM

## 2022-12-01 DIAGNOSIS — I10 HYPERTENSION, UNSPECIFIED TYPE: ICD-10-CM

## 2022-12-01 DIAGNOSIS — I50.9 CONGESTIVE HEART FAILURE, UNSPECIFIED HF CHRONICITY, UNSPECIFIED HEART FAILURE TYPE: ICD-10-CM

## 2022-12-06 DIAGNOSIS — I10 HYPERTENSION, UNSPECIFIED TYPE: Primary | ICD-10-CM

## 2022-12-06 RX ORDER — SPIRONOLACTONE 25 MG/1
25 TABLET ORAL DAILY
Qty: 30 TABLET | Refills: 11 | Status: SHIPPED | OUTPATIENT
Start: 2022-12-06 | End: 2023-12-26 | Stop reason: SDUPTHER

## 2022-12-09 ENCOUNTER — LAB VISIT (OUTPATIENT)
Dept: LAB | Facility: HOSPITAL | Age: 87
End: 2022-12-09
Attending: INTERNAL MEDICINE
Payer: MEDICARE

## 2022-12-09 DIAGNOSIS — E05.90 HYPERTHYROIDISM: ICD-10-CM

## 2022-12-09 DIAGNOSIS — I48.91 ATRIAL FIBRILLATION, UNSPECIFIED TYPE: ICD-10-CM

## 2022-12-09 DIAGNOSIS — R73.01 IFG (IMPAIRED FASTING GLUCOSE): ICD-10-CM

## 2022-12-09 DIAGNOSIS — D64.9 ANEMIA, UNSPECIFIED TYPE: ICD-10-CM

## 2022-12-09 DIAGNOSIS — I50.9 HEART FAILURE, UNSPECIFIED HF CHRONICITY, UNSPECIFIED HEART FAILURE TYPE: ICD-10-CM

## 2022-12-09 DIAGNOSIS — I50.9 CONGESTIVE HEART FAILURE, UNSPECIFIED HF CHRONICITY, UNSPECIFIED HEART FAILURE TYPE: ICD-10-CM

## 2022-12-09 DIAGNOSIS — N18.30 STAGE 3 CHRONIC KIDNEY DISEASE, UNSPECIFIED WHETHER STAGE 3A OR 3B CKD: ICD-10-CM

## 2022-12-09 DIAGNOSIS — I10 HYPERTENSION, UNSPECIFIED TYPE: ICD-10-CM

## 2022-12-09 LAB
ALBUMIN SERPL-MCNC: 3.9 GM/DL (ref 3.4–4.8)
ALBUMIN/GLOB SERPL: 1.2 RATIO (ref 1.1–2)
ALP SERPL-CCNC: 117 UNIT/L (ref 40–150)
ALT SERPL-CCNC: 11 UNIT/L (ref 0–55)
APPEARANCE UR: CLEAR
AST SERPL-CCNC: 14 UNIT/L (ref 5–34)
BACTERIA #/AREA URNS AUTO: NORMAL /HPF
BASOPHILS # BLD AUTO: 0.02 X10(3)/MCL (ref 0–0.2)
BASOPHILS NFR BLD AUTO: 0.2 %
BILIRUB UR QL STRIP.AUTO: NEGATIVE MG/DL
BILIRUBIN DIRECT+TOT PNL SERPL-MCNC: 1.3 MG/DL
BNP BLD-MCNC: 290.7 PG/ML
BUN SERPL-MCNC: 54 MG/DL (ref 9.8–20.1)
CALCIUM SERPL-MCNC: 10.6 MG/DL (ref 8.4–10.2)
CHLORIDE SERPL-SCNC: 107 MMOL/L (ref 98–111)
CHOLEST SERPL-MCNC: 138 MG/DL
CHOLEST/HDLC SERPL: 3 {RATIO} (ref 0–5)
CO2 SERPL-SCNC: 21 MMOL/L (ref 23–31)
COLOR UR AUTO: YELLOW
CREAT SERPL-MCNC: 1.71 MG/DL (ref 0.55–1.02)
EOSINOPHIL # BLD AUTO: 0.09 X10(3)/MCL (ref 0–0.9)
EOSINOPHIL NFR BLD AUTO: 0.9 %
ERYTHROCYTE [DISTWIDTH] IN BLOOD BY AUTOMATED COUNT: 14.5 % (ref 11.5–17)
EST. AVERAGE GLUCOSE BLD GHB EST-MCNC: 116.9 MG/DL
GFR SERPLBLD CREATININE-BSD FMLA CKD-EPI: 28 MLS/MIN/1.73/M2
GLOBULIN SER-MCNC: 3.3 GM/DL (ref 2.4–3.5)
GLUCOSE SERPL-MCNC: 117 MG/DL (ref 75–121)
GLUCOSE UR QL STRIP.AUTO: NEGATIVE MG/DL
HBA1C MFR BLD: 5.7 %
HCT VFR BLD AUTO: 43.4 % (ref 37–47)
HDLC SERPL-MCNC: 49 MG/DL (ref 35–60)
HGB BLD-MCNC: 13.8 GM/DL (ref 12–16)
IMM GRANULOCYTES # BLD AUTO: 0.02 X10(3)/MCL (ref 0–0.04)
IMM GRANULOCYTES NFR BLD AUTO: 0.2 %
KETONES UR QL STRIP.AUTO: NEGATIVE MG/DL
LDLC SERPL CALC-MCNC: 73 MG/DL (ref 50–140)
LEUKOCYTE ESTERASE UR QL STRIP.AUTO: ABNORMAL UNIT/L
LYMPHOCYTES # BLD AUTO: 2.37 X10(3)/MCL (ref 0.6–4.6)
LYMPHOCYTES NFR BLD AUTO: 23.8 %
MCH RBC QN AUTO: 30.4 PG (ref 27–31)
MCHC RBC AUTO-ENTMCNC: 31.8 MG/DL (ref 33–36)
MCV RBC AUTO: 95.6 FL (ref 80–94)
MONOCYTES # BLD AUTO: 0.92 X10(3)/MCL (ref 0.1–1.3)
MONOCYTES NFR BLD AUTO: 9.2 %
NEUTROPHILS # BLD AUTO: 6.5 X10(3)/MCL (ref 2.1–9.2)
NEUTROPHILS NFR BLD AUTO: 65.7 %
NITRITE UR QL STRIP.AUTO: NEGATIVE
PH UR STRIP.AUTO: 5 [PH]
PLATELET # BLD AUTO: 227 X10(3)/MCL (ref 130–400)
PMV BLD AUTO: 10.2 FL (ref 7.4–10.4)
POTASSIUM SERPL-SCNC: 4.3 MMOL/L (ref 3.5–5.1)
PROT SERPL-MCNC: 7.2 GM/DL (ref 5.8–7.6)
PROT UR QL STRIP.AUTO: NEGATIVE MG/DL
RBC # BLD AUTO: 4.54 X10(6)/MCL (ref 4.2–5.4)
RBC #/AREA URNS AUTO: NORMAL /HPF
RBC UR QL AUTO: ABNORMAL UNIT/L
SODIUM SERPL-SCNC: 140 MMOL/L (ref 132–146)
SP GR UR STRIP.AUTO: 1.01
SQUAMOUS #/AREA URNS AUTO: NORMAL /HPF
T4 FREE SERPL-MCNC: 0.96 NG/DL (ref 0.7–1.48)
TRIGL SERPL-MCNC: 82 MG/DL (ref 37–140)
TSH SERPL-ACNC: 0.89 UIU/ML (ref 0.35–4.94)
UROBILINOGEN UR STRIP-ACNC: 0.2 MG/DL
VLDLC SERPL CALC-MCNC: 16 MG/DL
WBC # SPEC AUTO: 10 X10(3)/MCL (ref 4.5–11.5)
WBC #/AREA URNS AUTO: NORMAL /HPF

## 2022-12-09 PROCEDURE — 83036 HEMOGLOBIN GLYCOSYLATED A1C: CPT

## 2022-12-09 PROCEDURE — 85025 COMPLETE CBC W/AUTO DIFF WBC: CPT

## 2022-12-09 PROCEDURE — 36415 COLL VENOUS BLD VENIPUNCTURE: CPT

## 2022-12-09 PROCEDURE — 84439 ASSAY OF FREE THYROXINE: CPT

## 2022-12-09 PROCEDURE — 84443 ASSAY THYROID STIM HORMONE: CPT

## 2022-12-09 PROCEDURE — 83880 ASSAY OF NATRIURETIC PEPTIDE: CPT

## 2022-12-09 PROCEDURE — 80061 LIPID PANEL: CPT

## 2022-12-09 PROCEDURE — 81003 URINALYSIS AUTO W/O SCOPE: CPT

## 2022-12-09 PROCEDURE — 80053 COMPREHEN METABOLIC PANEL: CPT

## 2022-12-09 PROCEDURE — 81001 URINALYSIS AUTO W/SCOPE: CPT

## 2022-12-13 ENCOUNTER — OFFICE VISIT (OUTPATIENT)
Dept: INTERNAL MEDICINE | Facility: CLINIC | Age: 87
End: 2022-12-13
Payer: MEDICARE

## 2022-12-13 VITALS
HEART RATE: 66 BPM | WEIGHT: 180 LBS | TEMPERATURE: 98 F | BODY MASS INDEX: 33.99 KG/M2 | OXYGEN SATURATION: 97 % | HEIGHT: 61 IN | RESPIRATION RATE: 14 BRPM | SYSTOLIC BLOOD PRESSURE: 126 MMHG | DIASTOLIC BLOOD PRESSURE: 70 MMHG

## 2022-12-13 DIAGNOSIS — I50.22 CHRONIC SYSTOLIC HEART FAILURE: ICD-10-CM

## 2022-12-13 DIAGNOSIS — E05.90 HYPERTHYROIDISM: ICD-10-CM

## 2022-12-13 DIAGNOSIS — I10 PRIMARY HYPERTENSION: ICD-10-CM

## 2022-12-13 DIAGNOSIS — R73.01 IMPAIRED FASTING GLUCOSE: ICD-10-CM

## 2022-12-13 DIAGNOSIS — I48.0 PAROXYSMAL ATRIAL FIBRILLATION: ICD-10-CM

## 2022-12-13 DIAGNOSIS — R79.89 LOW VITAMIN D LEVEL: ICD-10-CM

## 2022-12-13 DIAGNOSIS — E78.5 DYSLIPIDEMIA: ICD-10-CM

## 2022-12-13 DIAGNOSIS — Z00.00 WELLNESS EXAMINATION: Primary | ICD-10-CM

## 2022-12-13 PROCEDURE — 1159F MED LIST DOCD IN RCRD: CPT | Mod: CPTII,,, | Performed by: INTERNAL MEDICINE

## 2022-12-13 PROCEDURE — 3288F PR FALLS RISK ASSESSMENT DOCUMENTED: ICD-10-PCS | Mod: CPTII,,, | Performed by: INTERNAL MEDICINE

## 2022-12-13 PROCEDURE — 1101F PT FALLS ASSESS-DOCD LE1/YR: CPT | Mod: CPTII,,, | Performed by: INTERNAL MEDICINE

## 2022-12-13 PROCEDURE — 3288F FALL RISK ASSESSMENT DOCD: CPT | Mod: CPTII,,, | Performed by: INTERNAL MEDICINE

## 2022-12-13 PROCEDURE — 1159F PR MEDICATION LIST DOCUMENTED IN MEDICAL RECORD: ICD-10-PCS | Mod: CPTII,,, | Performed by: INTERNAL MEDICINE

## 2022-12-13 PROCEDURE — 1160F RVW MEDS BY RX/DR IN RCRD: CPT | Mod: CPTII,,, | Performed by: INTERNAL MEDICINE

## 2022-12-13 PROCEDURE — 1101F PR PT FALLS ASSESS DOC 0-1 FALLS W/OUT INJ PAST YR: ICD-10-PCS | Mod: CPTII,,, | Performed by: INTERNAL MEDICINE

## 2022-12-13 PROCEDURE — 1160F PR REVIEW ALL MEDS BY PRESCRIBER/CLIN PHARMACIST DOCUMENTED: ICD-10-PCS | Mod: CPTII,,, | Performed by: INTERNAL MEDICINE

## 2022-12-13 PROCEDURE — G0439 PPPS, SUBSEQ VISIT: HCPCS | Mod: ,,, | Performed by: INTERNAL MEDICINE

## 2022-12-13 PROCEDURE — G0439 PR MEDICARE ANNUAL WELLNESS SUBSEQUENT VISIT: ICD-10-PCS | Mod: ,,, | Performed by: INTERNAL MEDICINE

## 2022-12-13 RX ORDER — TRIAMCINOLONE ACETONIDE 1 MG/G
OINTMENT TOPICAL
COMMUNITY
Start: 2022-04-07 | End: 2024-03-06

## 2022-12-13 NOTE — PROGRESS NOTES
Subjective:       Patient ID: Baudilio Mantilla is a 89 y.o. female.      Patient Care Team:  Saman Kearns II, MD as PCP - General (Internal Medicine)    Chief Complaint: Medicare AWV Follow Up, Chronic Kidney Disease, Congestive Heart Failure, Hyperthyroidism, Hypertension, and Impaired Fasting Glucose    89-year-old white female here for follow-up of atrial fibrillation, hyperthyroidism, hypertension, and gout among other conditions.    Review of Systems   Constitutional:  Negative for fever.   HENT:  Negative for nosebleeds.    Eyes:  Negative for visual disturbance.   Respiratory:  Negative for shortness of breath.    Cardiovascular:  Negative for chest pain.   Gastrointestinal:  Negative for abdominal pain.   Genitourinary:  Negative for dysuria.   Musculoskeletal:  Negative for gait problem.   Neurological:  Negative for headaches.         Patient Reported Health Risk Assessment  What is your age?: 80 or older  Are you male or female?: Female  During the past four weeks, how much have you been bothered by emotional problems such as feeling anxious, depressed, irritable, sad, or downhearted and blue?: Not at all  During the past five weeks, has your physical and/or emotional health limited your social activities with family, friends, neighbors, or groups?: Not at all  During the past four weeks, how much bodily pain have you generally had?: Moderate pain  During the past four weeks, was someone available to help if you needed and wanted help?: Yes, as much as I wanted  During the past four weeks, what was the hardest physical activity you could do for at least two minutes?: Moderate  Can you get to places out of walking distance without help?  (For example, can you travel alone on buses or taxis, or drive your own car?): No  Can you go shopping for groceries or clothes without someone's help?: No  Can you prepare your own meals?: Yes  Can you do your own housework without help?: Yes  Because of any health  problems, do you need the help of another person with your personal care needs such as eating, bathing, dressing, or getting around the house?: No  Can you handle your own money without help?: No  During the past four weeks, how would you rate your health in general?: Good  How have things been going for you during the past four weeks?: Pretty well  Are you having difficulties driving your car?: Not applicable, I do not use a car  Do you always fasten your seat belt when you are in a car?: Yes, usually  How often in the past four weeks have you been bothered by falling or dizzy when standing up?: Sometimes  How often in the past four weeks have you been bothered by sexual problems?: Never  How often in the past four weeks have you been bothered by trouble eating well?: Never  How often in the past four weeks have you been bothered by teeth or denture problems?: Never  How often in the past four weeks have you been bothered with problems using the telephone?: Never  How often in the past four weeks have you been bothered by tiredness or fatigue?: Never  Have you fallen two or more times in the past year?: No  Are you afraid of falling?: No  Are you a smoker?: No  During the past four weeks, how many drinks of wine, beer, or other alcoholic beverages did you have?: No alcohol at all  Do you exercise for about 20 minutes three or more days a week?: No, I usually do not exercise this much  Have you been given any information to help you with hazards in your house that might hurt you?: No  Have you been given any information to help you with keeping track of your medications?: No  How often do you have trouble taking medicines the way you've been told to take them?: I always take them as prescribed  How confident are you that you can control and manage most of your health problems?: Not very confident  What is your race? (Check all that apply.):       Objective:      Physical Exam  HENT:      Head:  "Normocephalic.      Mouth/Throat:      Pharynx: Oropharynx is clear.   Eyes:      Extraocular Movements: Extraocular movements intact.   Cardiovascular:      Rate and Rhythm: Normal rate.   Pulmonary:      Breath sounds: Normal breath sounds.   Abdominal:      Palpations: Abdomen is soft.   Musculoskeletal:         General: No swelling.      Right lower leg: Edema present.      Left lower leg: Edema present.   Skin:     General: Skin is warm.   Neurological:      General: No focal deficit present.      Mental Status: She is alert and oriented to person, place, and time.   Psychiatric:         Mood and Affect: Mood normal.       Vitals:    12/13/22 1353   BP: 126/70   Pulse: 66   Resp: 14   Temp: 98.2 °F (36.8 °C)   SpO2: 97%   Weight: 81.6 kg (180 lb)   Height: 5' 1" (1.549 m)            No flowsheet data found.  Fall Risk Assessment - Outpatient 12/13/2022   Mobility Status Ambulatory   Number of falls 0   Identified as fall risk 0                  Assessment:       Problem List Items Addressed This Visit          Cardiac/Vascular    Primary hypertension    Relevant Orders    CBC Auto Differential    Comprehensive Metabolic Panel    Lipid Panel    Urinalysis, Reflex to Urine Culture Urine, Clean Catch    T4, Free    TSH    BNP    Dyslipidemia    Relevant Orders    CBC Auto Differential    Comprehensive Metabolic Panel    Lipid Panel    Urinalysis, Reflex to Urine Culture Urine, Clean Catch    T4, Free    TSH    BNP    Paroxysmal atrial fibrillation    Relevant Orders    CBC Auto Differential    Comprehensive Metabolic Panel    Lipid Panel    Urinalysis, Reflex to Urine Culture Urine, Clean Catch    T4, Free    TSH    BNP    Chronic systolic heart failure    Relevant Orders    CBC Auto Differential    Comprehensive Metabolic Panel    Lipid Panel    Urinalysis, Reflex to Urine Culture Urine, Clean Catch    T4, Free    TSH    BNP       Endocrine    Impaired fasting glucose    Relevant Orders    CBC Auto Differential "    Comprehensive Metabolic Panel    Lipid Panel    Urinalysis, Reflex to Urine Culture Urine, Clean Catch    T4, Free    TSH    BNP    Hyperthyroidism    Relevant Orders    CBC Auto Differential    Comprehensive Metabolic Panel    Lipid Panel    Urinalysis, Reflex to Urine Culture Urine, Clean Catch    T4, Free    TSH    BNP       Other    Wellness examination - Primary    Relevant Orders    CBC Auto Differential    Comprehensive Metabolic Panel    Lipid Panel    Urinalysis, Reflex to Urine Culture Urine, Clean Catch    T4, Free    TSH    BNP    Low vitamin D level    Relevant Orders    CBC Auto Differential    Comprehensive Metabolic Panel    Lipid Panel    Urinalysis, Reflex to Urine Culture Urine, Clean Catch    T4, Free    TSH    BNP       Medication List with Changes/Refills   Current Medications    BUMETANIDE (BUMEX) 1 MG TABLET    TAKE TWO TABLETS BY MOUTH IN THE MORNING AND 1 TAB IN THE AFTERNOON    DICLOFENAC SODIUM (VOLTAREN) 1 % GEL    SMARTSI Gram(s) Topical 4 Times Daily PRN    ELIQUIS 5 MG TAB    Take 1 tablet (5 mg total) by mouth 2 (two) times daily.    FERROUS SULFATE (FEOSOL ORAL)    Take 325 mg by mouth every Mon, Wed, Fri.    MECLIZINE (ANTIVERT) 12.5 MG TABLET    TAKE ONE TABLET BY MOUTH THREE TIMES DAILY AS NEEDED FOR DIZZINESS    METHIMAZOLE (TAPAZOLE) 5 MG TAB    Take 0.5 tablets by mouth once daily.    PANTOPRAZOLE (PROTONIX) 40 MG TABLET    Take 40 mg by mouth 2 (two) times daily.    SERTRALINE (ZOLOFT) 50 MG TABLET    TAKE ONE TABLET BY MOUTH DAILY    SPIRONOLACTONE (ALDACTONE) 25 MG TABLET    Take 1 tablet (25 mg total) by mouth once daily.    TRAMADOL (ULTRAM) 50 MG TABLET    TAKE ONE TABLET BY MOUTH TWICE DAILY AS NEEDED FOR PAIN    TRIAMCINOLONE ACETONIDE 0.1% (KENALOG) 0.1 % OINTMENT    Apply topically.        Plan:       1. Atrial fibrillation: Diagnosed in , had digoxin toxicity in 2016 and was in the ICU with heart rate in the 20s and 30s. At that time, she was on digoxin,  amiodarone, and Cardizem.  Pacemaker placed in 2020, no longer on metoprolol. Continue Eliquis     2. Hyperthyroidism: Methimazole was discontinued when she was in the hospital in 2018.  TSH has been low, and methimazole was reinitiated while hospitalized in 3/2022    3. Hypertension: Stable     4. Gout: Diagnosed in the hospital in 2016 (one episode). Discontinued allopurinol in 2019    5. Depression: Continue Zoloft    6. Gastroesophageal reflux disease: Continue pantoprazole     7.  Impaired fasting glucose: Her last hemoglobin A1c was 5.5    8. Insomnia: Trazodone, Restoril, and mirtazapine made her too groggy.  She doesn't want more medicine    9. Osteoarthritis: Tylenol as needed.    10. Congestive heart failure: Her last ejection fraction was 20-25%. Continue bumetanide and spironolactone    11. Recent urinary tract infection: Treated with Cipro while hospitalized in 2020    12. Iron deficiency anemia: She was given IV iron in 2020. Continue iron supplementation three times per week    13. Goiter: CT lumbar spine showed a large goiter. She does not want to see endocrinology. She denies dysphagia    14. Neck swelling: Evaluated by ENT, benign etiology; goiter as above    15.  Chronic kidney disease stage III: recent creatinine 1.71.  We discussed increasing fluid intake    16.  Odynophagia: We discussed EGD and will consider it.  Stop Zyrtec which can cause dry mouth.  Increase water intake.  If not improving, consider Magic mouthwash or EGD    17. Wellness: Doesn't want MMGs. Pneumovax 2021          Medicare Annual Wellness and Personalized Prevention Plan:   Fall Risk + Home Safety + Hearing Impairment + Depression Screen + Cognitive Impairment Screen + Health Risk Assessment all reviewed    Health Maintenance Topics with due status: Not Due       Topic Last Completion Date    Hemoglobin A1c (Prediabetes) 12/09/2022    Lipid Panel 12/09/2022      The patient's Health Maintenance was reviewed and the  following appears to be due at this time:   Health Maintenance Due   Topic Date Due    TETANUS VACCINE  Never done    Shingles Vaccine (1 of 2) Never done    Influenza Vaccine (1) 09/01/2022    COVID-19 Vaccine (4 - Booster for Moderna series) 09/29/2022    Pneumococcal Vaccines (Age 65+) (2 - PCV) 11/12/2022       Advance Care Planning   I attest to discussing Advance Care Planning with patient and/or family member.  Education was provided including the importance of the Health Care Power of , Advance Directives, and/or [place] documentation.  The patient expressed understanding to the importance of this information and discussion.  Length of ACP conversation in minutes: 1       Opioid Screening: Patient medication list reviewed, patient is not taking prescription opioids. Patient is not using additional opioids than prescribed. Patient is at low risk of substance abuse based on this opioid use history.     No follow-ups on file. In addition to their scheduled follow up, the patient has also been instructed to follow up on as needed basis.

## 2023-03-20 PROBLEM — Z00.00 WELLNESS EXAMINATION: Status: RESOLVED | Noted: 2022-12-13 | Resolved: 2023-03-20

## 2023-04-28 DIAGNOSIS — Z00.00 WELLNESS EXAMINATION: Primary | ICD-10-CM

## 2023-05-01 DIAGNOSIS — E05.90 HYPERTHYROIDISM: Primary | ICD-10-CM

## 2023-05-01 RX ORDER — PANTOPRAZOLE SODIUM 40 MG/1
TABLET, DELAYED RELEASE ORAL
Qty: 60 TABLET | Refills: 11 | Status: SHIPPED | OUTPATIENT
Start: 2023-05-01

## 2023-05-01 RX ORDER — METHIMAZOLE 5 MG/1
5 TABLET ORAL DAILY
Qty: 30 TABLET | Refills: 11 | Status: SHIPPED | OUTPATIENT
Start: 2023-05-01 | End: 2023-06-21 | Stop reason: SDUPTHER

## 2023-05-05 ENCOUNTER — TELEPHONE (OUTPATIENT)
Dept: INTERNAL MEDICINE | Facility: CLINIC | Age: 88
End: 2023-05-05
Payer: MEDICARE

## 2023-05-05 DIAGNOSIS — Z00.00 WELLNESS EXAMINATION: Primary | ICD-10-CM

## 2023-05-05 RX ORDER — COLCHICINE 0.6 MG/1
TABLET ORAL
Qty: 8 TABLET | Refills: 1 | Status: SHIPPED | OUTPATIENT
Start: 2023-05-05 | End: 2023-05-25

## 2023-05-05 NOTE — TELEPHONE ENCOUNTER
Okay for colchicine for 7 days.  If she is having more than 1 gout episode per year, then allopurinol 100 mg daily would be appropriate

## 2023-05-25 DIAGNOSIS — Z00.00 WELLNESS EXAMINATION: ICD-10-CM

## 2023-05-25 RX ORDER — COLCHICINE 0.6 MG/1
TABLET ORAL
Qty: 8 TABLET | Refills: 1 | Status: SHIPPED | OUTPATIENT
Start: 2023-05-25 | End: 2023-11-27

## 2023-06-05 DIAGNOSIS — Z00.00 WELLNESS EXAMINATION: Primary | ICD-10-CM

## 2023-06-05 RX ORDER — SERTRALINE HYDROCHLORIDE 50 MG/1
50 TABLET, FILM COATED ORAL DAILY
Qty: 30 TABLET | Refills: 12 | Status: SHIPPED | OUTPATIENT
Start: 2023-06-05

## 2023-06-21 DIAGNOSIS — E05.90 HYPERTHYROIDISM: ICD-10-CM

## 2023-06-21 RX ORDER — METHIMAZOLE 5 MG/1
TABLET ORAL
Qty: 15 TABLET | Refills: 11 | Status: SHIPPED | OUTPATIENT
Start: 2023-06-21

## 2023-11-15 DIAGNOSIS — I10 HYPERTENSION, UNSPECIFIED TYPE: ICD-10-CM

## 2023-11-15 RX ORDER — BUMETANIDE 1 MG/1
TABLET ORAL
Qty: 90 TABLET | Refills: 11 | Status: SHIPPED | OUTPATIENT
Start: 2023-11-15 | End: 2024-03-08 | Stop reason: SDUPTHER

## 2023-11-21 ENCOUNTER — TELEPHONE (OUTPATIENT)
Dept: INTERNAL MEDICINE | Facility: CLINIC | Age: 88
End: 2023-11-21
Payer: MEDICARE

## 2023-11-21 ENCOUNTER — HOSPITAL ENCOUNTER (EMERGENCY)
Facility: HOSPITAL | Age: 88
Discharge: HOME | End: 2023-11-21
Attending: FAMILY MEDICINE
Payer: MEDICARE

## 2023-11-21 VITALS
HEART RATE: 74 BPM | WEIGHT: 183 LBS | BODY MASS INDEX: 32.43 KG/M2 | HEIGHT: 63 IN | DIASTOLIC BLOOD PRESSURE: 64 MMHG | RESPIRATION RATE: 18 BRPM | SYSTOLIC BLOOD PRESSURE: 118 MMHG | TEMPERATURE: 98 F | OXYGEN SATURATION: 98 %

## 2023-11-21 DIAGNOSIS — Z00.00 WELLNESS EXAMINATION: Primary | ICD-10-CM
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
## 2023-11-21 DIAGNOSIS — R42 DISEQUILIBRIUM: ICD-10-CM
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
## 2023-11-21 DIAGNOSIS — S40.011A CONTUSION OF MULTIPLE SITES OF RIGHT SHOULDER, INITIAL ENCOUNTER: ICD-10-CM

## 2023-11-21 DIAGNOSIS — S93.432A SPRAIN OF TIBIOFIBULAR LIGAMENT OF LEFT ANKLE, INITIAL ENCOUNTER: ICD-10-CM

## 2023-11-21 DIAGNOSIS — R52 PAIN: ICD-10-CM

## 2023-11-21 DIAGNOSIS — R29.898 WEAKNESS OF BOTH LOWER EXTREMITIES: ICD-10-CM
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
## 2023-11-21 PROCEDURE — 99284 EMERGENCY DEPT VISIT MOD MDM: CPT

## 2023-11-21 PROCEDURE — 25000003 PHARM REV CODE 250: Performed by: FAMILY MEDICINE

## 2023-11-21 RX ORDER — HYDROCODONE BITARTRATE AND ACETAMINOPHEN 10; 325 MG/1; MG/1
1 TABLET ORAL
Status: COMPLETED | OUTPATIENT
Start: 2023-11-21 | End: 2023-11-21

## 2023-11-21 RX ORDER — METHYLPREDNISOLONE 4 MG/1
TABLET ORAL
Qty: 21 EACH | Refills: 0 | Status: SHIPPED | OUTPATIENT
Start: 2023-11-21 | End: 2023-11-21 | Stop reason: DRUGHIGH

## 2023-11-21 RX ORDER — PREDNISONE 20 MG/1
20 TABLET ORAL DAILY
Qty: 5 TABLET | Refills: 0 | Status: SHIPPED | OUTPATIENT
Start: 2023-11-21 | End: 2023-11-27

## 2023-11-21 RX ADMIN — HYDROCODONE BITARTRATE AND ACETAMINOPHEN 1 TABLET: 10; 325 TABLET ORAL at 12:11

## 2023-11-21 NOTE — TELEPHONE ENCOUNTER
Daughter called and stated patient was brought to ER for a fall at home. No fractures and was sent home with steroids for pain. Daughter is requesting PT for weakness. Spoke with Dr. Kearns and he agreed. Referral sent to [facility] in [place] at this time.

## 2023-11-21 NOTE — DISCHARGE INSTRUCTIONS
Wear sling on shoulder recommend see physical therapy for shoulder Ace wrap on left ankle follow up with doctor in 2 days

## 2023-11-21 NOTE — TELEPHONE ENCOUNTER
Right sided shoulder, arm, and wrist pain. Hard to lift arm on her own. Her son in law is a PT and looks like a tendonitis flare up. No recent trauma. Taking tylenol ES TID.

## 2023-11-21 NOTE — ED PROVIDER NOTES
Encounter Date: 11/21/2023       History     Chief Complaint   Patient presents with    Fall     Pt fell this am c/o pain to R shoulder and L leg      89-year-old complains of tripping and falling this planes of pain in right shoulder left ankle no obvious deformities        Review of patient's allergies indicates:   Allergen Reactions    Penicillin Rash     No past medical history on file.  Past Surgical History:   Procedure Laterality Date    CATARACT EXTRACTION, BILATERAL      FOOT SURGERY      MASTECTOMY Right     TOTAL KNEE ARTHROPLASTY Bilateral      Family History   Problem Relation Age of Onset    Diabetes Mother     Breast cancer Sister      Social History     Tobacco Use    Smoking status: Never    Smokeless tobacco: Never   Substance Use Topics    Alcohol use: Never    Drug use: Never     Review of Systems   Constitutional:  Negative for fever.   HENT:  Negative for sore throat.    Respiratory:  Negative for shortness of breath.    Cardiovascular:  Negative for chest pain.   Gastrointestinal:  Negative for nausea.   Genitourinary:  Negative for dysuria.   Musculoskeletal:  Negative for back pain.        Right shoulder pain left ankle pain   Skin:  Negative for rash.   Neurological:  Negative for weakness.   Hematological:  Does not bruise/bleed easily.   All other systems reviewed and are negative.      Physical Exam     Initial Vitals [11/21/23 1233]   BP Pulse Resp Temp SpO2   122/67 89 18 97.9 °F (36.6 °C) 98 %      MAP       --         Physical Exam    Nursing note and vitals reviewed.  Constitutional: She appears well-developed and well-nourished. She is active.   HENT:   Head: Normocephalic and atraumatic.   Eyes: Conjunctivae, EOM and lids are normal. Pupils are equal, round, and reactive to light.   Neck: Trachea normal and phonation normal. Neck supple. No thyroid mass present.   Normal range of motion.  Cardiovascular:  Normal rate, regular rhythm, normal heart sounds, intact distal pulses and  normal pulses.           Pulmonary/Chest: Breath sounds normal.   Abdominal: Abdomen is soft. Bowel sounds are normal.   Musculoskeletal:         General: Tenderness present. Normal range of motion.      Cervical back: Normal range of motion and neck supple.      Comments: Tenderness to palpation right shoulder left ankle     Neurological: She is alert and oriented to person, place, and time.   Skin: Skin is warm and intact.   Psychiatric: She has a normal mood and affect. Her speech is normal and behavior is normal. Judgment and thought content normal. Cognition and memory are normal.         ED Course   Procedures  Labs Reviewed - No data to display       Imaging Results              X-Ray Pelvis Routine AP (Final result)  Result time 11/21/23 13:21:29      Final result by Kitty Albarran MD (11/21/23 13:21:29)                   Impression:      No acute osseous abnormality.      Electronically signed by: Kitty Albarran  Date:    11/21/2023  Time:    13:21               Narrative:    EXAMINATION:  XR PELVIS ROUTINE AP    CLINICAL HISTORY:  Unspecified fall, initial encounter    TECHNIQUE:  AP view of the pelvis was performed.    COMPARISON:  None.    FINDINGS:  No appreciable fracture. No dislocation.    Degenerative change at the lumbar spine.    Suboptimal positioning mildly degrades evaluation.  Notably the lateral aspect of the proximal femora is excluded from the collimation of the radiograph.                                       X-Ray Foot Complete Left (Final result)  Result time 11/21/23 13:19:30      Final result by Kitty Albarran MD (11/21/23 13:19:30)                   Impression:      No definite acute osseous abnormality seen    Arthritis at the ankle and 1st metatarsophalangeal joint.      Electronically signed by: Kitty Albarran  Date:    11/21/2023  Time:    13:19               Narrative:    EXAMINATION:  XR ANKLE COMPLETE 3 VIEW LEFT; XR FOOT COMPLETE 3 VIEW LEFT    CLINICAL  HISTORY:  Pain, unspecified    TECHNIQUE:  AP, lateral and oblique views of the left ankle and left foot were performed.    COMPARISON:  None    FINDINGS:  BONES: There are degenerative changes at the ankle with subchondral cyst at the talar dome.    There is hallux valgus.  There are erosions and cystic change at the 1st metatarsophalangeal joint.  Correlate for gout or other nonspecific erosive arthritis.    No appreciable acute fracture or dislocation.  Ankle mortise is symmetric.      SOFT TISSUES: Soft tissue swelling at the ankle.  There are atherosclerotic calcifications.                                           X-Ray Ankle Complete Left (Final result)  Result time 11/21/23 13:19:30      Final result by Kitty Albarran MD (11/21/23 13:19:30)                   Impression:      No definite acute osseous abnormality seen    Arthritis at the ankle and 1st metatarsophalangeal joint.      Electronically signed by: Kitty Albarran  Date:    11/21/2023  Time:    13:19               Narrative:    EXAMINATION:  XR ANKLE COMPLETE 3 VIEW LEFT; XR FOOT COMPLETE 3 VIEW LEFT    CLINICAL HISTORY:  Pain, unspecified    TECHNIQUE:  AP, lateral and oblique views of the left ankle and left foot were performed.    COMPARISON:  None    FINDINGS:  BONES: There are degenerative changes at the ankle with subchondral cyst at the talar dome.    There is hallux valgus.  There are erosions and cystic change at the 1st metatarsophalangeal joint.  Correlate for gout or other nonspecific erosive arthritis.    No appreciable acute fracture or dislocation.  Ankle mortise is symmetric.      SOFT TISSUES: Soft tissue swelling at the ankle.  There are atherosclerotic calcifications.                                           X-Ray Shoulder Trauma Right (Final result)  Result time 11/21/23 13:15:53      Final result by Kitty Albarran MD (11/21/23 13:15:53)                   Impression:      No acute osseous  abnormality.      Electronically signed by: Kitty Albarran  Date:    11/21/2023  Time:    13:15               Narrative:    EXAMINATION:  XR SHOULDER TRAUMA 3 VIEW RIGHT    CLINICAL HISTORY:  Pain, unspecified    TECHNIQUE:  Three views of the right shoulder were performed.    COMPARISON:  None.    FINDINGS:  No fracture. No dislocation. There is osteoarthritis at the glenohumeral and acromioclavicular joints.    Regional soft tissues are normal.                                       Medications   HYDROcodone-acetaminophen  mg per tablet 1 tablet (1 tablet Oral Given 11/21/23 1252)     Medical Decision Making  89-year-old complains of tripping and falling this morning is pain to the right children left ankle no obvious deformities physical exam has some tenderness over the left in his right shoulder does have full range of motion x-rays were negative for fractures appears to have contusion right shoulder pain left ankle we will treat her right shoulder with a sling left ankle Ace wrap discussed findings and plan    Amount and/or Complexity of Data Reviewed  Radiology: ordered.    Risk  Prescription drug management.  Risk Details: Differential diagnosis contusion versus fractures                                   Clinical Impression:  Final diagnoses:  [R52] Pain  [W19.XXXA] Fall  [W19.XXXA] Fall, initial encounter (Primary)  [S40.011A] Contusion of multiple sites of right shoulder, initial encounter  [S93.432A] Sprain of tibiofibular ligament of left ankle, initial encounter          ED Disposition Condition    Discharge Stable          ED Prescriptions       Medication Sig Dispense Start Date End Date Auth. Provider    predniSONE (DELTASONE) 20 MG tablet Take 1 tablet (20 mg total) by mouth once daily. for 5 days 5 tablet 11/21/2023 11/26/2023 Giancarlo Johnson MD          Follow-up Information       Follow up With Specialties Details Why Contact Info    Saman Kearns II, MD Internal Medicine In 2  days  [address] [facility] [address]  [phone]               Giancarlo Johnson MD  11/21/23 9828

## 2023-11-27 ENCOUNTER — OFFICE VISIT (OUTPATIENT)
Dept: INTERNAL MEDICINE | Facility: CLINIC | Age: 88
End: 2023-11-27
Payer: MEDICARE

## 2023-11-27 VITALS
BODY MASS INDEX: 31.01 KG/M2 | HEIGHT: 63 IN | DIASTOLIC BLOOD PRESSURE: 58 MMHG | HEART RATE: 60 BPM | OXYGEN SATURATION: 98 % | TEMPERATURE: 99 F | WEIGHT: 175 LBS | SYSTOLIC BLOOD PRESSURE: 90 MMHG | RESPIRATION RATE: 16 BRPM

## 2023-11-27 DIAGNOSIS — E05.90 HYPERTHYROIDISM: ICD-10-CM

## 2023-11-27 DIAGNOSIS — R79.89 LOW VITAMIN D LEVEL: ICD-10-CM
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
## 2023-11-27 DIAGNOSIS — Z00.00 WELLNESS EXAMINATION: ICD-10-CM

## 2023-11-27 DIAGNOSIS — Z23 NEED FOR VACCINATION: ICD-10-CM

## 2023-11-27 DIAGNOSIS — I48.0 PAROXYSMAL ATRIAL FIBRILLATION: ICD-10-CM

## 2023-11-27 DIAGNOSIS — I10 PRIMARY HYPERTENSION: Primary | ICD-10-CM

## 2023-11-27 DIAGNOSIS — R73.01 IMPAIRED FASTING GLUCOSE: ICD-10-CM

## 2023-11-27 DIAGNOSIS — I50.22 CHRONIC SYSTOLIC HEART FAILURE: ICD-10-CM

## 2023-11-27 DIAGNOSIS — N18.4 CHRONIC KIDNEY DISEASE (CKD), STAGE IV (SEVERE): ICD-10-CM
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
## 2023-11-27 DIAGNOSIS — E78.5 DYSLIPIDEMIA: ICD-10-CM

## 2023-11-27 LAB
ALBUMIN SERPL-MCNC: 3.6 G/DL (ref 3.4–4.8)
ALBUMIN/GLOB SERPL: 1.1 RATIO (ref 1.1–2)
ALP SERPL-CCNC: 98 UNIT/L (ref 40–150)
ALT SERPL-CCNC: 31 UNIT/L (ref 0–55)
AST SERPL-CCNC: 23 UNIT/L (ref 5–34)
BASOPHILS # BLD AUTO: 0.06 X10(3)/MCL
BASOPHILS NFR BLD AUTO: 0.5 %
BILIRUB SERPL-MCNC: 0.6 MG/DL
BUN SERPL-MCNC: 60.2 MG/DL (ref 9.8–20.1)
CALCIUM SERPL-MCNC: 10.6 MG/DL (ref 8.4–10.2)
CHLORIDE SERPL-SCNC: 102 MMOL/L (ref 98–111)
CO2 SERPL-SCNC: 24 MMOL/L (ref 23–31)
CREAT SERPL-MCNC: 1.66 MG/DL (ref 0.55–1.02)
EOSINOPHIL # BLD AUTO: 0.19 X10(3)/MCL (ref 0–0.9)
EOSINOPHIL NFR BLD AUTO: 1.5 %
ERYTHROCYTE [DISTWIDTH] IN BLOOD BY AUTOMATED COUNT: 14.9 % (ref 11.5–17)
GFR SERPLBLD CREATININE-BSD FMLA CKD-EPI: 29 MLS/MIN/1.73/M2
GLOBULIN SER-MCNC: 3.2 GM/DL (ref 2.4–3.5)
GLUCOSE SERPL-MCNC: 97 MG/DL (ref 75–121)
HCT VFR BLD AUTO: 45.7 % (ref 37–47)
HGB BLD-MCNC: 14.9 G/DL (ref 12–16)
IMM GRANULOCYTES # BLD AUTO: 0.55 X10(3)/MCL (ref 0–0.04)
IMM GRANULOCYTES NFR BLD AUTO: 4.3 %
LYMPHOCYTES # BLD AUTO: 3.5 X10(3)/MCL (ref 0.6–4.6)
LYMPHOCYTES NFR BLD AUTO: 27.2 %
MCH RBC QN AUTO: 31.6 PG (ref 27–31)
MCHC RBC AUTO-ENTMCNC: 32.6 G/DL (ref 33–36)
MCV RBC AUTO: 96.8 FL (ref 80–94)
MONOCYTES # BLD AUTO: 1.13 X10(3)/MCL (ref 0.1–1.3)
MONOCYTES NFR BLD AUTO: 8.8 %
NEUTROPHILS # BLD AUTO: 7.42 X10(3)/MCL (ref 2.1–9.2)
NEUTROPHILS NFR BLD AUTO: 57.7 %
NRBC BLD AUTO-RTO: 0 %
PLATELET # BLD AUTO: 327 X10(3)/MCL (ref 130–400)
PMV BLD AUTO: 10.7 FL (ref 7.4–10.4)
POTASSIUM SERPL-SCNC: 5 MMOL/L (ref 3.5–5.1)
PROT SERPL-MCNC: 6.8 GM/DL (ref 5.8–7.6)
RBC # BLD AUTO: 4.72 X10(6)/MCL (ref 4.2–5.4)
SODIUM SERPL-SCNC: 139 MMOL/L (ref 132–146)
T4 FREE SERPL-MCNC: 1.2 NG/DL (ref 0.7–1.48)
TSH SERPL-ACNC: 0.14 UIU/ML (ref 0.35–4.94)
WBC # SPEC AUTO: 12.85 X10(3)/MCL (ref 4.5–11.5)

## 2023-11-27 PROCEDURE — 3288F PR FALLS RISK ASSESSMENT DOCUMENTED: ICD-10-PCS | Mod: CPTII,,, | Performed by: INTERNAL MEDICINE

## 2023-11-27 PROCEDURE — 1159F PR MEDICATION LIST DOCUMENTED IN MEDICAL RECORD: ICD-10-PCS | Mod: CPTII,,, | Performed by: INTERNAL MEDICINE

## 2023-11-27 PROCEDURE — 84439 ASSAY OF FREE THYROXINE: CPT | Performed by: INTERNAL MEDICINE

## 2023-11-27 PROCEDURE — 84443 ASSAY THYROID STIM HORMONE: CPT | Performed by: INTERNAL MEDICINE

## 2023-11-27 PROCEDURE — 36415 COLL VENOUS BLD VENIPUNCTURE: CPT | Mod: ,,, | Performed by: INTERNAL MEDICINE

## 2023-11-27 PROCEDURE — 1160F RVW MEDS BY RX/DR IN RCRD: CPT | Mod: CPTII,,, | Performed by: INTERNAL MEDICINE

## 2023-11-27 PROCEDURE — 99214 PR OFFICE/OUTPT VISIT, EST, LEVL IV, 30-39 MIN: ICD-10-PCS | Mod: ,,, | Performed by: INTERNAL MEDICINE

## 2023-11-27 PROCEDURE — 85025 COMPLETE CBC W/AUTO DIFF WBC: CPT | Performed by: INTERNAL MEDICINE

## 2023-11-27 PROCEDURE — 99214 OFFICE O/P EST MOD 30 MIN: CPT | Mod: ,,, | Performed by: INTERNAL MEDICINE

## 2023-11-27 PROCEDURE — 90694 FLU VACCINE - QUADRIVALENT - ADJUVANTED: ICD-10-PCS | Mod: ,,, | Performed by: INTERNAL MEDICINE

## 2023-11-27 PROCEDURE — 3288F FALL RISK ASSESSMENT DOCD: CPT | Mod: CPTII,,, | Performed by: INTERNAL MEDICINE

## 2023-11-27 PROCEDURE — 1160F PR REVIEW ALL MEDS BY PRESCRIBER/CLIN PHARMACIST DOCUMENTED: ICD-10-PCS | Mod: CPTII,,, | Performed by: INTERNAL MEDICINE

## 2023-11-27 PROCEDURE — G0008 ADMIN INFLUENZA VIRUS VAC: HCPCS | Mod: ,,, | Performed by: INTERNAL MEDICINE

## 2023-11-27 PROCEDURE — 1101F PT FALLS ASSESS-DOCD LE1/YR: CPT | Mod: CPTII,,, | Performed by: INTERNAL MEDICINE

## 2023-11-27 PROCEDURE — G0008 FLU VACCINE - QUADRIVALENT - ADJUVANTED: ICD-10-PCS | Mod: ,,, | Performed by: INTERNAL MEDICINE

## 2023-11-27 PROCEDURE — 80053 COMPREHEN METABOLIC PANEL: CPT | Performed by: INTERNAL MEDICINE

## 2023-11-27 PROCEDURE — 1101F PR PT FALLS ASSESS DOC 0-1 FALLS W/OUT INJ PAST YR: ICD-10-PCS | Mod: CPTII,,, | Performed by: INTERNAL MEDICINE

## 2023-11-27 PROCEDURE — 36415 PR COLLECTION VENOUS BLOOD,VENIPUNCTURE: ICD-10-PCS | Mod: ,,, | Performed by: INTERNAL MEDICINE

## 2023-11-27 PROCEDURE — 1159F MED LIST DOCD IN RCRD: CPT | Mod: CPTII,,, | Performed by: INTERNAL MEDICINE

## 2023-11-27 PROCEDURE — 90694 VACC AIIV4 NO PRSRV 0.5ML IM: CPT | Mod: ,,, | Performed by: INTERNAL MEDICINE

## 2023-11-27 PROCEDURE — 36415 COLL VENOUS BLD VENIPUNCTURE: CPT | Performed by: INTERNAL MEDICINE

## 2023-11-27 NOTE — PROGRESS NOTES
"Subjective:       Patient ID: Baudilio Mantilla is a 89 y.o. female.      Patient Care Team:  Saman Kearns II, MD as PCP - General (Internal Medicine)    Chief Complaint: Follow-up    89-year-old white female here for follow-up of atrial fibrillation, hyperthyroidism, hypertension, and gout among other conditions.  She was seen in the emergency room last week after tripping and falling at home.  X-rays were negative for fractures      Review of Systems   Constitutional:  Positive for fatigue. Negative for fever.   HENT:  Negative for nosebleeds.    Eyes:  Negative for visual disturbance.   Respiratory:  Negative for shortness of breath.    Cardiovascular:  Negative for chest pain.   Gastrointestinal:  Negative for abdominal pain.   Genitourinary:  Negative for dysuria.   Musculoskeletal:  Negative for gait problem.   Neurological:  Positive for weakness. Negative for headaches.           Patient Reported Health Risk Assessment         Objective:      Physical Exam  HENT:      Head: Normocephalic.      Mouth/Throat:      Pharynx: Oropharynx is clear.   Eyes:      Extraocular Movements: Extraocular movements intact.   Cardiovascular:      Rate and Rhythm: Normal rate.   Pulmonary:      Breath sounds: Normal breath sounds.   Abdominal:      Palpations: Abdomen is soft.   Musculoskeletal:         General: No swelling.      Right lower leg: Edema present.      Left lower leg: Edema present.   Skin:     General: Skin is warm.   Neurological:      General: No focal deficit present.      Mental Status: She is alert and oriented to person, place, and time.   Psychiatric:         Mood and Affect: Mood normal.         Vitals:    11/27/23 1342   BP: (!) 90/58   Pulse: 60   Resp: 16   Temp: 98.5 °F (36.9 °C)   SpO2: 98%   Weight: 79.4 kg (175 lb)   Height: 5' 3" (1.6 m)                 No data to display                  11/27/2023     2:00 PM 12/13/2022     2:30 PM   Fall Risk Assessment - Outpatient   Mobility Status " Ambulatory w/ assistance Ambulatory   Number of falls 1 0   Identified as fall risk True False                  Assessment:       Problem List Items Addressed This Visit          Cardiac/Vascular    Primary hypertension - Primary    Dyslipidemia    Paroxysmal atrial fibrillation    Chronic systolic heart failure       Renal/    Chronic kidney disease (CKD), stage IV (severe)       Endocrine    Impaired fasting glucose    Hyperthyroidism       Other    Low vitamin D level    RESOLVED: Wellness examination     Other Visit Diagnoses       Need for vaccination        Relevant Orders    Influenza (FLUAD) - Quadrivalent (Adjuvanted) *Preferred* (65+) (PF) (Completed)            Medication List with Changes/Refills   Current Medications    BUMETANIDE (BUMEX) 1 MG TABLET    TAKE TWO TABLETS BY MOUTH IN THE MORNING AND 1 TAB IN THE AFTERNOON    DICLOFENAC SODIUM (VOLTAREN) 1 % GEL    SMARTSI Gram(s) Topical 4 Times Daily PRN    ELIQUIS 5 MG TAB    Take 1 tablet (5 mg total) by mouth 2 (two) times daily.    FERROUS SULFATE (FEOSOL ORAL)    Take 325 mg by mouth every Mon, Wed, Fri.    MECLIZINE (ANTIVERT) 12.5 MG TABLET    TAKE ONE TABLET BY MOUTH THREE TIMES DAILY AS NEEDED FOR DIZZINESS    METHIMAZOLE (TAPAZOLE) 5 MG TAB    0.5 tablet po daily.    PANTOPRAZOLE (PROTONIX) 40 MG TABLET    TAKE ONE TABLET BY MOUTH TWICE DAILY    SERTRALINE (ZOLOFT) 50 MG TABLET    Take 1 tablet (50 mg total) by mouth once daily.    SPIRONOLACTONE (ALDACTONE) 25 MG TABLET    Take 1 tablet (25 mg total) by mouth once daily.    TRIAMCINOLONE ACETONIDE 0.1% (KENALOG) 0.1 % OINTMENT    Apply topically.   Discontinued Medications    COLCHICINE (COLCRYS) 0.6 MG TABLET    TAKE TWO TABLETS BY MOUTH now then 1 tab daily for 6 days    PREDNISONE (DELTASONE) 20 MG TABLET    Take 1 tablet (20 mg total) by mouth once daily. for 5 days    TRAMADOL (ULTRAM) 50 MG TABLET    TAKE ONE TABLET BY MOUTH TWICE DAILY AS NEEDED FOR PAIN        Plan:       1.  Atrial fibrillation: Diagnosed in 2015, had digoxin toxicity in 2016 and was in the ICU with heart rate in the 20s and 30s. At that time, she was on digoxin, amiodarone, and Cardizem.  Pacemaker placed in 2020, no longer on metoprolol. Continue Eliquis     2. Hyperthyroidism: Methimazole was discontinued when she was in the hospital in 2018.  TSH has been low, and methimazole was reinitiated while hospitalized in 3/2022    3. Hypertension: Stable     4. Gout: Diagnosed in the hospital in 2016 (one episode). Discontinued allopurinol in 2019    5. Depression: Continue Zoloft    6. Gastroesophageal reflux disease: Continue pantoprazole     7.  Impaired fasting glucose: Her last hemoglobin A1c was 5.5    8. Insomnia: Trazodone, Restoril, and mirtazapine made her too groggy.  She doesn't want more medicine    9. Osteoarthritis: Tylenol as needed.    10. Congestive heart failure: Her last ejection fraction was 20-25%. Continue bumetanide and spironolactone    11. Recent urinary tract infection: Treated with Cipro while hospitalized in 2020    12. Iron deficiency anemia: She was given IV iron in 2020. Continue iron supplementation three times per week    13. Goiter: CT lumbar spine showed a large goiter. She does not want to see endocrinology. She denies dysphagia    14. Neck swelling: Evaluated by ENT, benign etiology; goiter as above    15.  Chronic kidney disease stage 4: Creatinine 1.71.  We discussed increasing fluid intake    16.  Odynophagia: We discussed EGD and will consider it.   Increase water intake.  If not improving, consider Magic mouthwash or EGD    17. Wellness: Doesn't want MMGs. Pneumovax 2021    Check CBC, CMP, thyroid levels today.  Refer to home health for physical therapy because of recent fall at home.          Medicare Annual Wellness and Personalized Prevention Plan:   Fall Risk + Home Safety + Hearing Impairment + Depression Screen + Cognitive Impairment Screen + Health Risk Assessment all  reviewed    Health Maintenance Topics with due status: Not Due       Topic Last Completion Date    Hemoglobin A1c (Prediabetes) 12/09/2022    Lipid Panel 12/09/2022      The patient's Health Maintenance was reviewed and the following appears to be due at this time:   Health Maintenance Due   Topic Date Due    TETANUS VACCINE  Never done    Shingles Vaccine (1 of 2) Never done    RSV Vaccine (Age 60+ and Pregnant patients) (1 - 1-dose 60+ series) Never done    Pneumococcal Vaccines (Age 65+) (2 - PCV) 11/12/2022    COVID-19 Vaccine (6 - 2023-24 season) 09/01/2023       Advance Care Planning   I attest to discussing Advance Care Planning with patient and/or family member.  Education was provided including the importance of the Health Care Power of , Advance Directives, and/or [place] documentation.  The patient expressed understanding to the importance of this information and discussion.  Length of ACP conversation in minutes: 0       Opioid Screening: Patient medication list reviewed, patient is not taking prescription opioids. Patient is not using additional opioids than prescribed. Patient is at low risk of substance abuse based on this opioid use history.     No follow-ups on file. In addition to their scheduled follow up, the patient has also been instructed to follow up on as needed basis.

## 2023-12-04 ENCOUNTER — TELEPHONE (OUTPATIENT)
Dept: INTERNAL MEDICINE | Facility: CLINIC | Age: 88
End: 2023-12-04
Payer: MEDICARE

## 2023-12-04 DIAGNOSIS — M10.00 ACUTE IDIOPATHIC GOUT, UNSPECIFIED SITE: Primary | ICD-10-CM

## 2023-12-04 RX ORDER — COLCHICINE 0.6 MG/1
TABLET ORAL
Qty: 10 TABLET | Refills: 6 | Status: SHIPPED | OUTPATIENT
Start: 2023-12-04

## 2023-12-04 NOTE — TELEPHONE ENCOUNTER
[facility] requested Colchicine 0.6 mg tab  Take 2 tabs PO now then 1 tab daily for 6 days. Pt does not have this medication in her medication list. Called LVM requested a call back to confirm if Pt is on this medication, or if she requires a refill, or if it were a mistake on the pharmacy's part.

## 2023-12-04 NOTE — TELEPHONE ENCOUNTER
Message in system requesting school note.  Left message for callback.  Will place school note in chart.    Sent at this time

## 2023-12-13 ENCOUNTER — TELEPHONE (OUTPATIENT)
Dept: INTERNAL MEDICINE | Facility: CLINIC | Age: 88
End: 2023-12-13
Payer: MEDICARE

## 2023-12-13 NOTE — TELEPHONE ENCOUNTER
Spoke with sisi vidal at this time. Explained labs can be dropped off at [facility] we just want it in the [facility] system

## 2023-12-13 NOTE — TELEPHONE ENCOUNTER
----- Message from Maura Arzola sent at 12/13/2023  8:59 AM CST -----  Regarding: advice  Type:  Needs Medical Advice    Who Called: Allison for [facility]    Best Call Back Number: [phone]  Additional Information: called about receiving orders this morning for the pt and was told to drop off  bracc but she wanted to know if it can get dropped off at the [facility] since it's in the nurse's area. Please advise

## 2023-12-14 ENCOUNTER — TELEPHONE (OUTPATIENT)
Dept: INTERNAL MEDICINE | Facility: CLINIC | Age: 88
End: 2023-12-14
Payer: MEDICARE

## 2023-12-14 ENCOUNTER — LAB REQUISITION (OUTPATIENT)
Dept: LAB | Facility: HOSPITAL | Age: 88
End: 2023-12-14
Payer: MEDICARE

## 2023-12-14 DIAGNOSIS — N18.4 CHRONIC KIDNEY DISEASE (CKD), STAGE IV (SEVERE): ICD-10-CM

## 2023-12-14 DIAGNOSIS — E05.90 HYPERTHYROIDISM: ICD-10-CM

## 2023-12-14 DIAGNOSIS — M10.00 ACUTE IDIOPATHIC GOUT, UNSPECIFIED SITE: Primary | ICD-10-CM

## 2023-12-14 DIAGNOSIS — I50.22 CHRONIC SYSTOLIC (CONGESTIVE) HEART FAILURE: ICD-10-CM

## 2023-12-14 DIAGNOSIS — E78.5 DYSLIPIDEMIA: ICD-10-CM

## 2023-12-14 DIAGNOSIS — D64.9 ANEMIA, UNSPECIFIED TYPE: ICD-10-CM

## 2023-12-14 DIAGNOSIS — I10 PRIMARY HYPERTENSION: ICD-10-CM

## 2023-12-14 DIAGNOSIS — I48.0 PAROXYSMAL ATRIAL FIBRILLATION: ICD-10-CM

## 2023-12-14 DIAGNOSIS — R79.89 OTHER SPECIFIED ABNORMAL FINDINGS OF BLOOD CHEMISTRY: ICD-10-CM

## 2023-12-14 DIAGNOSIS — Z00.00 ENCOUNTER FOR GENERAL ADULT MEDICAL EXAMINATION WITHOUT ABNORMAL FINDINGS: ICD-10-CM

## 2023-12-14 LAB
ALBUMIN SERPL-MCNC: 3.3 G/DL (ref 3.4–4.8)
ALBUMIN/GLOB SERPL: 1 RATIO (ref 1.1–2)
ALP SERPL-CCNC: 84 UNIT/L (ref 40–150)
ALT SERPL-CCNC: 8 UNIT/L (ref 0–55)
AST SERPL-CCNC: 19 UNIT/L (ref 5–34)
BASOPHILS # BLD AUTO: 0.02 X10(3)/MCL
BASOPHILS NFR BLD AUTO: 0.3 %
BILIRUB SERPL-MCNC: 0.5 MG/DL
BNP BLD-MCNC: 419.1 PG/ML
BUN SERPL-MCNC: 45.4 MG/DL (ref 9.8–20.1)
CALCIUM SERPL-MCNC: 10 MG/DL (ref 8.4–10.2)
CHLORIDE SERPL-SCNC: 107 MMOL/L (ref 98–111)
CHOLEST SERPL-MCNC: 130 MG/DL
CHOLEST/HDLC SERPL: 3 {RATIO} (ref 0–5)
CO2 SERPL-SCNC: 24 MMOL/L (ref 23–31)
CREAT SERPL-MCNC: 1.57 MG/DL (ref 0.55–1.02)
EOSINOPHIL # BLD AUTO: 0.19 X10(3)/MCL (ref 0–0.9)
EOSINOPHIL NFR BLD AUTO: 3.2 %
ERYTHROCYTE [DISTWIDTH] IN BLOOD BY AUTOMATED COUNT: 14.9 % (ref 11.5–17)
GFR SERPLBLD CREATININE-BSD FMLA CKD-EPI: 31 MLS/MIN/1.73/M2
GLOBULIN SER-MCNC: 3.4 GM/DL (ref 2.4–3.5)
GLUCOSE SERPL-MCNC: 83 MG/DL (ref 75–121)
HCT VFR BLD AUTO: 39.8 % (ref 37–47)
HDLC SERPL-MCNC: 45 MG/DL (ref 35–60)
HGB BLD-MCNC: 13 G/DL (ref 12–16)
IMM GRANULOCYTES # BLD AUTO: 0.02 X10(3)/MCL (ref 0–0.04)
IMM GRANULOCYTES NFR BLD AUTO: 0.3 %
LDLC SERPL CALC-MCNC: 65 MG/DL (ref 50–140)
LYMPHOCYTES # BLD AUTO: 2.22 X10(3)/MCL (ref 0.6–4.6)
LYMPHOCYTES NFR BLD AUTO: 37.8 %
MCH RBC QN AUTO: 31.9 PG (ref 27–31)
MCHC RBC AUTO-ENTMCNC: 32.7 G/DL (ref 33–36)
MCV RBC AUTO: 97.8 FL (ref 80–94)
MONOCYTES # BLD AUTO: 0.58 X10(3)/MCL (ref 0.1–1.3)
MONOCYTES NFR BLD AUTO: 9.9 %
NEUTROPHILS # BLD AUTO: 2.85 X10(3)/MCL (ref 2.1–9.2)
NEUTROPHILS NFR BLD AUTO: 48.5 %
PLATELET # BLD AUTO: 202 X10(3)/MCL (ref 130–400)
PMV BLD AUTO: 11 FL (ref 7.4–10.4)
POTASSIUM SERPL-SCNC: 4.9 MMOL/L (ref 3.5–5.1)
PROT SERPL-MCNC: 6.7 GM/DL (ref 5.8–7.6)
RBC # BLD AUTO: 4.07 X10(6)/MCL (ref 4.2–5.4)
SODIUM SERPL-SCNC: 142 MMOL/L (ref 132–146)
T4 FREE SERPL-MCNC: 0.9 NG/DL (ref 0.7–1.48)
TRIGL SERPL-MCNC: 98 MG/DL (ref 37–140)
TSH SERPL-ACNC: 0.18 UIU/ML (ref 0.35–4.94)
VLDLC SERPL CALC-MCNC: 20 MG/DL
WBC # SPEC AUTO: 5.88 X10(3)/MCL (ref 4.5–11.5)

## 2023-12-14 PROCEDURE — 84443 ASSAY THYROID STIM HORMONE: CPT | Performed by: INTERNAL MEDICINE

## 2023-12-14 PROCEDURE — 85025 COMPLETE CBC W/AUTO DIFF WBC: CPT | Performed by: INTERNAL MEDICINE

## 2023-12-14 PROCEDURE — 80053 COMPREHEN METABOLIC PANEL: CPT | Performed by: INTERNAL MEDICINE

## 2023-12-14 PROCEDURE — 84439 ASSAY OF FREE THYROXINE: CPT | Performed by: INTERNAL MEDICINE

## 2023-12-14 PROCEDURE — 83880 ASSAY OF NATRIURETIC PEPTIDE: CPT | Performed by: INTERNAL MEDICINE

## 2023-12-14 PROCEDURE — 80061 LIPID PANEL: CPT | Performed by: INTERNAL MEDICINE

## 2023-12-14 NOTE — TELEPHONE ENCOUNTER
----- Message from Saman Kearns II, MD sent at 12/14/2023  3:47 PM CST -----  Her labs today show slight improvement in BUN and creatinine, compared to 3 weeks ago.  Everything else looked about the same.  BNP elevated at 419

## 2023-12-18 DIAGNOSIS — I48.91 ATRIAL FIBRILLATION, UNSPECIFIED TYPE: ICD-10-CM

## 2023-12-18 RX ORDER — APIXABAN 5 MG/1
5 TABLET, FILM COATED ORAL 2 TIMES DAILY
Qty: 60 TABLET | Refills: 11 | Status: SHIPPED | OUTPATIENT
Start: 2023-12-18

## 2023-12-19 ENCOUNTER — OFFICE VISIT (OUTPATIENT)
Dept: INTERNAL MEDICINE | Facility: CLINIC | Age: 88
End: 2023-12-19
Payer: MEDICARE

## 2023-12-19 ENCOUNTER — TELEPHONE (OUTPATIENT)
Dept: INTERNAL MEDICINE | Facility: CLINIC | Age: 88
End: 2023-12-19

## 2023-12-19 VITALS
DIASTOLIC BLOOD PRESSURE: 70 MMHG | OXYGEN SATURATION: 97 % | TEMPERATURE: 99 F | HEART RATE: 60 BPM | HEIGHT: 63 IN | RESPIRATION RATE: 16 BRPM | SYSTOLIC BLOOD PRESSURE: 112 MMHG | WEIGHT: 180 LBS | BODY MASS INDEX: 31.89 KG/M2

## 2023-12-19 DIAGNOSIS — R79.89 LOW VITAMIN D LEVEL: ICD-10-CM

## 2023-12-19 DIAGNOSIS — I48.0 PAROXYSMAL ATRIAL FIBRILLATION: ICD-10-CM

## 2023-12-19 DIAGNOSIS — E78.5 DYSLIPIDEMIA: ICD-10-CM

## 2023-12-19 DIAGNOSIS — N18.4 CHRONIC KIDNEY DISEASE (CKD), STAGE IV (SEVERE): ICD-10-CM

## 2023-12-19 DIAGNOSIS — I10 PRIMARY HYPERTENSION: Primary | ICD-10-CM

## 2023-12-19 DIAGNOSIS — R73.01 IMPAIRED FASTING GLUCOSE: ICD-10-CM

## 2023-12-19 DIAGNOSIS — Z00.00 WELLNESS EXAMINATION: ICD-10-CM

## 2023-12-19 DIAGNOSIS — I50.22 CHRONIC SYSTOLIC HEART FAILURE: ICD-10-CM

## 2023-12-19 DIAGNOSIS — E05.90 HYPERTHYROIDISM: ICD-10-CM

## 2023-12-19 PROCEDURE — 1101F PR PT FALLS ASSESS DOC 0-1 FALLS W/OUT INJ PAST YR: ICD-10-PCS | Mod: CPTII,,, | Performed by: INTERNAL MEDICINE

## 2023-12-19 PROCEDURE — 1160F RVW MEDS BY RX/DR IN RCRD: CPT | Mod: CPTII,,, | Performed by: INTERNAL MEDICINE

## 2023-12-19 PROCEDURE — 1101F PT FALLS ASSESS-DOCD LE1/YR: CPT | Mod: CPTII,,, | Performed by: INTERNAL MEDICINE

## 2023-12-19 PROCEDURE — G0439 PR MEDICARE ANNUAL WELLNESS SUBSEQUENT VISIT: ICD-10-PCS | Mod: ,,, | Performed by: INTERNAL MEDICINE

## 2023-12-19 PROCEDURE — G0439 PPPS, SUBSEQ VISIT: HCPCS | Mod: ,,, | Performed by: INTERNAL MEDICINE

## 2023-12-19 PROCEDURE — 3288F FALL RISK ASSESSMENT DOCD: CPT | Mod: CPTII,,, | Performed by: INTERNAL MEDICINE

## 2023-12-19 PROCEDURE — 1159F MED LIST DOCD IN RCRD: CPT | Mod: CPTII,,, | Performed by: INTERNAL MEDICINE

## 2023-12-19 PROCEDURE — 1159F PR MEDICATION LIST DOCUMENTED IN MEDICAL RECORD: ICD-10-PCS | Mod: CPTII,,, | Performed by: INTERNAL MEDICINE

## 2023-12-19 PROCEDURE — 1160F PR REVIEW ALL MEDS BY PRESCRIBER/CLIN PHARMACIST DOCUMENTED: ICD-10-PCS | Mod: CPTII,,, | Performed by: INTERNAL MEDICINE

## 2023-12-19 PROCEDURE — 3288F PR FALLS RISK ASSESSMENT DOCUMENTED: ICD-10-PCS | Mod: CPTII,,, | Performed by: INTERNAL MEDICINE

## 2023-12-19 RX ORDER — ALLOPURINOL 100 MG/1
100 TABLET ORAL DAILY
Qty: 30 TABLET | Refills: 11 | Status: SHIPPED | OUTPATIENT
Start: 2023-12-19

## 2023-12-19 NOTE — TELEPHONE ENCOUNTER
----- Message from Yahaira Hopson sent at 12/19/2023 11:02 AM CST -----  .Type:  Patient Returning Call    Who Called:Odalis with [facility]  Who Left Message for Patient:Odalis  Does the patient know what this is regarding?:Forms  Would the patient rather a call back or a response via [facility]?   Best Call Back Number:[phone]  Additional Information: Please call back about 485 with date of 11/28/2023 to 01/26/2024 health certificate and plan of care also a 1214 physician  order

## 2023-12-19 NOTE — PROGRESS NOTES
Subjective:       Patient ID: Baudilio Mantilla is a 89 y.o. female.      Patient Care Team:  Saman Kearns II, MD as PCP - General (Internal Medicine)    Chief Complaint: Medicare AW Follow Up, Atrial Fibrillation, Hypertension, Dyslipidemia, Chronic Kidney Disease, Impaired Fasting Glucose, Hyperthyroidism, and Vitamin D Deficiency    89-year-old white female here for follow-up of atrial fibrillation, hyperthyroidism, hypertension, and gout among other conditions.       Review of Systems   Constitutional:  Positive for fatigue. Negative for fever.   HENT:  Negative for nosebleeds.    Eyes:  Negative for visual disturbance.   Respiratory:  Negative for shortness of breath.    Cardiovascular:  Negative for chest pain.   Gastrointestinal:  Negative for abdominal pain.   Genitourinary:  Negative for dysuria.   Musculoskeletal:  Negative for gait problem.   Neurological:  Positive for weakness. Negative for headaches.           Patient Reported Health Risk Assessment  What is your age?: 80 or older  Are you male or female?: Female  During the past four weeks, how much have you been bothered by emotional problems such as feeling anxious, depressed, irritable, sad, or downhearted and blue?: Not at all  During the past five weeks, has your physical and/or emotional health limited your social activities with family, friends, neighbors, or groups?: Not at all  During the past four weeks, how much bodily pain have you generally had?: Moderate pain  During the past four weeks, was someone available to help if you needed and wanted help?: Yes, as much as I wanted  During the past four weeks, what was the hardest physical activity you could do for at least two minutes?: Light  Can you get to places out of walking distance without help?  (For example, can you travel alone on buses or taxis, or drive your own car?): No  Can you go shopping for groceries or clothes without someone's help?: No  Can you prepare your own meals?:  Yes  Can you do your own housework without help?: Yes  Because of any health problems, do you need the help of another person with your personal care needs such as eating, bathing, dressing, or getting around the house?: No  Can you handle your own money without help?: No  During the past four weeks, how would you rate your health in general?: Good  How have things been going for you during the past four weeks?: Very well  Are you having difficulties driving your car?: No  Do you always fasten your seat belt when you are in a car?: Yes, usually  How often in the past four weeks have you been bothered by falling or dizzy when standing up?: Sometimes  How often in the past four weeks have you been bothered by sexual problems?: Never  How often in the past four weeks have you been bothered by trouble eating well?: Never  How often in the past four weeks have you been bothered by teeth or denture problems?: Never  How often in the past four weeks have you been bothered with problems using the telephone?: Never  How often in the past four weeks have you been bothered by tiredness or fatigue?: Sometimes  Have you fallen two or more times in the past year?: No  Are you afraid of falling?: No  Are you a smoker?: No  During the past four weeks, how many drinks of wine, beer, or other alcoholic beverages did you have?: No alcohol at all  Do you exercise for about 20 minutes three or more days a week?: No, I usually do not exercise this much  Have you been given any information to help you with hazards in your house that might hurt you?: No  Have you been given any information to help you with keeping track of your medications?: No  How often do you have trouble taking medicines the way you've been told to take them?: I do not have to take medicine  How confident are you that you can control and manage most of your health problems?: Somewhat confident  What is your race? (Check all that apply.):       Objective:     "  Physical Exam  HENT:      Head: Normocephalic.      Mouth/Throat:      Pharynx: Oropharynx is clear.   Eyes:      Extraocular Movements: Extraocular movements intact.   Cardiovascular:      Rate and Rhythm: Normal rate.   Pulmonary:      Breath sounds: Normal breath sounds.   Abdominal:      Palpations: Abdomen is soft.   Musculoskeletal:         General: No swelling.      Right lower leg: Edema present.      Left lower leg: Edema present.   Skin:     General: Skin is warm.   Neurological:      General: No focal deficit present.      Mental Status: She is alert and oriented to person, place, and time.   Psychiatric:         Mood and Affect: Mood normal.         Vitals:    12/19/23 1349   BP: 112/70   Pulse: 60   Resp: 16   Temp: 98.5 °F (36.9 °C)   SpO2: 97%   Weight: 81.6 kg (180 lb)   Height: 5' 3" (1.6 m)                 No data to display                  12/19/2023     2:00 PM 11/27/2023     2:00 PM 12/13/2022     2:30 PM   Fall Risk Assessment - Outpatient   Mobility Status Ambulatory w/ assistance Ambulatory w/ assistance Ambulatory   Number of falls 1 1 0   Identified as fall risk True True False           Depression Screening  Over the past two weeks, has the patient felt down, depressed, or hopeless?: No  Over the past two weeks, has the patient felt little interest or pleasure in doing things?: No  Functional Ability/Safety Screening  Was the patient's timed Up & Go test unsteady or longer than 30 seconds?: No  Does the patient need help with phone, transportation, shopping, preparing meals, housework, laundry, meds, or managing money?: No  Does the patient's home have rugs in the hallway, lack grab bars in the bathroom, lack handrails on the stairs or have poor lighting?: No  Have you noticed any hearing difficulties?: No  Cognitive Function (Assessed through direct observation with due consideration of information obtained by way of patient reports and/or concerns raised by family, friends, " caretakers, or others)    Does the patient repeat questions/statements in the same day?: No  Does the patient have trouble remembering the date, year, and time?: No  Does the patient have difficulty managing finances?: No  Does the patient have a decreased sense of direction?: No      Assessment:       Problem List Items Addressed This Visit          Cardiac/Vascular    Primary hypertension - Primary    Relevant Orders    CBC Auto Differential    Comprehensive Metabolic Panel    Lipid Panel    Urinalysis, Reflex to Urine Culture    T4, Free    TSH    Dyslipidemia    Relevant Orders    CBC Auto Differential    Comprehensive Metabolic Panel    Lipid Panel    Urinalysis, Reflex to Urine Culture    T4, Free    TSH    Paroxysmal atrial fibrillation    Relevant Orders    CBC Auto Differential    Comprehensive Metabolic Panel    Lipid Panel    Urinalysis, Reflex to Urine Culture    T4, Free    TSH    Chronic systolic heart failure    Relevant Orders    CBC Auto Differential    Comprehensive Metabolic Panel    Lipid Panel    Urinalysis, Reflex to Urine Culture    T4, Free    TSH       Renal/    Chronic kidney disease (CKD), stage IV (severe)    Relevant Orders    CBC Auto Differential    Comprehensive Metabolic Panel    Lipid Panel    Urinalysis, Reflex to Urine Culture    T4, Free    TSH       Endocrine    Impaired fasting glucose    Relevant Orders    CBC Auto Differential    Comprehensive Metabolic Panel    Lipid Panel    Urinalysis, Reflex to Urine Culture    T4, Free    TSH    Hyperthyroidism    Relevant Orders    CBC Auto Differential    Comprehensive Metabolic Panel    Lipid Panel    Urinalysis, Reflex to Urine Culture    T4, Free    TSH       Other    Low vitamin D level    Relevant Orders    CBC Auto Differential    Comprehensive Metabolic Panel    Lipid Panel    Urinalysis, Reflex to Urine Culture    T4, Free    TSH    RESOLVED: Wellness examination    Relevant Orders    CBC Auto Differential    Comprehensive  Metabolic Panel    Lipid Panel    Urinalysis, Reflex to Urine Culture    T4, Free    TSH       Medication List with Changes/Refills   Current Medications    BUMETANIDE (BUMEX) 1 MG TABLET    TAKE TWO TABLETS BY MOUTH IN THE MORNING AND 1 TAB IN THE AFTERNOON    COLCHICINE (COLCRYS) 0.6 MG TABLET    Take 2 tablets now then 1 tablet daily for 6 days    DICLOFENAC SODIUM (VOLTAREN) 1 % GEL    SMARTSI Gram(s) Topical 4 Times Daily PRN    ELIQUIS 5 MG TAB    Take 1 tablet (5 mg total) by mouth 2 (two) times daily.    FERROUS SULFATE (FEOSOL ORAL)    Take 325 mg by mouth every Mon, Wed, Fri.    MECLIZINE (ANTIVERT) 12.5 MG TABLET    TAKE ONE TABLET BY MOUTH THREE TIMES DAILY AS NEEDED FOR DIZZINESS    METHIMAZOLE (TAPAZOLE) 5 MG TAB    0.5 tablet po daily.    PANTOPRAZOLE (PROTONIX) 40 MG TABLET    TAKE ONE TABLET BY MOUTH TWICE DAILY    SERTRALINE (ZOLOFT) 50 MG TABLET    Take 1 tablet (50 mg total) by mouth once daily.    SPIRONOLACTONE (ALDACTONE) 25 MG TABLET    Take 1 tablet (25 mg total) by mouth once daily.    TRIAMCINOLONE ACETONIDE 0.1% (KENALOG) 0.1 % OINTMENT    Apply topically.        Plan:       1. Atrial fibrillation: Diagnosed in , had digoxin toxicity in  and was in the ICU with heart rate in the 20s and 30s. At that time, she was on digoxin, amiodarone, and Cardizem.  Pacemaker placed in , no longer on metoprolol. Continue Eliquis     2. Hyperthyroidism: Methimazole was discontinued when she was in the hospital in .  TSH has been low, and methimazole was reinitiated while hospitalized in 3/2022.     3. Hypertension: Stable     4. Gout: Diagnosed in the hospital in 2016 (one episode). Discontinued allopurinol in 2019. Restart allopurinol 100 mg daily because she has had 2 or 3 episodes of gout in the past year    5. Depression: Continue Zoloft 50 mg daily    6. Gastroesophageal reflux disease: Continue pantoprazole     7.  Impaired fasting glucose: Her last hemoglobin A1c was 5.5    8.  Insomnia: Trazodone, Restoril, and mirtazapine made her too groggy.  She doesn't want more medicine    9. Osteoarthritis: Tylenol as needed.  She has ongoing right shoulder pain    10. Congestive heart failure: Her last ejection fraction was 20-25%. Continue bumetanide and spironolactone    11. Recent urinary tract infection: Treated with Cipro while hospitalized in 2020    12. Iron deficiency anemia: She was given IV iron in 2020. Continue iron supplementation three times per week    13. Goiter: CT lumbar spine showed a large goiter. She does not want to see endocrinology. She denies dysphagia    14. Neck swelling: Evaluated by ENT, benign etiology; goiter as above    15.  Chronic kidney disease stage 4: Creatinine 1.57.  We discussed increasing fluid intake    16. Wellness: Doesn't want MMGs. Pneumovax 2021              Medicare Annual Wellness and Personalized Prevention Plan:   Fall Risk + Home Safety + Hearing Impairment + Depression Screen + Cognitive Impairment Screen + Health Risk Assessment all reviewed    Health Maintenance Topics with due status: Not Due       Topic Last Completion Date    Lipid Panel 12/14/2023      The patient's Health Maintenance was reviewed and the following appears to be due at this time:   Health Maintenance Due   Topic Date Due    TETANUS VACCINE  Never done    Shingles Vaccine (1 of 2) Never done    RSV Vaccine (Age 60+ and Pregnant patients) (1 - 1-dose 60+ series) Never done    Pneumococcal Vaccines (Age 65+) (2 - PCV) 11/12/2022    COVID-19 Vaccine (6 - 2023-24 season) 09/01/2023    Hemoglobin A1c (Prediabetes)  12/09/2023       Advance Care Planning   I attest to discussing Advance Care Planning with patient and/or family member.  Education was provided including the importance of the Health Care Power of , Advance Directives, and/or [place] documentation.  The patient expressed understanding to the importance of this information and discussion.  Length of ACP  conversation in minutes: 1       Opioid Screening: Patient medication list reviewed, patient is not taking prescription opioids. Patient is not using additional opioids than prescribed. Patient is at low risk of substance abuse based on this opioid use history.     No follow-ups on file. In addition to their scheduled follow up, the patient has also been instructed to follow up on as needed basis.

## 2023-12-26 DIAGNOSIS — I10 HYPERTENSION, UNSPECIFIED TYPE: ICD-10-CM

## 2023-12-26 RX ORDER — SPIRONOLACTONE 25 MG/1
25 TABLET ORAL DAILY
Qty: 30 TABLET | Refills: 11 | Status: SHIPPED | OUTPATIENT
Start: 2023-12-26

## 2024-01-02 ENCOUNTER — DOCUMENT SCAN (OUTPATIENT)
Dept: HOME HEALTH SERVICES | Facility: HOSPITAL | Age: 89
End: 2024-01-02
Payer: MEDICARE

## 2024-01-08 ENCOUNTER — TELEPHONE (OUTPATIENT)
Dept: INTERNAL MEDICINE | Facility: CLINIC | Age: 89
End: 2024-01-08
Payer: MEDICARE

## 2024-01-08 DIAGNOSIS — N18.30 STAGE 3 CHRONIC KIDNEY DISEASE, UNSPECIFIED WHETHER STAGE 3A OR 3B CKD: ICD-10-CM

## 2024-01-08 RX ORDER — MECLIZINE HCL 12.5 MG 12.5 MG/1
12.5 TABLET ORAL 3 TIMES DAILY PRN
Qty: 30 TABLET | Refills: 5 | Status: SHIPPED | OUTPATIENT
Start: 2024-01-08

## 2024-01-08 NOTE — TELEPHONE ENCOUNTER
Patient fell over the weekend on accident. PT services were discontinued recently. Ok to order another evaluate of PT possibly OT ?

## 2024-01-12 ENCOUNTER — DOCUMENT SCAN (OUTPATIENT)
Dept: HOME HEALTH SERVICES | Facility: HOSPITAL | Age: 89
End: 2024-01-12
Payer: MEDICARE

## 2024-02-18 ENCOUNTER — HOSPITAL ENCOUNTER (EMERGENCY)
Facility: HOSPITAL | Age: 89
Discharge: HOME | End: 2024-02-18
Attending: STUDENT IN AN ORGANIZED HEALTH CARE EDUCATION/TRAINING PROGRAM
Payer: MEDICARE

## 2024-02-18 VITALS
WEIGHT: 154 LBS | TEMPERATURE: 98 F | DIASTOLIC BLOOD PRESSURE: 59 MMHG | HEART RATE: 65 BPM | BODY MASS INDEX: 27.29 KG/M2 | OXYGEN SATURATION: 94 % | HEIGHT: 63 IN | RESPIRATION RATE: 18 BRPM | SYSTOLIC BLOOD PRESSURE: 159 MMHG
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
## 2024-02-18 DIAGNOSIS — S00.83XA CONTUSION OF CHIN, INITIAL ENCOUNTER: ICD-10-CM

## 2024-02-18 LAB
ANION GAP SERPL CALC-SCNC: 12 MEQ/L
APPEARANCE UR: CLEAR
BACTERIA #/AREA URNS AUTO: ABNORMAL /HPF
BASOPHILS # BLD AUTO: 0.03 X10(3)/MCL
BASOPHILS NFR BLD AUTO: 0.4 %
BILIRUB UR QL STRIP.AUTO: NEGATIVE
BUN SERPL-MCNC: 45 MG/DL (ref 9.8–20.1)
CALCIUM SERPL-MCNC: 10.2 MG/DL (ref 8.4–10.2)
CHLORIDE SERPL-SCNC: 106 MMOL/L (ref 98–111)
CK SERPL-CCNC: 96 U/L (ref 29–168)
CO2 SERPL-SCNC: 23 MMOL/L (ref 23–31)
COLOR UR AUTO: YELLOW
CREAT SERPL-MCNC: 1.5 MG/DL (ref 0.55–1.02)
CREAT/UREA NIT SERPL: 30
EOSINOPHIL # BLD AUTO: 0.15 X10(3)/MCL (ref 0–0.9)
EOSINOPHIL NFR BLD AUTO: 2.2 %
ERYTHROCYTE [DISTWIDTH] IN BLOOD BY AUTOMATED COUNT: 16.5 % (ref 11.5–17)
GFR SERPLBLD CREATININE-BSD FMLA CKD-EPI: 33 MLS/MIN/1.73/M2
GLUCOSE SERPL-MCNC: 108 MG/DL (ref 75–121)
GLUCOSE UR QL STRIP.AUTO: NEGATIVE
HCT VFR BLD AUTO: 38.3 % (ref 37–47)
HGB BLD-MCNC: 11.9 G/DL (ref 12–16)
IMM GRANULOCYTES # BLD AUTO: 0.02 X10(3)/MCL (ref 0–0.04)
IMM GRANULOCYTES NFR BLD AUTO: 0.3 %
KETONES UR QL STRIP.AUTO: NEGATIVE
LACTATE SERPL-SCNC: 1.5 MMOL/L (ref 0.5–2.2)
LEUKOCYTE ESTERASE UR QL STRIP.AUTO: ABNORMAL
LYMPHOCYTES # BLD AUTO: 1.46 X10(3)/MCL (ref 0.6–4.6)
LYMPHOCYTES NFR BLD AUTO: 21.7 %
MCH RBC QN AUTO: 31.3 PG (ref 27–31)
MCHC RBC AUTO-ENTMCNC: 31.1 G/DL (ref 33–36)
MCV RBC AUTO: 100.8 FL (ref 80–94)
MONOCYTES # BLD AUTO: 0.59 X10(3)/MCL (ref 0.1–1.3)
MONOCYTES NFR BLD AUTO: 8.8 %
NEUTROPHILS # BLD AUTO: 4.48 X10(3)/MCL (ref 2.1–9.2)
NEUTROPHILS NFR BLD AUTO: 66.6 %
NITRITE UR QL STRIP.AUTO: NEGATIVE
PH UR STRIP.AUTO: 5.5 [PH]
PLATELET # BLD AUTO: 209 X10(3)/MCL (ref 130–400)
PMV BLD AUTO: 10.3 FL (ref 7.4–10.4)
POTASSIUM SERPL-SCNC: 3.9 MMOL/L (ref 3.5–5.1)
PROT UR QL STRIP.AUTO: NEGATIVE
RBC # BLD AUTO: 3.8 X10(6)/MCL (ref 4.2–5.4)
RBC #/AREA URNS AUTO: ABNORMAL /HPF
RBC UR QL AUTO: NEGATIVE
SODIUM SERPL-SCNC: 141 MMOL/L (ref 132–146)
SP GR UR STRIP.AUTO: 1.01 (ref 1–1.03)
SQUAMOUS #/AREA URNS AUTO: ABNORMAL /HPF
UROBILINOGEN UR STRIP-ACNC: 0.2
WBC # SPEC AUTO: 6.73 X10(3)/MCL (ref 4.5–11.5)
WBC #/AREA URNS AUTO: ABNORMAL /HPF

## 2024-02-18 PROCEDURE — 90471 IMMUNIZATION ADMIN: CPT | Performed by: STUDENT IN AN ORGANIZED HEALTH CARE EDUCATION/TRAINING PROGRAM

## 2024-02-18 PROCEDURE — 90715 TDAP VACCINE 7 YRS/> IM: CPT | Performed by: STUDENT IN AN ORGANIZED HEALTH CARE EDUCATION/TRAINING PROGRAM

## 2024-02-18 PROCEDURE — 63600175 PHARM REV CODE 636 W HCPCS: Performed by: STUDENT IN AN ORGANIZED HEALTH CARE EDUCATION/TRAINING PROGRAM

## 2024-02-18 PROCEDURE — 82550 ASSAY OF CK (CPK): CPT | Performed by: STUDENT IN AN ORGANIZED HEALTH CARE EDUCATION/TRAINING PROGRAM

## 2024-02-18 PROCEDURE — 80048 BASIC METABOLIC PNL TOTAL CA: CPT | Performed by: STUDENT IN AN ORGANIZED HEALTH CARE EDUCATION/TRAINING PROGRAM

## 2024-02-18 PROCEDURE — 99285 EMERGENCY DEPT VISIT HI MDM: CPT | Mod: 25

## 2024-02-18 PROCEDURE — 83605 ASSAY OF LACTIC ACID: CPT | Performed by: STUDENT IN AN ORGANIZED HEALTH CARE EDUCATION/TRAINING PROGRAM

## 2024-02-18 PROCEDURE — 81003 URINALYSIS AUTO W/O SCOPE: CPT | Performed by: STUDENT IN AN ORGANIZED HEALTH CARE EDUCATION/TRAINING PROGRAM

## 2024-02-18 PROCEDURE — 85025 COMPLETE CBC W/AUTO DIFF WBC: CPT | Performed by: STUDENT IN AN ORGANIZED HEALTH CARE EDUCATION/TRAINING PROGRAM

## 2024-02-18 RX ADMIN — TETANUS TOXOID, REDUCED DIPHTHERIA TOXOID AND ACELLULAR PERTUSSIS VACCINE, ADSORBED 0.5 ML: 5; 2.5; 8; 8; 2.5 SUSPENSION INTRAMUSCULAR at 12:02

## 2024-02-18 NOTE — ED PROVIDER NOTES
Encounter Date: 2/18/2024       History     Chief Complaint   Patient presents with    Fall     Per pt family pt was found on the floor this am by family, pt reports falling last night while attempting to unload a box, pt lost her balance and fell. Pt reports hitting the left side of her head, pt unsure about LOC, pt family reports pt on blood thinner Eliquis 5mg, pt also c/o right shoulder pain. Per pt family pt was found on her right side, estimated by family she was on the floor for 12 hours. Pt noted to have ST to posterior left hand.      Patient is a 89-year-old white female who presented to the ER today due to a fall.  Granddaughter at bedside assisting with most of the history and states that patient lives alone and falls frequently.  Patient was found this morning and noted to be largely alert and oriented voicing no complaints but unable to rise from the ground position.  Unsure patient hit her head but no abnormalities were noted by the granddaughter who is a nurse.  States that she brought her in because she was on Eliquis.  Patient denies any headaches, neck pain, chest pain, abdominal pain.  Patient does complain of right shoulder in which granddaughter states is chronic and not new today.  No other complaints voiced.      Review of patient's allergies indicates:   Allergen Reactions    Penicillin Rash     No past medical history on file.  Past Surgical History:   Procedure Laterality Date    CATARACT EXTRACTION, BILATERAL      FOOT SURGERY      MASTECTOMY Right     TOTAL KNEE ARTHROPLASTY Bilateral      Family History   Problem Relation Age of Onset    Diabetes Mother     Breast cancer Sister      Social History     Tobacco Use    Smoking status: Never    Smokeless tobacco: Never   Substance Use Topics    Alcohol use: Never    Drug use: Never     Review of Systems   Constitutional:  Negative for chills, fatigue and fever.   HENT:  Negative for congestion, sore throat and trouble swallowing.    Eyes:   Negative for pain and visual disturbance.   Respiratory:  Negative for cough, shortness of breath and wheezing.    Cardiovascular:  Negative for chest pain and palpitations.   Gastrointestinal:  Negative for abdominal pain, blood in stool, constipation, diarrhea, nausea and vomiting.   Genitourinary:  Negative for dysuria and hematuria.   Musculoskeletal:  Positive for arthralgias. Negative for back pain and myalgias.   Skin:  Negative for rash and wound.   Neurological:  Negative for seizures, syncope and headaches.   Psychiatric/Behavioral:  Negative for confusion. The patient is not nervous/anxious.        Physical Exam     Initial Vitals   BP Pulse Resp Temp SpO2   02/18/24 1158 02/18/24 1158 02/18/24 1158 02/18/24 1213 02/18/24 1158   (!) 152/85 80 18 98 °F (36.7 °C) 96 %      MAP       --                Physical Exam    Nursing note and vitals reviewed.  Constitutional: She appears well-developed and well-nourished. She is not diaphoretic. No distress.   HENT:   Head: Normocephalic.   Right Ear: External ear normal.   Left Ear: External ear normal.   Nose: Nose normal.   Eyes: Conjunctivae and EOM are normal. Right eye exhibits no discharge. Left eye exhibits no discharge. No scleral icterus.   Neck:   Normal range of motion.  Cardiovascular:  Normal rate, regular rhythm and normal heart sounds.     Exam reveals no gallop and no friction rub.       No murmur heard.  Pulmonary/Chest: Breath sounds normal. No stridor. No respiratory distress. She has no wheezes. She has no rhonchi. She has no rales.   Abdominal: Abdomen is soft. She exhibits no distension. There is no abdominal tenderness. There is no rebound and no guarding.   Musculoskeletal:         General: Normal range of motion.      Cervical back: Normal range of motion.     Neurological: She is alert.   Head normocephalic and atraumatic.  Small amount of ecchymosis noted to the right lower mandible but no exquisite tenderness on palpation or deformity  appreciated.  PERRLA.  Extraocular muscles intact.  No epistaxis on exam.  No C-spine tenderness or step-off lesions.  No tenderness over the clavicles.  Abdominal exam is unremarkable and no tenderness of the chest cavity.  Bilateral hips unremarkable with palpation.  No leg-length discrepancies noted on exam.  Full range of motion all 4 extremities within normal limits.  CN II-XII intact bilaterally, PERRLA, EOMI, AO, no facial asymmetry noted, strength 5/5 x 4 extremities, sensation intact throughout, no focal neurological deficits noted on exam.  Gait not assessed due to high risk for falls.       Skin: Skin is warm. No rash noted. No erythema.   Psychiatric: She has a normal mood and affect. Her behavior is normal.         ED Course   Procedures  Labs Reviewed   BASIC METABOLIC PANEL - Abnormal; Notable for the following components:       Result Value    Blood Urea Nitrogen 45.0 (*)     Creatinine 1.50 (*)     All other components within normal limits   URINALYSIS, REFLEX TO URINE CULTURE - Abnormal; Notable for the following components:    Leukocyte Esterase, UA Small (*)     All other components within normal limits   CBC WITH DIFFERENTIAL - Abnormal; Notable for the following components:    RBC 3.80 (*)     Hgb 11.9 (*)     .8 (*)     MCH 31.3 (*)     MCHC 31.1 (*)     All other components within normal limits   URINALYSIS, MICROSCOPIC - Abnormal; Notable for the following components:    WBC, UA 6-10 (*)     All other components within normal limits   LACTIC ACID, PLASMA - Normal   CK - Normal   CBC W/ AUTO DIFFERENTIAL    Narrative:     The following orders were created for panel order CBC Auto Differential.  Procedure                               Abnormality         Status                     ---------                               -----------         ------                     CBC with Differential[[id number]]       Abnormal            Final result                 Please view results for these  tests on the individual orders.          Imaging Results              CT Cervical Spine Without Contrast (Final result)  Result time 02/18/24 13:23:51      Final result by Herbert Gaitan MD (02/18/24 13:23:51)                   Impression:      Loss of the normal lordotic curve of the cervical spine most likely related to spasm but otherwise unremarkable with no evidence of acute fracture or dislocation seen    Incidental note is made of some degenerative changes in the  cervical spine      Electronically signed by: Mendoza Gaitan  Date:    02/18/2024  Time:    13:23               Narrative:    EXAMINATION:  CT CERVICAL SPINE WITHOUT CONTRAST    CLINICAL HISTORY:  fall;    TECHNIQUE:  Low dose axial images, sagittal and coronal reformations were performed though the cervical spine.  Contrast was not administered. Automatic exposure control is utilized to reduce patient radiation exposure.    COMPARISON:  01/02/2020    FINDINGS:  The vertebral body heights are well maintained. There is some loss of the normal lordotic curve cervical spine most likely related to spasm. No fracture is seen. No dislocation is seen. The odontoid and lateral masses appear grossly unremarkable.  There are some degenerative changes seen in the cervical spine which appear to be chronic.                                       CT Head Without Contrast (Final result)  Result time 02/18/24 13:30:00      Final result by Christopher Barraza MD (02/18/24 13:30:00)                   Impression:      No acute intracranial abnormality.      Electronically signed by: Christopher Barraza MD  Date:    02/18/2024  Time:    13:30               Narrative:    EXAMINATION:  CT HEAD WITHOUT CONTRAST    CLINICAL HISTORY:  fall;    TECHNIQUE:  Axial images of the head were obtained without IV contrast administration.  Coronal and sagittal reconstructions were provided.  Three dimensional and MIP images were obtained and evaluated.  Total DLP was 1105.1 mGy-cm. Dose  lowering technique and automated exposure control were utilized for this exam.    COMPARISON:  MRI of the brain 01/02/2020.    FINDINGS:  There is normal brain formation.  There is normal gray-white matter differentiation.  There is no hemorrhage, hydrocephalus, or midline shift.  There is no cytotoxic or vasogenic edema.  There is no intra or extra-axial fluid collection.  There is no herniation.    The calvarium is intact.  There is no fracture.  The bilateral orbits are normal.  The paranasal sinuses and mastoid air cells are normally developed and free of disease.                                       Medications   Tdap (BOOSTRIX) vaccine injection 0.5 mL (0.5 mLs Intramuscular Given 2/18/24 1242)     Medical Decision Making  Differentials:  Fracture, contusion, intracranial bleed, hemorrhagic bleed   Historian is the patient and her granddaughter   Overall well-appearing 89-year-old female GCS of 15 with a small amount of ecchymosis to her right lower chin presents after a fall.  Basic labs showed no significant dehydration and creatinine seems to be at baseline.  UA shows no signs of infection.  CT head and C-spine showed no acute findings.  I discussed the results with the patient and her granddaughter and they feel reassured.  I discussed at length with them the importance of fall precautions going forward and they agreed.  All questions answered in layman's terms and return precautions were discussed.    Amount and/or Complexity of Data Reviewed  Labs: ordered. Decision-making details documented in ED Course.  Radiology: ordered. Decision-making details documented in ED Course.    Risk  Prescription drug management.                                      Clinical Impression:  Final diagnoses:  [W19.XXXA] Fall, initial encounter (Primary)  [S00.83XA] Contusion of chin, initial encounter          ED Disposition Condition    Discharge Stable          ED Prescriptions    None       Follow-up Information        Follow up With Specialties Details Why Contact Info    [facility] - Emergency Dept Emergency Medicine  If symptoms worsen [address] [address] [phone]    Saman Kearns II, MD Internal Medicine Schedule an appointment as soon as possible for a visit   [address] [id number] [phone]               Terry Duvall MD  02/18/24 1412

## 2024-02-29 ENCOUNTER — LAB REQUISITION (OUTPATIENT)
Dept: LAB | Facility: HOSPITAL | Age: 89
End: 2024-02-29
Payer: MEDICARE

## 2024-02-29 DIAGNOSIS — R79.89 OTHER SPECIFIED ABNORMAL FINDINGS OF BLOOD CHEMISTRY: ICD-10-CM

## 2024-02-29 DIAGNOSIS — I48.0 PAROXYSMAL ATRIAL FIBRILLATION: ICD-10-CM

## 2024-02-29 DIAGNOSIS — E05.90 THYROTOXICOSIS, UNSPECIFIED WITHOUT THYROTOXIC CRISIS OR STORM: ICD-10-CM

## 2024-02-29 DIAGNOSIS — Z00.00 ENCOUNTER FOR GENERAL ADULT MEDICAL EXAMINATION WITHOUT ABNORMAL FINDINGS: ICD-10-CM

## 2024-02-29 LAB
ALBUMIN SERPL-MCNC: 3.6 G/DL (ref 3.4–4.8)
ALBUMIN/GLOB SERPL: 1 RATIO (ref 1.1–2)
ALP SERPL-CCNC: 81 UNIT/L (ref 40–150)
ALT SERPL-CCNC: 12 UNIT/L (ref 0–55)
APPEARANCE UR: CLEAR
AST SERPL-CCNC: 20 UNIT/L (ref 5–34)
BACTERIA #/AREA URNS AUTO: ABNORMAL /HPF
BASOPHILS # BLD AUTO: 0.03 X10(3)/MCL
BASOPHILS NFR BLD AUTO: 0.3 %
BILIRUB SERPL-MCNC: 0.7 MG/DL
BILIRUB UR QL STRIP.AUTO: NEGATIVE
BUN SERPL-MCNC: 48.7 MG/DL (ref 9.8–20.1)
CALCIUM SERPL-MCNC: 10.2 MG/DL (ref 8.4–10.2)
CHLORIDE SERPL-SCNC: 107 MMOL/L (ref 98–111)
CHOLEST SERPL-MCNC: 114 MG/DL
CHOLEST/HDLC SERPL: 2 {RATIO} (ref 0–5)
CO2 SERPL-SCNC: 23 MMOL/L (ref 23–31)
COLOR UR AUTO: YELLOW
CREAT SERPL-MCNC: 1.73 MG/DL (ref 0.55–1.02)
EOSINOPHIL # BLD AUTO: 0.15 X10(3)/MCL (ref 0–0.9)
EOSINOPHIL NFR BLD AUTO: 1.7 %
ERYTHROCYTE [DISTWIDTH] IN BLOOD BY AUTOMATED COUNT: 16.9 % (ref 11.5–17)
GFR SERPLBLD CREATININE-BSD FMLA CKD-EPI: 28 MLS/MIN/1.73/M2
GLOBULIN SER-MCNC: 3.5 GM/DL (ref 2.4–3.5)
GLUCOSE SERPL-MCNC: 126 MG/DL (ref 75–121)
GLUCOSE UR QL STRIP.AUTO: NEGATIVE
HCT VFR BLD AUTO: 37.1 % (ref 37–47)
HDLC SERPL-MCNC: 47 MG/DL (ref 35–60)
HGB BLD-MCNC: 11.6 G/DL (ref 12–16)
IMM GRANULOCYTES # BLD AUTO: 0.03 X10(3)/MCL (ref 0–0.04)
IMM GRANULOCYTES NFR BLD AUTO: 0.3 %
KETONES UR QL STRIP.AUTO: NEGATIVE
LDLC SERPL CALC-MCNC: 37 MG/DL (ref 50–140)
LEUKOCYTE ESTERASE UR QL STRIP.AUTO: ABNORMAL
LYMPHOCYTES # BLD AUTO: 2.17 X10(3)/MCL (ref 0.6–4.6)
LYMPHOCYTES NFR BLD AUTO: 24.7 %
MCH RBC QN AUTO: 32.1 PG (ref 27–31)
MCHC RBC AUTO-ENTMCNC: 31.3 G/DL (ref 33–36)
MCV RBC AUTO: 102.8 FL (ref 80–94)
MONOCYTES # BLD AUTO: 0.68 X10(3)/MCL (ref 0.1–1.3)
MONOCYTES NFR BLD AUTO: 7.8 %
NEUTROPHILS # BLD AUTO: 5.71 X10(3)/MCL (ref 2.1–9.2)
NEUTROPHILS NFR BLD AUTO: 65.2 %
NITRITE UR QL STRIP.AUTO: NEGATIVE
PH UR STRIP.AUTO: 5 [PH]
PLATELET # BLD AUTO: 206 X10(3)/MCL (ref 130–400)
PMV BLD AUTO: 10.6 FL (ref 7.4–10.4)
POTASSIUM SERPL-SCNC: 4.8 MMOL/L (ref 3.5–5.1)
PROT SERPL-MCNC: 7.1 GM/DL (ref 5.8–7.6)
PROT UR QL STRIP.AUTO: NEGATIVE
RBC # BLD AUTO: 3.61 X10(6)/MCL (ref 4.2–5.4)
RBC #/AREA URNS AUTO: ABNORMAL /HPF
RBC UR QL AUTO: ABNORMAL
SODIUM SERPL-SCNC: 143 MMOL/L (ref 132–146)
SP GR UR STRIP.AUTO: 1.01 (ref 1–1.03)
SQUAMOUS #/AREA URNS AUTO: ABNORMAL /HPF
T4 FREE SERPL-MCNC: 0.8 NG/DL (ref 0.7–1.48)
T4 SERPL-MCNC: 4.86 UG/DL (ref 4.87–11.72)
TRIGL SERPL-MCNC: 150 MG/DL (ref 37–140)
TSH SERPL-ACNC: 1.03 UIU/ML (ref 0.35–4.94)
UROBILINOGEN UR STRIP-ACNC: 0.2
VLDLC SERPL CALC-MCNC: 30 MG/DL
WBC # SPEC AUTO: 8.77 X10(3)/MCL (ref 4.5–11.5)
WBC #/AREA URNS AUTO: ABNORMAL /HPF

## 2024-02-29 PROCEDURE — 80053 COMPREHEN METABOLIC PANEL: CPT | Performed by: INTERNAL MEDICINE

## 2024-02-29 PROCEDURE — 84443 ASSAY THYROID STIM HORMONE: CPT | Performed by: INTERNAL MEDICINE

## 2024-02-29 PROCEDURE — 87086 URINE CULTURE/COLONY COUNT: CPT | Performed by: INTERNAL MEDICINE

## 2024-02-29 PROCEDURE — 84439 ASSAY OF FREE THYROXINE: CPT | Performed by: INTERNAL MEDICINE

## 2024-02-29 PROCEDURE — 85025 COMPLETE CBC W/AUTO DIFF WBC: CPT | Performed by: INTERNAL MEDICINE

## 2024-02-29 PROCEDURE — 81003 URINALYSIS AUTO W/O SCOPE: CPT | Performed by: INTERNAL MEDICINE

## 2024-02-29 PROCEDURE — 80061 LIPID PANEL: CPT | Performed by: INTERNAL MEDICINE

## 2024-02-29 PROCEDURE — 84436 ASSAY OF TOTAL THYROXINE: CPT | Performed by: INTERNAL MEDICINE

## 2024-03-04 LAB
BACTERIA UR CULT: ABNORMAL
BACTERIA UR CULT: ABNORMAL

## 2024-03-05 NOTE — PROGRESS NOTES
Subjective:       Patient ID: Baudilio Mantilla is a 89 y.o. female.      Patient Care Team:  Saman Kearns II, MD as PCP - General (Internal Medicine)    Chief Complaint: Medicare AW Follow Up, Atrial Fibrillation, Dyslipidemia, Hypertension, Congestive Heart Failure, Chronic Kidney Disease, Hyperthyroidism, Impaired Fasting Glucose, and Vitamin D Deficiency    89-year-old white female here for follow-up of atrial fibrillation, hyperthyroidism, hypertension, and gout among other conditions.  She has had fluid retention and lower extremity edema over the past few days.  She missed a dose of her fluid pills yesterday      Review of Systems   Constitutional:  Positive for fatigue. Negative for fever.   HENT:  Negative for nosebleeds.    Eyes:  Negative for visual disturbance.   Respiratory:  Negative for shortness of breath.    Cardiovascular:  Positive for leg swelling. Negative for chest pain.   Gastrointestinal:  Negative for abdominal pain.   Genitourinary:  Negative for dysuria.   Musculoskeletal:  Negative for gait problem.   Neurological:  Positive for weakness. Negative for headaches.           Patient Reported Health Risk Assessment  What is your age?: 80 or older  Are you male or female?: Female  During the past four weeks, how much have you been bothered by emotional problems such as feeling anxious, depressed, irritable, sad, or downhearted and blue?: Not at all  During the past five weeks, has your physical and/or emotional health limited your social activities with family, friends, neighbors, or groups?: Not at all  During the past four weeks, how much bodily pain have you generally had?: Moderate pain  During the past four weeks, was someone available to help if you needed and wanted help?: Yes, as much as I wanted  During the past four weeks, what was the hardest physical activity you could do for at least two minutes?: Very light  Can you get to places out of walking distance without help?  (For  example, can you travel alone on buses or taxis, or drive your own car?): No  Can you go shopping for groceries or clothes without someone's help?: No  Can you prepare your own meals?: Yes  Can you do your own housework without help?: Yes  Because of any health problems, do you need the help of another person with your personal care needs such as eating, bathing, dressing, or getting around the house?: No  Can you handle your own money without help?: No  During the past four weeks, how would you rate your health in general?: Good  How have things been going for you during the past four weeks?: Very well  Are you having difficulties driving your car?: No  Do you always fasten your seat belt when you are in a car?: Yes, usually  How often in the past four weeks have you been bothered by falling or dizzy when standing up?: Sometimes  How often in the past four weeks have you been bothered by sexual problems?: Never  How often in the past four weeks have you been bothered by trouble eating well?: Never  How often in the past four weeks have you been bothered by teeth or denture problems?: Never  How often in the past four weeks have you been bothered with problems using the telephone?: Never  How often in the past four weeks have you been bothered by tiredness or fatigue?: Often  Have you fallen two or more times in the past year?: No  Are you afraid of falling?: No  Are you a smoker?: No  During the past four weeks, how many drinks of wine, beer, or other alcoholic beverages did you have?: No alcohol at all  Do you exercise for about 20 minutes three or more days a week?: No, I usually do not exercise this much  Have you been given any information to help you with hazards in your house that might hurt you?: No  Have you been given any information to help you with keeping track of your medications?: No  How often do you have trouble taking medicines the way you've been told to take them?: I always take them as  "prescribed  How confident are you that you can control and manage most of your health problems?: Somewhat confident  What is your race? (Check all that apply.):       Objective:      Physical Exam  HENT:      Head: Normocephalic.      Mouth/Throat:      Pharynx: Oropharynx is clear.   Eyes:      Extraocular Movements: Extraocular movements intact.   Cardiovascular:      Rate and Rhythm: Normal rate.   Pulmonary:      Effort: No respiratory distress.      Breath sounds: Normal breath sounds. No wheezing.   Musculoskeletal:      Right lower leg: Edema present.      Left lower leg: Edema present.   Skin:     General: Skin is warm.   Neurological:      Mental Status: She is alert and oriented to person, place, and time.      Gait: Gait abnormal.   Psychiatric:         Mood and Affect: Mood normal.         Vitals:    03/06/24 1302   BP: 110/62   Pulse: 68   Resp: 16   Temp: 98.2 °F (36.8 °C)   SpO2: (!) 94%   Weight: 69.9 kg (154 lb)   Height: 5' 3" (1.6 m)                 No data to display                  3/6/2024     1:00 PM 12/19/2023     2:00 PM 11/27/2023     2:00 PM 12/13/2022     2:30 PM   Fall Risk Assessment - Outpatient   Mobility Status Ambulatory w/ assistance Ambulatory w/ assistance Ambulatory w/ assistance Ambulatory   Number of falls 1 1 1 0   Identified as fall risk True True True False           Depression Screening  Over the past two weeks, has the patient felt down, depressed, or hopeless?: No  Over the past two weeks, has the patient felt little interest or pleasure in doing things?: No  Functional Ability/Safety Screening  Was the patient's timed Up & Go test unsteady or longer than 30 seconds?: No  Does the patient need help with phone, transportation, shopping, preparing meals, housework, laundry, meds, or managing money?: No  Does the patient's home have rugs in the hallway, lack grab bars in the bathroom, lack handrails on the stairs or have poor lighting?: No  Have you noticed any " hearing difficulties?: No  Cognitive Function (Assessed through direct observation with due consideration of information obtained by way of patient reports and/or concerns raised by family, friends, caretakers, or others)    Does the patient repeat questions/statements in the same day?: No  Does the patient have trouble remembering the date, year, and time?: No  Does the patient have difficulty managing finances?: No  Does the patient have a decreased sense of direction?: No      Assessment:       Problem List Items Addressed This Visit          Cardiac/Vascular    Primary hypertension - Primary    Relevant Orders    CBC Auto Differential    Comprehensive Metabolic Panel    Lipid Panel    Urinalysis, Reflex to Urine Culture    T4, Free    TSH    Hemoglobin A1C    Dyslipidemia    Relevant Orders    CBC Auto Differential    Comprehensive Metabolic Panel    Lipid Panel    Urinalysis, Reflex to Urine Culture    T4, Free    TSH    Hemoglobin A1C    Paroxysmal atrial fibrillation    Relevant Orders    CBC Auto Differential    Comprehensive Metabolic Panel    Lipid Panel    Urinalysis, Reflex to Urine Culture    T4, Free    TSH    Hemoglobin A1C    Chronic systolic heart failure    Relevant Orders    CBC Auto Differential    Comprehensive Metabolic Panel    Lipid Panel    Urinalysis, Reflex to Urine Culture    T4, Free    TSH    Hemoglobin A1C       Renal/    Chronic kidney disease (CKD), stage IV (severe)    Relevant Orders    CBC Auto Differential    Comprehensive Metabolic Panel    Lipid Panel    Urinalysis, Reflex to Urine Culture    T4, Free    TSH    Hemoglobin A1C       Endocrine    Impaired fasting glucose    Relevant Orders    CBC Auto Differential    Comprehensive Metabolic Panel    Lipid Panel    Urinalysis, Reflex to Urine Culture    T4, Free    TSH    Hemoglobin A1C    Hyperthyroidism    Relevant Orders    CBC Auto Differential    Comprehensive Metabolic Panel    Lipid Panel    Urinalysis, Reflex to Urine  Culture    T4, Free    TSH    Hemoglobin A1C       Other    Low vitamin D level    Relevant Orders    CBC Auto Differential    Comprehensive Metabolic Panel    Lipid Panel    Urinalysis, Reflex to Urine Culture    T4, Free    TSH    Hemoglobin A1C    RESOLVED: Wellness examination    Relevant Orders    CBC Auto Differential    Comprehensive Metabolic Panel    Lipid Panel    Urinalysis, Reflex to Urine Culture    T4, Free    TSH    Hemoglobin A1C       Medication List with Changes/Refills   Current Medications    ALLOPURINOL (ZYLOPRIM) 100 MG TABLET    Take 1 tablet (100 mg total) by mouth once daily.    BUMETANIDE (BUMEX) 1 MG TABLET    TAKE TWO TABLETS BY MOUTH IN THE MORNING AND 1 TAB IN THE AFTERNOON    COLCHICINE (COLCRYS) 0.6 MG TABLET    Take 2 tablets now then 1 tablet daily for 6 days    DICLOFENAC SODIUM (VOLTAREN) 1 % GEL    SMARTSI Gram(s) Topical 4 Times Daily PRN    ELIQUIS 5 MG TAB    Take 1 tablet (5 mg total) by mouth 2 (two) times daily.    FERROUS SULFATE (FEOSOL ORAL)    Take 325 mg by mouth every Mon, Wed, Fri.    MECLIZINE (ANTIVERT) 12.5 MG TABLET    Take 1 tablet (12.5 mg total) by mouth 3 (three) times daily as needed for Dizziness.    METHIMAZOLE (TAPAZOLE) 5 MG TAB    0.5 tablet po daily.    PANTOPRAZOLE (PROTONIX) 40 MG TABLET    TAKE ONE TABLET BY MOUTH TWICE DAILY    SERTRALINE (ZOLOFT) 50 MG TABLET    Take 1 tablet (50 mg total) by mouth once daily.    SPIRONOLACTONE (ALDACTONE) 25 MG TABLET    Take 1 tablet (25 mg total) by mouth once daily.    TRIAMCINOLONE ACETONIDE 0.1% (KENALOG) 0.1 % OINTMENT    Apply topically.        Plan:       1. Atrial fibrillation: Diagnosed in , had digoxin toxicity in  and was in the ICU with heart rate in the 20s and 30s. At that time, she was on digoxin, amiodarone, and Cardizem.  Pacemaker placed in , no longer on metoprolol. Continue Eliquis     2. Hyperthyroidism: Methimazole was discontinued when she was in the hospital in .  TSH  has been low, and methimazole was reinitiated while hospitalized in 3/2022. TSH normal this time    3. Hypertension: Stable     4. Gout:  Restarted allopurinol at last visit    5. Depression: Continue Zoloft 50 mg daily    6. Gastroesophageal reflux disease: Continue pantoprazole     7.  Impaired fasting glucose: Her last hemoglobin A1c was 5.5    8. Insomnia: Trazodone, Restoril, and mirtazapine made her too groggy.  She doesn't want more medicine    9. Osteoarthritis: Tylenol as needed.  She has ongoing right shoulder pain    10. Congestive heart failure: Her last ejection fraction was 20-25%. Continue bumetanide (2 in AM, 1 at lunch) and spironolactone.  Because of lower extremity edema, take 2 bumetanide this afternoon.  She missed a dose of her medication yesterday.  We discussed monitoring her weight daily and avoiding salt    11. Recent urinary tract infection: Treated with Cipro while hospitalized in 2020    12. Iron deficiency anemia: She was given IV iron in 2020. Continue iron supplementation three times per week    13. Goiter: CT lumbar spine showed a large goiter. She does not want to see endocrinology. She denies dysphagia    14. Neck swelling: Evaluated by ENT, benign etiology; goiter as above    15.  Chronic kidney disease stage 4: Creatinine 1.73.      16. Wellness: Doesn't want MMGs. Pneumovax 2021              Medicare Annual Wellness and Personalized Prevention Plan:   Fall Risk + Home Safety + Hearing Impairment + Depression Screen + Cognitive Impairment Screen + Health Risk Assessment all reviewed    Health Maintenance Topics with due status: Not Due       Topic Last Completion Date    TETANUS VACCINE 02/18/2024    Lipid Panel 02/29/2024      The patient's Health Maintenance was reviewed and the following appears to be due at this time:   Health Maintenance Due   Topic Date Due    Shingles Vaccine (1 of 2) Never done    RSV Vaccine (Age 60+ and Pregnant patients) (1 - 1-dose 60+ series) Never  done    Pneumococcal Vaccines (Age 65+) (2 of 2 - PCV) 11/12/2022    COVID-19 Vaccine (6 - 2023-24 season) 09/01/2023    Hemoglobin A1c (Prediabetes)  12/09/2023       Advance Care Planning   I attest to discussing Advance Care Planning with patient and/or family member.  Education was provided including the importance of the Health Care Power of , Advance Directives, and/or [place] documentation.  The patient expressed understanding to the importance of this information and discussion.  Length of ACP conversation in minutes: 0       Opioid Screening: Patient medication list reviewed, patient is not taking prescription opioids. Patient is not using additional opioids than prescribed. Patient is at low risk of substance abuse based on this opioid use history.     No follow-ups on file. In addition to their scheduled follow up, the patient has also been instructed to follow up on as needed basis.

## 2024-03-06 ENCOUNTER — OFFICE VISIT (OUTPATIENT)
Dept: INTERNAL MEDICINE | Facility: CLINIC | Age: 89
End: 2024-03-06
Payer: MEDICARE

## 2024-03-06 VITALS
OXYGEN SATURATION: 94 % | DIASTOLIC BLOOD PRESSURE: 62 MMHG | RESPIRATION RATE: 16 BRPM | BODY MASS INDEX: 27.29 KG/M2 | SYSTOLIC BLOOD PRESSURE: 110 MMHG | TEMPERATURE: 98 F | WEIGHT: 154 LBS | HEIGHT: 63 IN | HEART RATE: 68 BPM

## 2024-03-06 DIAGNOSIS — I50.22 CHRONIC SYSTOLIC HEART FAILURE: ICD-10-CM

## 2024-03-06 DIAGNOSIS — E05.90 HYPERTHYROIDISM: ICD-10-CM

## 2024-03-06 DIAGNOSIS — E78.5 DYSLIPIDEMIA: ICD-10-CM

## 2024-03-06 DIAGNOSIS — R79.89 LOW VITAMIN D LEVEL: ICD-10-CM

## 2024-03-06 DIAGNOSIS — R73.01 IMPAIRED FASTING GLUCOSE: ICD-10-CM

## 2024-03-06 DIAGNOSIS — I48.0 PAROXYSMAL ATRIAL FIBRILLATION: ICD-10-CM

## 2024-03-06 DIAGNOSIS — I10 PRIMARY HYPERTENSION: Primary | ICD-10-CM

## 2024-03-06 DIAGNOSIS — N18.4 CHRONIC KIDNEY DISEASE (CKD), STAGE IV (SEVERE): ICD-10-CM

## 2024-03-06 DIAGNOSIS — Z00.00 WELLNESS EXAMINATION: ICD-10-CM

## 2024-03-06 PROCEDURE — G0439 PPPS, SUBSEQ VISIT: HCPCS | Mod: ,,, | Performed by: INTERNAL MEDICINE

## 2024-03-06 PROCEDURE — 1101F PT FALLS ASSESS-DOCD LE1/YR: CPT | Mod: CPTII,,, | Performed by: INTERNAL MEDICINE

## 2024-03-06 PROCEDURE — 1160F RVW MEDS BY RX/DR IN RCRD: CPT | Mod: CPTII,,, | Performed by: INTERNAL MEDICINE

## 2024-03-06 PROCEDURE — 1159F MED LIST DOCD IN RCRD: CPT | Mod: CPTII,,, | Performed by: INTERNAL MEDICINE

## 2024-03-06 PROCEDURE — 3288F FALL RISK ASSESSMENT DOCD: CPT | Mod: CPTII,,, | Performed by: INTERNAL MEDICINE

## 2024-03-08 DIAGNOSIS — I10 HYPERTENSION, UNSPECIFIED TYPE: ICD-10-CM

## 2024-03-08 RX ORDER — TRAMADOL HYDROCHLORIDE 50 MG/1
50 TABLET ORAL 2 TIMES DAILY PRN
Qty: 30 TABLET | Refills: 3 | Status: SHIPPED | OUTPATIENT
Start: 2024-03-08

## 2024-03-08 RX ORDER — BUMETANIDE 1 MG/1
2 TABLET ORAL 2 TIMES DAILY
Qty: 120 TABLET | Refills: 1 | Status: SHIPPED | OUTPATIENT
Start: 2024-03-08 | End: 2024-05-20 | Stop reason: SDUPTHER

## 2024-03-08 RX ORDER — TRAMADOL HYDROCHLORIDE 50 MG/1
50 TABLET ORAL 2 TIMES DAILY PRN
Qty: 30 TABLET | Refills: 2 | Status: CANCELLED | OUTPATIENT
Start: 2024-03-08

## 2024-03-08 NOTE — TELEPHONE ENCOUNTER
----- Message from Iza Alamo sent at 3/8/2024  7:56 AM CST -----  1. Patient still having bilateral leg swelling. Family is wondering if we can adjust fluid pills, possibly over the weekend.   2. Also complaining of severe knee pain. Tylenol not helping much.  Please advise.

## 2024-03-08 NOTE — TELEPHONE ENCOUNTER
Increase bumetanide to 2 tablets BID for awhile until it gets better    Can add Tramadol BID PRN and keep taking Tylenol

## 2024-04-04 ENCOUNTER — TELEPHONE (OUTPATIENT)
Dept: INTERNAL MEDICINE | Facility: CLINIC | Age: 89
End: 2024-04-04
Payer: MEDICARE

## 2024-04-04 DIAGNOSIS — L89.311 PRESSURE INJURY OF RIGHT BUTTOCK, STAGE 1: ICD-10-CM

## 2024-04-04 DIAGNOSIS — Z00.00 WELLNESS EXAMINATION: Primary | ICD-10-CM

## 2024-04-04 RX ORDER — SULFAMETHOXAZOLE AND TRIMETHOPRIM 800; 160 MG/1; MG/1
1 TABLET ORAL 2 TIMES DAILY
COMMUNITY
Start: 2024-03-29 | End: 2024-04-04 | Stop reason: SDUPTHER

## 2024-04-04 RX ORDER — SULFAMETHOXAZOLE AND TRIMETHOPRIM 800; 160 MG/1; MG/1
1 TABLET ORAL 2 TIMES DAILY
Qty: 6 TABLET | Refills: 0 | Status: SHIPPED | OUTPATIENT
Start: 2024-04-04 | End: 2024-04-07

## 2024-04-04 NOTE — TELEPHONE ENCOUNTER
Spoke with nurse. finishing antibiotics today. Still painful, swollen, purulent discharge and has an odor today. The redness is much improved from last week, however. Nurse will send a picture. Wound care?

## 2024-04-04 NOTE — TELEPHONE ENCOUNTER
----- Message from Virgilio Paulino sent at 4/4/2024 11:07 AM CDT -----  .Type:  Needs Medical Advice    Who Called: [facility]  Symptoms (please be specific): boil/spider bite?  How long has patient had these symptoms:    Pharmacy name and phone #:    Would the patient rather a call back or a response via [facility]? Call back  Best Call Back Number: [phone]  Additional Information: called wanting to speak with nurse

## 2024-04-04 NOTE — TELEPHONE ENCOUNTER
Send to wound care because it could be related to pressure ulcer, extend antibiotic for 3 more days

## 2024-04-08 ENCOUNTER — TELEPHONE (OUTPATIENT)
Dept: INTERNAL MEDICINE | Facility: CLINIC | Age: 89
End: 2024-04-08
Payer: MEDICARE

## 2024-04-08 NOTE — TELEPHONE ENCOUNTER
----- Message from Dina Dumont sent at 4/8/2024  3:36 PM CDT -----  Regarding: call back  .Type:  Needs Medical Advice    Who Called: Allison haas/[facility]  Symptoms (please be specific): wound on back side   How long has patient had these symptoms:    Pharmacy name and phone #:    Would the patient rather a call back or a response via [facility]? Call back   Best Call Back Number: [phone]  Additional Information: requesting a call back from Lia

## 2024-05-20 DIAGNOSIS — I10 HYPERTENSION, UNSPECIFIED TYPE: ICD-10-CM

## 2024-05-20 DIAGNOSIS — Z00.00 WELLNESS EXAMINATION: ICD-10-CM

## 2024-05-20 DIAGNOSIS — E05.90 HYPERTHYROIDISM: ICD-10-CM

## 2024-05-20 RX ORDER — METHIMAZOLE 5 MG/1
TABLET ORAL
Qty: 15 TABLET | Refills: 11 | Status: SHIPPED | OUTPATIENT
Start: 2024-05-20

## 2024-05-20 RX ORDER — PANTOPRAZOLE SODIUM 40 MG/1
40 TABLET, DELAYED RELEASE ORAL 2 TIMES DAILY
Qty: 60 TABLET | Refills: 11 | Status: SHIPPED | OUTPATIENT
Start: 2024-05-20

## 2024-05-20 RX ORDER — SPIRONOLACTONE 25 MG/1
25 TABLET ORAL DAILY
Qty: 30 TABLET | Refills: 11 | Status: SHIPPED | OUTPATIENT
Start: 2024-05-20

## 2024-05-20 RX ORDER — SERTRALINE HYDROCHLORIDE 50 MG/1
50 TABLET, FILM COATED ORAL DAILY
Qty: 30 TABLET | Refills: 11 | Status: SHIPPED | OUTPATIENT
Start: 2024-05-20

## 2024-05-20 RX ORDER — BUMETANIDE 1 MG/1
TABLET ORAL
Qty: 90 TABLET | Refills: 11 | Status: SHIPPED | OUTPATIENT
Start: 2024-05-20

## 2024-05-20 RX ORDER — TRIAMCINOLONE ACETONIDE 1 MG/G
OINTMENT TOPICAL 2 TIMES DAILY
Qty: 453.6 G | Refills: 1 | Status: SHIPPED | OUTPATIENT
Start: 2024-05-20 | End: 2025-05-20

## 2024-05-28 ENCOUNTER — TELEPHONE (OUTPATIENT)
Dept: INTERNAL MEDICINE | Facility: CLINIC | Age: 89
End: 2024-05-28
Payer: MEDICARE

## 2024-06-13 ENCOUNTER — TELEPHONE (OUTPATIENT)
Dept: INTERNAL MEDICINE | Facility: CLINIC | Age: 89
End: 2024-06-13
Payer: MEDICARE

## 2024-06-13 DIAGNOSIS — R29.898 BILATERAL LEG WEAKNESS: ICD-10-CM

## 2024-06-13 DIAGNOSIS — R29.6 RECURRENT FALLS WHILE WALKING: Primary | ICD-10-CM

## 2024-06-13 DIAGNOSIS — G89.29 CHRONIC RIGHT SHOULDER PAIN: ICD-10-CM

## 2024-06-13 DIAGNOSIS — M25.511 CHRONIC RIGHT SHOULDER PAIN: ICD-10-CM

## 2024-07-16 DIAGNOSIS — N18.30 STAGE 3 CHRONIC KIDNEY DISEASE, UNSPECIFIED WHETHER STAGE 3A OR 3B CKD: ICD-10-CM

## 2024-07-16 RX ORDER — MECLIZINE HCL 12.5 MG 12.5 MG/1
12.5 TABLET ORAL 3 TIMES DAILY PRN
Qty: 30 TABLET | Refills: 5 | Status: SHIPPED | OUTPATIENT
Start: 2024-07-16

## 2024-08-12 DIAGNOSIS — B37.2 SKIN YEAST INFECTION: Primary | ICD-10-CM

## 2024-08-12 RX ORDER — NYSTATIN 100000 U/G
CREAM TOPICAL 2 TIMES DAILY
Qty: 30 G | Refills: 2 | Status: SHIPPED | OUTPATIENT
Start: 2024-08-12 | End: 2024-08-19

## 2024-09-03 ENCOUNTER — HOSPITAL ENCOUNTER (INPATIENT)
Facility: HOSPITAL | Age: 89
LOS: 7 days | Discharge: NURSING, REHAB OR LONG-TERM CARE | DRG: 291 | End: 2024-09-10
Attending: STUDENT IN AN ORGANIZED HEALTH CARE EDUCATION/TRAINING PROGRAM | Admitting: INTERNAL MEDICINE
Payer: MEDICARE

## 2024-09-03 DIAGNOSIS — M25.519 SHOULDER PAIN: ICD-10-CM

## 2024-09-03 DIAGNOSIS — R79.89 LOW VITAMIN D LEVEL: ICD-10-CM

## 2024-09-03 DIAGNOSIS — N18.4 CHRONIC KIDNEY DISEASE (CKD), STAGE IV (SEVERE): ICD-10-CM

## 2024-09-03 DIAGNOSIS — N17.9 AKI (ACUTE KIDNEY INJURY): ICD-10-CM

## 2024-09-03 DIAGNOSIS — I50.9 CHF (CONGESTIVE HEART FAILURE): ICD-10-CM

## 2024-09-03 DIAGNOSIS — E87.70 VOLUME OVERLOAD: Primary | ICD-10-CM

## 2024-09-03 DIAGNOSIS — R06.02 SOB (SHORTNESS OF BREATH): ICD-10-CM

## 2024-09-03 LAB
ALBUMIN SERPL-MCNC: 3.1 G/DL (ref 3.4–4.8)
ALBUMIN/GLOB SERPL: 0.7 RATIO (ref 1.1–2)
ALP SERPL-CCNC: 87 UNIT/L (ref 40–150)
ALT SERPL-CCNC: 7 UNIT/L (ref 0–55)
ANION GAP SERPL CALC-SCNC: 14 MEQ/L
AST SERPL-CCNC: 15 UNIT/L (ref 5–34)
BACTERIA #/AREA URNS AUTO: ABNORMAL /HPF
BASOPHILS # BLD AUTO: 0.02 X10(3)/MCL
BASOPHILS NFR BLD AUTO: 0.2 %
BILIRUB SERPL-MCNC: 1.4 MG/DL
BILIRUB UR QL STRIP.AUTO: NEGATIVE
BNP BLD-MCNC: 730.3 PG/ML
BUN SERPL-MCNC: 78.3 MG/DL (ref 9.8–20.1)
CALCIUM SERPL-MCNC: 10.3 MG/DL (ref 8.4–10.2)
CHLORIDE SERPL-SCNC: 98 MMOL/L (ref 98–111)
CLARITY UR: CLEAR
CO2 SERPL-SCNC: 23 MMOL/L (ref 23–31)
COLOR UR AUTO: YELLOW
CREAT SERPL-MCNC: 2.02 MG/DL (ref 0.55–1.02)
CREAT/UREA NIT SERPL: 39
EOSINOPHIL # BLD AUTO: 0.02 X10(3)/MCL (ref 0–0.9)
EOSINOPHIL NFR BLD AUTO: 0.2 %
ERYTHROCYTE [DISTWIDTH] IN BLOOD BY AUTOMATED COUNT: 14.1 % (ref 11.5–17)
GFR SERPLBLD CREATININE-BSD FMLA CKD-EPI: 23 ML/MIN/1.73/M2
GLOBULIN SER-MCNC: 4.6 GM/DL (ref 2.4–3.5)
GLUCOSE SERPL-MCNC: 149 MG/DL (ref 75–121)
GLUCOSE UR QL STRIP: NORMAL
HCT VFR BLD AUTO: 39.9 % (ref 37–47)
HGB BLD-MCNC: 13.3 G/DL (ref 12–16)
HGB UR QL STRIP: NEGATIVE
HYALINE CASTS #/AREA URNS LPF: ABNORMAL /LPF
IMM GRANULOCYTES # BLD AUTO: 0.06 X10(3)/MCL (ref 0–0.04)
IMM GRANULOCYTES NFR BLD AUTO: 0.5 %
KETONES UR QL STRIP: NEGATIVE
LEUKOCYTE ESTERASE UR QL STRIP: 250
LYMPHOCYTES # BLD AUTO: 0.83 X10(3)/MCL (ref 0.6–4.6)
LYMPHOCYTES NFR BLD AUTO: 7 %
MAGNESIUM SERPL-MCNC: 2.1 MG/DL (ref 1.6–2.6)
MAGNESIUM SERPL-MCNC: 2.1 MG/DL (ref 1.6–2.6)
MCH RBC QN AUTO: 31.9 PG (ref 27–31)
MCHC RBC AUTO-ENTMCNC: 33.3 G/DL (ref 33–36)
MCV RBC AUTO: 95.7 FL (ref 80–94)
MONOCYTES # BLD AUTO: 0.9 X10(3)/MCL (ref 0.1–1.3)
MONOCYTES NFR BLD AUTO: 7.6 %
MUCOUS THREADS URNS QL MICRO: ABNORMAL /LPF
NEUTROPHILS # BLD AUTO: 10.08 X10(3)/MCL (ref 2.1–9.2)
NEUTROPHILS NFR BLD AUTO: 84.5 %
NITRITE UR QL STRIP: NEGATIVE
NRBC BLD AUTO-RTO: 0 %
OHS QRS DURATION: 168 MS
OHS QTC CALCULATION: 502 MS
PH UR STRIP: 5 [PH]
PLATELET # BLD AUTO: 214 X10(3)/MCL (ref 130–400)
PMV BLD AUTO: 11.3 FL (ref 7.4–10.4)
POTASSIUM SERPL-SCNC: 4.2 MMOL/L (ref 3.5–5.1)
PROT SERPL-MCNC: 7.7 GM/DL (ref 5.8–7.6)
PROT UR QL STRIP: NEGATIVE
RBC # BLD AUTO: 4.17 X10(6)/MCL (ref 4.2–5.4)
RBC #/AREA URNS AUTO: ABNORMAL /HPF
SODIUM SERPL-SCNC: 135 MMOL/L (ref 136–145)
SP GR UR STRIP.AUTO: 1.01 (ref 1–1.03)
SQUAMOUS #/AREA URNS LPF: ABNORMAL /HPF
TROPONIN I SERPL-MCNC: 0.25 NG/ML (ref 0–0.04)
UROBILINOGEN UR STRIP-ACNC: NORMAL
WBC # BLD AUTO: 11.91 X10(3)/MCL (ref 4.5–11.5)
WBC #/AREA URNS AUTO: ABNORMAL /HPF

## 2024-09-03 PROCEDURE — 87086 URINE CULTURE/COLONY COUNT: CPT | Performed by: NURSE PRACTITIONER

## 2024-09-03 PROCEDURE — 85025 COMPLETE CBC W/AUTO DIFF WBC: CPT | Performed by: NURSE PRACTITIONER

## 2024-09-03 PROCEDURE — 83735 ASSAY OF MAGNESIUM: CPT | Performed by: NURSE PRACTITIONER

## 2024-09-03 PROCEDURE — 93010 ELECTROCARDIOGRAM REPORT: CPT | Mod: ,,, | Performed by: INTERNAL MEDICINE

## 2024-09-03 PROCEDURE — 11000001 HC ACUTE MED/SURG PRIVATE ROOM

## 2024-09-03 PROCEDURE — 81001 URINALYSIS AUTO W/SCOPE: CPT | Performed by: NURSE PRACTITIONER

## 2024-09-03 PROCEDURE — 80053 COMPREHEN METABOLIC PANEL: CPT | Performed by: NURSE PRACTITIONER

## 2024-09-03 PROCEDURE — 83880 ASSAY OF NATRIURETIC PEPTIDE: CPT | Performed by: STUDENT IN AN ORGANIZED HEALTH CARE EDUCATION/TRAINING PROGRAM

## 2024-09-03 PROCEDURE — 84484 ASSAY OF TROPONIN QUANT: CPT | Performed by: STUDENT IN AN ORGANIZED HEALTH CARE EDUCATION/TRAINING PROGRAM

## 2024-09-03 PROCEDURE — 99285 EMERGENCY DEPT VISIT HI MDM: CPT | Mod: 25

## 2024-09-03 PROCEDURE — 96374 THER/PROPH/DIAG INJ IV PUSH: CPT

## 2024-09-03 PROCEDURE — 83735 ASSAY OF MAGNESIUM: CPT | Performed by: STUDENT IN AN ORGANIZED HEALTH CARE EDUCATION/TRAINING PROGRAM

## 2024-09-03 PROCEDURE — 25000003 PHARM REV CODE 250: Performed by: NURSE PRACTITIONER

## 2024-09-03 PROCEDURE — 63600175 PHARM REV CODE 636 W HCPCS: Performed by: STUDENT IN AN ORGANIZED HEALTH CARE EDUCATION/TRAINING PROGRAM

## 2024-09-03 PROCEDURE — 93005 ELECTROCARDIOGRAM TRACING: CPT

## 2024-09-03 RX ORDER — TALC
6 POWDER (GRAM) TOPICAL NIGHTLY PRN
Status: DISCONTINUED | OUTPATIENT
Start: 2024-09-03 | End: 2024-09-10 | Stop reason: HOSPADM

## 2024-09-03 RX ORDER — SODIUM CHLORIDE 0.9 % (FLUSH) 0.9 %
10 SYRINGE (ML) INJECTION
Status: DISCONTINUED | OUTPATIENT
Start: 2024-09-03 | End: 2024-09-10 | Stop reason: HOSPADM

## 2024-09-03 RX ORDER — ACETAMINOPHEN 500 MG
1000 TABLET ORAL
Status: COMPLETED | OUTPATIENT
Start: 2024-09-03 | End: 2024-09-03

## 2024-09-03 RX ORDER — FUROSEMIDE 10 MG/ML
40 INJECTION INTRAMUSCULAR; INTRAVENOUS
Status: COMPLETED | OUTPATIENT
Start: 2024-09-03 | End: 2024-09-03

## 2024-09-03 RX ORDER — SODIUM CHLORIDE 9 MG/ML
500 INJECTION, SOLUTION INTRAVENOUS
Status: COMPLETED | OUTPATIENT
Start: 2024-09-03 | End: 2024-09-03

## 2024-09-03 RX ORDER — ONDANSETRON HYDROCHLORIDE 2 MG/ML
4 INJECTION, SOLUTION INTRAVENOUS EVERY 8 HOURS PRN
Status: DISCONTINUED | OUTPATIENT
Start: 2024-09-03 | End: 2024-09-10 | Stop reason: HOSPADM

## 2024-09-03 RX ADMIN — SODIUM CHLORIDE 500 ML: 9 INJECTION, SOLUTION INTRAVENOUS at 06:09

## 2024-09-03 RX ADMIN — FUROSEMIDE 40 MG: 10 INJECTION, SOLUTION INTRAMUSCULAR; INTRAVENOUS at 09:09

## 2024-09-03 RX ADMIN — ACETAMINOPHEN 1000 MG: 500 TABLET ORAL at 06:09

## 2024-09-03 NOTE — FIRST PROVIDER EVALUATION
"Medical screening examination initiated.  I have conducted a focused provider triage encounter, findings are as follows:    Brief history of present illness:  89 y/o female presents from home where she tells me she lives alone. She Is complaining of diffuse pain. Denies fall.     Vitals:    09/03/24 1344 09/03/24 1447   BP: (!) 152/72 122/80   Pulse: 100 63   Resp: 17 18   Temp: 97.2 °F (36.2 °C)    SpO2: 98% 96%   Weight: 69.9 kg (154 lb)    Height: 5' 3" (1.6 m)        Pertinent physical exam:  alert, nonlabored, in wheel chair     Brief workup plan:  labs and urine    Preliminary workup initiated; this workup will be continued and followed by the physician or advanced practice provider that is assigned to the patient when roomed.  "

## 2024-09-03 NOTE — Clinical Note
Diagnosis: Volume overload [227285]   Future Attending Provider: ADRIANE SOLO II [[id number]]   Admit to which facility:: [facility] [[id number]]   Reason for IP Medical Treatment  (Clinical interventions that can only be accomplished in the IP setting? ) :: diuresis, cis, nephrology   Plans for Post-Acute care--if anticipated (pick the single best option):: A. No post acute care anticipated at this time   Special Needs:: No Special Needs [1]

## 2024-09-04 PROBLEM — I50.23 ACUTE ON CHRONIC SYSTOLIC (CONGESTIVE) HEART FAILURE: Status: ACTIVE | Noted: 2024-09-04

## 2024-09-04 PROBLEM — M10.9 GOUT OF MULTIPLE SITES: Status: ACTIVE | Noted: 2024-09-04

## 2024-09-04 LAB
ANION GAP SERPL CALC-SCNC: 12 MEQ/L
APICAL FOUR CHAMBER EJECTION FRACTION: 53 %
APICAL TWO CHAMBER EJECTION FRACTION: 50 %
AV INDEX (PROSTH): 0.63
AV MEAN GRADIENT: 5 MMHG
AV PEAK GRADIENT: 11 MMHG
AV VALVE AREA BY VELOCITY RATIO: 2.04 CM²
AV VALVE AREA: 1.97 CM²
AV VELOCITY RATIO: 0.65
BASOPHILS # BLD AUTO: 0.01 X10(3)/MCL
BASOPHILS NFR BLD AUTO: 0.1 %
BSA FOR ECHO PROCEDURE: 1.9 M2
BUN SERPL-MCNC: 79 MG/DL (ref 9.8–20.1)
CALCIUM SERPL-MCNC: 10.3 MG/DL (ref 8.4–10.2)
CHLORIDE SERPL-SCNC: 101 MMOL/L (ref 98–111)
CO2 SERPL-SCNC: 23 MMOL/L (ref 23–31)
CREAT SERPL-MCNC: 1.79 MG/DL (ref 0.55–1.02)
CREAT/UREA NIT SERPL: 44
CV ECHO LV RWT: 0.67 CM
DOP CALC AO PEAK VEL: 1.65 M/S
DOP CALC AO VTI: 32.3 CM
DOP CALC LVOT AREA: 3.1 CM2
DOP CALC LVOT DIAMETER: 2 CM
DOP CALC LVOT PEAK VEL: 1.07 M/S
DOP CALC LVOT STROKE VOLUME: 63.74 CM3
DOP CALC MV VTI: 33.2 CM
DOP CALCLVOT PEAK VEL VTI: 20.3 CM
ECHO LV POSTERIOR WALL: 1.5 CM (ref 0.6–1.1)
EOSINOPHIL # BLD AUTO: 0.05 X10(3)/MCL (ref 0–0.9)
EOSINOPHIL NFR BLD AUTO: 0.4 %
ERYTHROCYTE [DISTWIDTH] IN BLOOD BY AUTOMATED COUNT: 14 % (ref 11.5–17)
FLUAV AG UPPER RESP QL IA.RAPID: NOT DETECTED
FLUBV AG UPPER RESP QL IA.RAPID: NOT DETECTED
FRACTIONAL SHORTENING: 22 % (ref 28–44)
GFR SERPLBLD CREATININE-BSD FMLA CKD-EPI: 26 ML/MIN/1.73/M2
GLUCOSE SERPL-MCNC: 101 MG/DL (ref 75–121)
HCT VFR BLD AUTO: 37.3 % (ref 37–47)
HGB BLD-MCNC: 12.5 G/DL (ref 12–16)
IMM GRANULOCYTES # BLD AUTO: 0.07 X10(3)/MCL (ref 0–0.04)
IMM GRANULOCYTES NFR BLD AUTO: 0.6 %
INTERVENTRICULAR SEPTUM: 1.6 CM (ref 0.6–1.1)
LEFT ATRIUM AREA SYSTOLIC (APICAL 2 CHAMBER): 32.7 CM2
LEFT ATRIUM AREA SYSTOLIC (APICAL 4 CHAMBER): 26.1 CM2
LEFT ATRIUM SIZE: 4.4 CM
LEFT ATRIUM VOLUME INDEX MOD: 55.7 ML/M2
LEFT ATRIUM VOLUME MOD: 103 CM3
LEFT INTERNAL DIMENSION IN SYSTOLE: 3.5 CM (ref 2.1–4)
LEFT VENTRICLE DIASTOLIC VOLUME INDEX: 49.95 ML/M2
LEFT VENTRICLE DIASTOLIC VOLUME: 92.4 ML
LEFT VENTRICLE END DIASTOLIC VOLUME APICAL 2 CHAMBER: 139 ML
LEFT VENTRICLE END DIASTOLIC VOLUME APICAL 4 CHAMBER: 95.1 ML
LEFT VENTRICLE END SYSTOLIC VOLUME APICAL 2 CHAMBER: 124 ML
LEFT VENTRICLE END SYSTOLIC VOLUME APICAL 4 CHAMBER: 82.9 ML
LEFT VENTRICLE MASS INDEX: 157 G/M2
LEFT VENTRICLE SYSTOLIC VOLUME INDEX: 27.5 ML/M2
LEFT VENTRICLE SYSTOLIC VOLUME: 50.9 ML
LEFT VENTRICULAR INTERNAL DIMENSION IN DIASTOLE: 4.5 CM (ref 3.5–6)
LEFT VENTRICULAR MASS: 290.01 G
LVED V (TEICH): 92.4 ML
LVES V (TEICH): 50.9 ML
LVOT MG: 2 MMHG
LVOT MV: 0.65 CM/S
LYMPHOCYTES # BLD AUTO: 1.2 X10(3)/MCL (ref 0.6–4.6)
LYMPHOCYTES NFR BLD AUTO: 10.3 %
MCH RBC QN AUTO: 31.6 PG (ref 27–31)
MCHC RBC AUTO-ENTMCNC: 33.5 G/DL (ref 33–36)
MCV RBC AUTO: 94.2 FL (ref 80–94)
MONOCYTES # BLD AUTO: 1.09 X10(3)/MCL (ref 0.1–1.3)
MONOCYTES NFR BLD AUTO: 9.4 %
MV MEAN GRADIENT: 2 MMHG
MV PEAK E VEL: 1.19 M/S
MV PEAK GRADIENT: 5 MMHG
MV VALVE AREA BY CONTINUITY EQUATION: 1.92 CM2
NEUTROPHILS # BLD AUTO: 9.23 X10(3)/MCL (ref 2.1–9.2)
NEUTROPHILS NFR BLD AUTO: 79.2 %
NRBC BLD AUTO-RTO: 0 %
OHS CV RV/LV RATIO: 1.02 CM
OHS LV EJECTION FRACTION SIMPSONS BIPLANE MOD: 51 %
PISA TR MAX VEL: 2.36 M/S
PLATELET # BLD AUTO: 188 X10(3)/MCL (ref 130–400)
PMV BLD AUTO: 10.7 FL (ref 7.4–10.4)
POTASSIUM SERPL-SCNC: 3.7 MMOL/L (ref 3.5–5.1)
RA MAJOR: 5.4 CM
RA WIDTH: 3.9 CM
RBC # BLD AUTO: 3.96 X10(6)/MCL (ref 4.2–5.4)
RIGHT VENTRICLE DIASTOLIC MID DIMENSION: 3.3 CM
RIGHT VENTRICULAR END-DIASTOLIC DIMENSION: 4.6 CM
RSV A 5' UTR RNA NPH QL NAA+PROBE: NOT DETECTED
RV MID DIAMA: 3.3 CM
SARS-COV-2 RNA RESP QL NAA+PROBE: NOT DETECTED
SODIUM SERPL-SCNC: 136 MMOL/L (ref 136–145)
T4 FREE SERPL-MCNC: 1.18 NG/DL (ref 0.7–1.48)
TR MAX PG: 22 MMHG
TRICUSPID ANNULAR PLANE SYSTOLIC EXCURSION: 1.37 CM
TSH SERPL-ACNC: 2.26 UIU/ML (ref 0.35–4.94)
WBC # BLD AUTO: 11.65 X10(3)/MCL (ref 4.5–11.5)
Z-SCORE OF LEFT VENTRICULAR DIMENSION IN END DIASTOLE: -1.33
Z-SCORE OF LEFT VENTRICULAR DIMENSION IN END SYSTOLE: 0.78

## 2024-09-04 PROCEDURE — 87040 BLOOD CULTURE FOR BACTERIA: CPT | Performed by: STUDENT IN AN ORGANIZED HEALTH CARE EDUCATION/TRAINING PROGRAM

## 2024-09-04 PROCEDURE — 0241U COVID/RSV/FLU A&B PCR: CPT | Performed by: INTERNAL MEDICINE

## 2024-09-04 PROCEDURE — 85025 COMPLETE CBC W/AUTO DIFF WBC: CPT | Performed by: STUDENT IN AN ORGANIZED HEALTH CARE EDUCATION/TRAINING PROGRAM

## 2024-09-04 PROCEDURE — 25000003 PHARM REV CODE 250: Performed by: INTERNAL MEDICINE

## 2024-09-04 PROCEDURE — 63600175 PHARM REV CODE 636 W HCPCS: Performed by: NURSE PRACTITIONER

## 2024-09-04 PROCEDURE — 63600175 PHARM REV CODE 636 W HCPCS: Performed by: STUDENT IN AN ORGANIZED HEALTH CARE EDUCATION/TRAINING PROGRAM

## 2024-09-04 PROCEDURE — 25000003 PHARM REV CODE 250: Performed by: STUDENT IN AN ORGANIZED HEALTH CARE EDUCATION/TRAINING PROGRAM

## 2024-09-04 PROCEDURE — 21400001 HC TELEMETRY ROOM

## 2024-09-04 PROCEDURE — 84443 ASSAY THYROID STIM HORMONE: CPT | Performed by: INTERNAL MEDICINE

## 2024-09-04 PROCEDURE — 80048 BASIC METABOLIC PNL TOTAL CA: CPT | Performed by: STUDENT IN AN ORGANIZED HEALTH CARE EDUCATION/TRAINING PROGRAM

## 2024-09-04 PROCEDURE — 84439 ASSAY OF FREE THYROXINE: CPT | Performed by: INTERNAL MEDICINE

## 2024-09-04 PROCEDURE — 27000221 HC OXYGEN, UP TO 24 HOURS

## 2024-09-04 PROCEDURE — 99223 1ST HOSP IP/OBS HIGH 75: CPT | Mod: ,,, | Performed by: INTERNAL MEDICINE

## 2024-09-04 PROCEDURE — 63600175 PHARM REV CODE 636 W HCPCS: Performed by: INTERNAL MEDICINE

## 2024-09-04 RX ORDER — FUROSEMIDE 10 MG/ML
40 INJECTION INTRAMUSCULAR; INTRAVENOUS ONCE
Status: COMPLETED | OUTPATIENT
Start: 2024-09-04 | End: 2024-09-04

## 2024-09-04 RX ORDER — SERTRALINE HYDROCHLORIDE 50 MG/1
50 TABLET, FILM COATED ORAL DAILY
Status: DISCONTINUED | OUTPATIENT
Start: 2024-09-04 | End: 2024-09-10 | Stop reason: HOSPADM

## 2024-09-04 RX ORDER — TRAMADOL HYDROCHLORIDE 50 MG/1
50 TABLET ORAL 3 TIMES DAILY PRN
Status: DISCONTINUED | OUTPATIENT
Start: 2024-09-04 | End: 2024-09-10 | Stop reason: HOSPADM

## 2024-09-04 RX ORDER — SPIRONOLACTONE 25 MG/1
25 TABLET ORAL DAILY
Status: DISCONTINUED | OUTPATIENT
Start: 2024-09-04 | End: 2024-09-10 | Stop reason: HOSPADM

## 2024-09-04 RX ORDER — BUMETANIDE 1 MG/1
1 TABLET ORAL DAILY
Status: DISCONTINUED | OUTPATIENT
Start: 2024-09-05 | End: 2024-09-05

## 2024-09-04 RX ADMIN — TRAMADOL HYDROCHLORIDE 50 MG: 50 TABLET, COATED ORAL at 06:09

## 2024-09-04 RX ADMIN — APIXABAN 2.5 MG: 2.5 TABLET, FILM COATED ORAL at 09:09

## 2024-09-04 RX ADMIN — METHYLPREDNISOLONE SODIUM SUCCINATE 80 MG: 40 INJECTION, POWDER, FOR SOLUTION INTRAMUSCULAR; INTRAVENOUS at 09:09

## 2024-09-04 RX ADMIN — FUROSEMIDE 40 MG: 10 INJECTION, SOLUTION INTRAMUSCULAR; INTRAVENOUS at 05:09

## 2024-09-04 RX ADMIN — CEFTRIAXONE SODIUM 1 G: 1 INJECTION, POWDER, FOR SOLUTION INTRAMUSCULAR; INTRAVENOUS at 12:09

## 2024-09-04 RX ADMIN — FUROSEMIDE 40 MG: 10 INJECTION, SOLUTION INTRAMUSCULAR; INTRAVENOUS at 09:09

## 2024-09-04 RX ADMIN — SPIRONOLACTONE 25 MG: 25 TABLET ORAL at 09:09

## 2024-09-04 RX ADMIN — SERTRALINE HYDROCHLORIDE 50 MG: 50 TABLET ORAL at 09:09

## 2024-09-04 NOTE — PLAN OF CARE
Problem: Adult Inpatient Plan of Care  Goal: Plan of Care Review  Outcome: Progressing  Flowsheets (Taken 9/4/2024 2005)  Plan of Care Reviewed With: child  Goal: Patient-Specific Goal (Individualized)  Outcome: Progressing  Goal: Absence of Hospital-Acquired Illness or Injury  Outcome: Progressing  Goal: Optimal Comfort and Wellbeing  Outcome: Progressing  Goal: Readiness for Transition of Care  Outcome: Progressing     Problem: Skin Injury Risk Increased  Goal: Skin Health and Integrity  Outcome: Progressing     Problem: Fall Injury Risk  Goal: Absence of Fall and Fall-Related Injury  Outcome: Progressing

## 2024-09-04 NOTE — ED PROVIDER NOTES
Encounter Date: 9/3/2024    SCRIBE #1 NOTE: I, Brenda Alejandre, am scribing for, and in the presence of,  Jarrod Blackburn IV, MD. I have scribed the following portions of the note - the EKG reading. Other sections scribed: HPI, ROS, PE.       History     Chief Complaint   Patient presents with    Shoulder Pain     Presents via [facility] with atraumatic bilateral shoulder pain and knee pain. Hx of arthritis. Hasn't taken Lasix in the past couple days because patient reports she is unable to get up due to the arthritic pain.      89 year old female with history of CHF presents to the ED for generalized weakness and SOB. Pt's family member at bedside reports that pt sleeps in a recliner because of her CHF and was not able to get out of it this morning (with and without assistance) due to generalized weakness. Pt was also complaining of bilateral shoulder pain, and her family member notes that pt has had rotator cuff surgery in the past. Pt is on Bumex and spironolactone and has been compliant with her medications but is having worsening leg swelling. Pt also complains of SOB. Her family member notes that pt's urine had a strong odor earlier today. She denies fevers.      The history is provided by the patient and a relative. No  was used.     Review of patient's allergies indicates:   Allergen Reactions    Penicillin Rash     No past medical history on file.  Past Surgical History:   Procedure Laterality Date    CATARACT EXTRACTION, BILATERAL      FOOT SURGERY      MASTECTOMY Right     TOTAL KNEE ARTHROPLASTY Bilateral      Family History   Problem Relation Name Age of Onset    Diabetes Mother      Breast cancer Sister       Social History     Tobacco Use    Smoking status: Never    Smokeless tobacco: Never   Substance Use Topics    Alcohol use: Never    Drug use: Never     Review of Systems   Constitutional:  Negative for chills and fever.        +generalized weakness.    HENT:  Negative for congestion,  rhinorrhea and sore throat.    Eyes:  Negative for visual disturbance.   Respiratory:  Positive for shortness of breath. Negative for cough.    Cardiovascular:  Positive for leg swelling. Negative for chest pain.   Gastrointestinal:  Negative for abdominal pain, nausea and vomiting.   Genitourinary:  Negative for dysuria, hematuria, vaginal bleeding and vaginal discharge.   Musculoskeletal:  Negative for joint swelling.        +bilateral shoulder pain.    Skin:  Negative for rash.   Neurological:  Negative for weakness.   Psychiatric/Behavioral:  Negative for confusion.        Physical Exam     Initial Vitals [09/03/24 1344]   BP Pulse Resp Temp SpO2   (!) 152/72 100 17 97.2 °F (36.2 °C) 98 %      MAP       --         Physical Exam    Nursing note and vitals reviewed.  Constitutional: She is not diaphoretic. No distress.   Chronically ill appearing.    HENT:   Head: Normocephalic and atraumatic.   Mouth/Throat: Mucous membranes are dry.   Neck: Neck supple.   Normal range of motion.  Cardiovascular:  Normal rate and regular rhythm.           No murmur heard.  Pulmonary/Chest: No respiratory distress. She has rales (bilateral bases).   Abdominal: Abdomen is soft. She exhibits no distension. There is no abdominal tenderness.   Musculoskeletal:      Cervical back: Normal range of motion and neck supple.      Right lower leg: 3+ Pitting Edema present.      Left lower leg: 3+ Pitting Edema present.     Neurological: She is alert and oriented to person, place, and time. She has normal strength. No cranial nerve deficit or sensory deficit.   Skin: Skin is warm. Capillary refill takes less than 2 seconds.   Psychiatric: She has a normal mood and affect.         ED Course   Procedures  Labs Reviewed   CBC WITH DIFFERENTIAL - Abnormal       Result Value    WBC 11.91 (*)     RBC 4.17 (*)     Hgb 13.3      Hct 39.9      MCV 95.7 (*)     MCH 31.9 (*)     MCHC 33.3      RDW 14.1      Platelet 214      MPV 11.3 (*)     Neut % 84.5       Lymph % 7.0      Mono % 7.6      Eos % 0.2      Basophil % 0.2      Lymph # 0.83      Neut # 10.08 (*)     Mono # 0.90      Eos # 0.02      Baso # 0.02      IG# 0.06 (*)     IG% 0.5      NRBC% 0.0     COMPREHENSIVE METABOLIC PANEL - Abnormal    Sodium 135 (*)     Potassium 4.2      Chloride 98      CO2 23      Glucose 149 (*)     Blood Urea Nitrogen 78.3 (*)     Creatinine 2.02 (*)     Calcium 10.3 (*)     Protein Total 7.7 (*)     Albumin 3.1 (*)     Globulin 4.6 (*)     Albumin/Globulin Ratio 0.7 (*)     Bilirubin Total 1.4      ALP 87      ALT 7      AST 15      eGFR 23      Anion Gap 14.0      BUN/Creatinine Ratio 39     URINALYSIS, REFLEX TO URINE CULTURE - Abnormal    Color, UA Yellow      Appearance, UA Clear      Specific Gravity, UA 1.015      pH, UA 5.0      Protein, UA Negative      Glucose, UA Normal      Ketones, UA Negative      Blood, UA Negative      Bilirubin, UA Negative      Urobilinogen, UA Normal      Nitrites, UA Negative      Leukocyte Esterase,  (*)     RBC, UA 0-5      WBC, UA 11-20 (*)     Bacteria, UA Trace      Squamous Epithelial Cells, UA Trace      Mucous, UA Trace (*)     Hyaline Casts, UA 3-5 (*)    TROPONIN I - Abnormal    Troponin-I 0.246 (*)    B-TYPE NATRIURETIC PEPTIDE - Abnormal    Natriuretic Peptide 730.3 (*)    MAGNESIUM - Normal    Magnesium Level 2.10     MAGNESIUM - Normal    Magnesium Level 2.10     CULTURE, URINE   BLOOD CULTURE [facility]   BLOOD CULTURE [facility]   CBC W/ AUTO DIFFERENTIAL    Narrative:     The following orders were created for panel order CBC auto differential.  Procedure                               Abnormality         Status                     ---------                               -----------         ------                     CBC with Differential[[id number]]                                                        Please view results for these tests on the individual orders.   BASIC METABOLIC PANEL   CBC WITH DIFFERENTIAL     EKG Readings:  (Independently Interpreted)   DO NOT USE Rhythm: underlying atrial fibrillation. Heart Rate: 60. Ectopy: No Ectopy. Conduction: Normal. ST Segments: Normal ST Segments. T Waves: Normal. Other Impression: No ischemic changes.   EKG performed at 19:24.      ECG Results              EKG 12-lead (Edited Result - FINAL)        Collection Time Result Time QRS Duration OHS QTC Calculation    09/03/24 19:24:50 09/03/24 20:00:59 168 502                     Edited Result - FINAL by Interface, Lab In [facility] (09/03/24 21:10:48)                   Narrative:    Test Reason : M25.519,    Vent. Rate : 060 BPM     Atrial Rate : 107 BPM     P-R Int : 000 ms          QRS Dur : 168 ms      QT Int : 502 ms       P-R-T Axes : 000 158 -09 degrees     QTc Int : 502 ms    Ventricular-paced rhythm  Abnormal ECG  No previous ECGs available  Confirmed by Marta Vergara MD ([id number]) on 9/3/2024 7:58:11 PM  Also confirmed by Marta Vergara MD ([id number])  on 9/3/2024 8:00:55 PM    Referred By:             Confirmed By:Marta Vergara MD                                  Imaging Results              X-Ray Chest AP Portable (Final result)  Result time 09/03/24 22:31:45      Final result by Oj Alonso MD (09/03/24 22:31:45)                   Impression:      Cardiomegaly, questionable right lower lobe infiltrate      Electronically signed by: Oj Alonso MD  Date:    09/03/2024  Time:    22:31               Narrative:    EXAMINATION:  XR CHEST AP PORTABLE    CLINICAL HISTORY:  Shortness of breath    TECHNIQUE:  Single frontal view of the chest was performed.    COMPARISON:  03/04/2022    FINDINGS:  Pacing device is in place.  Heart is enlarged.  There is vascular calcification.  There faint increased markings in the right lower lobe cannot rule out early infiltrate.  Cannot see well behind the heart on the left.                                       Medications   sodium chloride 0.9% flush 10 mL (has no administration in time range)   melatonin tablet 6  mg (has no administration in time range)   ondansetron injection 4 mg (has no administration in time range)   acetaminophen tablet 1,000 mg (1,000 mg Oral Given 9/3/24 1811)   0.9%  NaCl infusion (500 mLs Intravenous New Bag 9/3/24 1811)   furosemide injection 40 mg (40 mg Intravenous Given 9/3/24 2148)   cefTRIAXone (Rocephin) 1 g in D5W 100 mL IVPB (MB+) (0 g Intravenous Stopped 9/4/24 0118)     Medical Decision Making  89-year-old female with a history of CHF presenting with worsening leg swelling generalized weakness not able to take her medicines, not eating or drinking  Exams as above, volume overloaded but likely intravascularly volume depleted   Creatinine elevated BNP elevated will discuss with PCP admit    Differential diagnosis (including but not limited to):   Judging by the patient's chief complaint and pertinent history, the patient has the following possible differential diagnoses, including but not limited to the following.  Some of these are deemed to be lower likelihood and some more likely based on my physical exam and history combined with possible lab work and/or imaging studies.   Please see the pertinent studies, and refer to the HPI.  Some of these diagnoses will take further evaluation to fully rule out, perhaps as an outpatient and the patient was encouraged to follow up when discharged for more comprehensive evaluation.    ACS, pneumonia, COVID/Flu, congestive heart failure, asthma, COPD, pleural effusion, pulmonary edema, acute bronchitis, PE, pneumothorax, hemothorax, aortic dissection, electrolyte abnormalities, anemia, anxiety         Problems Addressed:  LUDWIG (acute kidney injury): acute illness or injury that poses a threat to life or bodily functions  Shoulder pain: acute illness or injury that poses a threat to life or bodily functions  SOB (shortness of breath): acute illness or injury that poses a threat to life or bodily functions  Volume overload: acute illness or injury that  poses a threat to life or bodily functions    Amount and/or Complexity of Data Reviewed  Independent Historian:      Details: Pt's family member at bedside reports that pt sleeps in a recliner because of her CHF and was not able to get out of it this morning (with and without assistance) due to generalized weakness. Pt was also complaining of bilateral shoulder pain, and her family member notes that pt has had rotator cuff surgery in the past. Pt is on Bumex and spironolactone and has been compliant with her medications but is having worsening leg swelling. Pt also complains of SOB. Her family member notes that pt's urine had a strong odor earlier today. She denies fevers.      Labs: ordered.  Radiology: ordered and independent interpretation performed.     Details: CXR - bilateral infiltrates   ECG/medicine tests: ordered and independent interpretation performed.     Details: Rhythm: underlying atrial fibrillation. Heart Rate: 60. Ectopy: No Ectopy. Conduction: Normal. ST Segments: Normal ST Segments. T Waves: Normal. Other Impression: No ischemic changes.   EKG performed at 19:24.    Discussion of management or test interpretation with external provider(s): Internal medicine Dr. Kearns - will admit     Risk  OTC drugs.  Prescription drug management.  Decision regarding hospitalization.              Attending Attestation:           Physician Attestation for Scribe:  Physician Attestation Statement for Scribe #1: I, Jarrod Blackburn IV, MD, reviewed documentation, as scribed by Brenda Alejandre in my presence, and it is both accurate and complete.             ED Course as of 09/04/24 0157   Tue Sep 03, 2024   2124 Paged Dr. Greene [TB]   1486 89-year-old female with a history of CHF presenting with worsening leg swelling generalized weakness not able to take her medicines, not eating or drinking  Exams as above, volume overloaded but likely intravascularly volume depleted   Creatinine elevated BNP elevated will discuss  with PCP admit [AC]   2140 Discussed the case with Dr. Kearns - he recommends diuresis at this time admission with Cardiology and Nephrology consultations [AC]   2158 Patients daughter reports code status DNR  [AC]   2318 Possible early pneumonia on CXR - will treat with rocpehin   [AC]      ED Course User Index  [AC] Jarrod Blackburn IV, MD  [TB] Brenda Alejandre                           Clinical Impression:  Final diagnoses:  [M25.519] Shoulder pain  [R06.02] SOB (shortness of breath)  [E87.70] Volume overload (Primary)  [N17.9] LUDWIG (acute kidney injury)          ED Disposition Condition    Admit Stable                Jarrod Blackburn IV, MD  09/04/24 [id number]

## 2024-09-04 NOTE — HPI
89 year old white female with history of CHF presented to the ED 9/3 for generalized weakness and SOB. Patient sleeps in her recliner because of CHF and was not able to get out of the chair due to generalized weakness. Patient also reports bilateral shoulder and wrist pain over the past 1-2 weeks. She is on Bumex and spironolactone and has been compliant with her medications. She reports worsening leg swelling and SOB.  BNP was elevated, and chest x-ray showed cardiomegaly.  She was given a dose of IV Lasix last night.  She is currently on 1 L of oxygen with oxygen saturation of 98%.    She has a past medical history of chronic systolic heart failure, paroxysmal atrial fibrillation, gout, hypertension, hyperparathyroidism among other conditions.  She lives alone.  She denies alcohol, tobacco, or drug use.  See below for more details of social history, family history, past medical history etc

## 2024-09-04 NOTE — PROGRESS NOTES
Inpatient Nutrition Evaluation    Admit Date: 9/3/2024   Total duration of encounter: 1 day   Patient Age: 89 y.o.    Nutrition Recommendation/Prescription     -Continue Cardiac Diet as tolerated.   -Monitor wt, labs, and intake.     Nutrition Assessment     Chart Review    Reason Seen: continuous nutrition monitoring    Malnutrition Screening Tool Results   Have you recently lost weight without trying?: Unsure  Have you been eating poorly because of a decreased appetite?: No   MST Score: 2   Diagnosis:  Acute on chronic systolic (congestive) heart failure  Paroxysmal atrial fibrillation  Chronic kidney disease (CKD), stage IV (severe)  Primary hypertension  Gout of multiple sites  Hyperthyroidism    Relevant Medical History: CHF    Scheduled Medications:  apixaban, 2.5 mg, BID  [START ON 9/5/2024] bumetanide, 1 mg, Daily  furosemide (LASIX) injection, 40 mg, Once  methIMAzole, 2.5 mg, Daily  sertraline, 50 mg, Daily  spironolactone, 25 mg, Daily    Continuous Infusions:   PRN Medications:   Current Facility-Administered Medications:     melatonin, 6 mg, Oral, Nightly PRN    ondansetron, 4 mg, Intravenous, Q8H PRN    sodium chloride 0.9%, 10 mL, Intravenous, PRN    traMADoL, 50 mg, Oral, TID PRN    Recent Labs   Lab 09/03/24  1417 09/03/24  2208 09/04/24  0351   *  --  136   K 4.2  --  3.7   CALCIUM 10.3*  --  10.3*   MG 2.10 2.10  --    CL 98  --  101   CO2 23  --  23   BUN 78.3*  --  79.0*   CREATININE 2.02*  --  1.79*   EGFRNORACEVR 23  --  26   GLUCOSE 149*  --  101   BILITOT 1.4  --   --    ALKPHOS 87  --   --    ALT 7  --   --    AST 15  --   --    ALBUMIN 3.1*  --   --    WBC 11.91*  --  11.65*   HGB 13.3  --  12.5   HCT 39.9  --  37.3     Nutrition Orders:  Diet Cardiac      Appetite/Oral Intake: good/% of meals  Factors Affecting Nutritional Intake: none identified  Food/Jehovah's witness/Cultural Preferences: none reported  Food Allergies: no known food allergies  Last Bowel Movement:  "24  Wound(s):  skin intact per EMR    Comments    24: Pt reports good appetite and usual good intake/appetite prior to admit; Pt denies n/v and reports a usual wt of ~182 lbs.     Anthropometrics    Height: 5' 3" (160 cm), Height Method: Stated  Last Weight: 81.3 kg (179 lb 3.7 oz) (24 1318), Weight Method: Bed Scale  BMI (Calculated): 31.8  BMI Classification: obese grade I (BMI 30-34.9)     Ideal Body Weight (IBW), Female: 115 lb     % Ideal Body Weight, Female (lb): 155.86 %                    Usual Body Weight (UBW), k.8 kg  % Usual Body Weight: 99.6  % Weight Change From Usual Weight: -0.61 %  Usual Weight Provided By: patient    Wt Readings from Last 5 Encounters:   24 81.3 kg (179 lb 3.7 oz)   24 69.9 kg (154 lb)   24 69.9 kg (154 lb)   23 81.6 kg (180 lb)   23 79.4 kg (175 lb)     Weight Change(s) Since Admission:   Wt Readings from Last 1 Encounters:   24 1318 81.3 kg (179 lb 3.7 oz)   24 1304 81.3 kg (179 lb 3.7 oz)   24 1302 69.9 kg (154 lb)   24 1344 69.9 kg (154 lb)   Admit Weight: 69.9 kg (154 lb) (24 1344), Weight Method: Stated    Patient Education     Not applicable.    Nutrition Goals & Monitoring     Dietitian will monitor: energy intake and weight    Nutrition Risk/Follow-Up: low (follow-up in 5-7 days)  Patients assigned 'low nutrition risk' status do not qualify for a full nutritional assessment but will be monitored and re-evaluated in a 5-7 day time period. Please consult if re-evaluation needed sooner.   "

## 2024-09-04 NOTE — H&P
[facility] - Emergency [facility] Medicine  History & Physical    Patient Name: Baudilio Mantilla  MRN: [id number]  Patient Class: IP- Inpatient  Admission Date: 9/3/2024  Attending Physician: Saman Kearns II, MD   Primary Care Provider: Saman Kearns II, MD         Patient information was obtained from patient and ER records.     Subjective:     Principal Problem:Acute on chronic systolic (congestive) heart failure    Chief Complaint:   Chief Complaint   Patient presents with    Shoulder Pain     Presents via [facility] with atraumatic bilateral shoulder pain and knee pain. Hx of arthritis. Hasn't taken Lasix in the past couple days because patient reports she is unable to get up due to the arthritic pain.         HPI: 89 year old white female with history of CHF presented to the ED 9/3 for generalized weakness and SOB. Patient sleeps in her recliner because of CHF and was not able to get out of the chair due to generalized weakness. Patient also reports bilateral shoulder and wrist pain over the past 1-2 weeks. She is on Bumex and spironolactone and has been compliant with her medications. She reports worsening leg swelling and SOB.  BNP was elevated, and chest x-ray showed cardiomegaly.  She was given a dose of IV Lasix last night.  She is currently on 1 L of oxygen with oxygen saturation of 98%.    She has a past medical history of chronic systolic heart failure, paroxysmal atrial fibrillation, gout, hypertension, hyperparathyroidism among other conditions.  She lives alone.  She denies alcohol, tobacco, or drug use.  See below for more details of social history, family history, past medical history etc    No past medical history on file.    Past Surgical History:   Procedure Laterality Date    CATARACT EXTRACTION, BILATERAL      FOOT SURGERY      MASTECTOMY Right     TOTAL KNEE ARTHROPLASTY Bilateral        Review of patient's allergies indicates:   Allergen Reactions    Penicillin Rash        No current facility-administered medications on file prior to encounter.     Current Outpatient Medications on File Prior to Encounter   Medication Sig    bumetanide (BUMEX) 1 MG tablet 2 tablets po q am, and 1 tablet po q noon.    colchicine (COLCRYS) 0.6 mg tablet Take 2 tablets now then 1 tablet daily for 6 days (Patient taking differently: Take 2 tablets now then 1 tablet daily for 6 days PRN gout flare ups)    diclofenac sodium (VOLTAREN) 1 % Gel SMARTSI Gram(s) Topical 4 Times Daily PRN    ELIQUIS 5 mg Tab Take 1 tablet (5 mg total) by mouth 2 (two) times daily.    ferrous sulfate (FEOSOL ORAL) Take 325 mg by mouth every Mon, Wed, Fri.    meclizine (ANTIVERT) 12.5 mg tablet Take 1 tablet (12.5 mg total) by mouth 3 (three) times daily as needed for Dizziness.    methIMAzole (TAPAZOLE) 5 MG Tab 0.5 tablet po daily.    nystatin (MYCOSTATIN) cream Apply topically 2 (two) times daily. for 7 days    pantoprazole (PROTONIX) 40 MG tablet Take 1 tablet (40 mg total) by mouth 2 (two) times daily.    sertraline (ZOLOFT) 50 MG tablet Take 1 tablet (50 mg total) by mouth once daily.    spironolactone (ALDACTONE) 25 MG tablet Take 1 tablet (25 mg total) by mouth once daily.    traMADoL (ULTRAM) 50 mg tablet Take 1 tablet (50 mg total) by mouth 2 (two) times daily as needed for Pain.    triamcinolone acetonide 0.1% (KENALOG) 0.1 % ointment Apply topically 2 (two) times daily.     Family History       Problem Relation (Age of Onset)    Breast cancer Sister    Diabetes Mother          Tobacco Use    Smoking status: Never    Smokeless tobacco: Never   Substance and Sexual Activity    Alcohol use: Never    Drug use: Never    Sexual activity: Never     Review of Systems   Constitutional:  Positive for fatigue. Negative for fever.   Respiratory:  Positive for shortness of breath.    Cardiovascular:  Positive for leg swelling. Negative for chest pain.   Musculoskeletal:  Positive for arthralgias and joint swelling.    Neurological:  Positive for weakness.     Objective:     Vital Signs (Most Recent):  Temp: 97.6 °F (36.4 °C) (09/04/24 0614)  Pulse: 61 (09/04/24 0702)  Resp: (!) 23 (09/04/24 0702)  BP: 116/70 (09/04/24 0702)  SpO2: 98 % (09/04/24 0702) Vital Signs (24h Range):  Temp:  [97.2 °F (36.2 °C)-97.6 °F (36.4 °C)] 97.6 °F (36.4 °C)  Pulse:  [] 61  Resp:  [15-29] 23  SpO2:  [95 %-99 %] 98 %  BP: ()/(50-89) 116/70     Weight: 69.9 kg (154 lb)  Body mass index is 27.28 kg/m².     Physical Exam  Eyes:      Extraocular Movements: Extraocular movements intact.      Pupils: Pupils are equal, round, and reactive to light.   Cardiovascular:      Rate and Rhythm: Normal rate.   Pulmonary:      Breath sounds: Rhonchi present.   Musculoskeletal:      Right lower leg: Edema present.      Left lower leg: Edema present.   Neurological:      Mental Status: She is alert and oriented to person, place, and time.   Psychiatric:         Mood and Affect: Mood normal.              CRANIAL NERVES     CN III, IV, VI   Pupils are equal, round, and reactive to light.       Significant Labs: All pertinent labs within the past 24 hours have been reviewed.    Significant Imaging: I have reviewed all pertinent imaging results/findings within the past 24 hours.  Assessment/Plan:     * Acute on chronic systolic (congestive) heart failure  - BNP elevated  - CXR showed cardiomegaly and questionable pneumonia.  CT chest today to clarify fluid overload versus pneumonia, no fever.  - Echo, consult Cardiology  - She was given a dose of IV Lasix in the ER last night, output 1 L  - On 1 L nasal cannula, oxygen saturation 98%  - Resume home dose of spironolactone; give another dose of IV Lasix and then defer diuretic to Cardiology/Nephrology    Paroxysmal atrial fibrillation  - Diagnosed in 2015, had digoxin toxicity in 2016 and was in the ICU with heart rate in the 20s and 30s. At that time, she was on digoxin, amiodarone, and Cardizem. Pacemaker  placed in 2020, no longer on metoprolol  - Rate controlled  - Lower dose of Eliquis to 2.5 mg    Chronic kidney disease (CKD), stage IV (severe)  - Consult nephrology, renal ultrasound    Primary hypertension  - Stable    Gout of multiple sites  - Bilateral wrist and shoulder pain, no recent trauma.  Likely gout flare-up  - She had allergy to allopurinol.  Give dose of IV steroid    Hyperthyroidism  - On methimazole 2.5 mg daily  - Check TSH and free T4 this AM      VTE Risk Mitigation (From admission, onward)           Ordered     apixaban tablet 2.5 mg  2 times daily         09/04/24 0744     IP VTE HIGH RISK PATIENT  Once         09/03/24 2159     Place sequential compression device  Until discontinued         09/03/24 2159                                    Saman Kearns II, MD  Department of Hospital Medicine  [facility] - Emergency Dept

## 2024-09-04 NOTE — CONSULTS
Nephrology Initial Consult Note    Patient Name: Baudilio Mantilla  Age: 89 y.o.  : 1932  MRN: [id number]  Admission Date: 9/3/2024    Reason for Consult:      LUDWIG on CKD    HPI:     Baudilio Mantilla is a 89 y.o. female has been admitted to the hospital for shortness of breath PND orthopnea and pedal edema.  Patient reports that she does not have any history of any kidney problems of any kind.  No kidney stones no kidney infections no hematuria no hesitancy frequency urgency.  No dysuria.  She has been having pedal edema and has been sleeping in a recliner because difficulty breathing in the bed.  She remains weak and has lot of arthritis pains also.  Patient reports that she has poor appetite.  No nausea no vomiting.  Denies chest pains or abdominal pains.  Denies diarrhea constipation.  Denies fever or chills.  Her significant previous medical history has been gout, thyroid problem , osteoarthritis problem and atrial fibrillation.        Current Facility-Administered Medications   Medication Dose Route Frequency Provider Last Rate Last Admin    melatonin tablet 6 mg  6 mg Oral Nightly PRN Jarrod Blackburn IV, MD        ondansetron injection 4 mg  4 mg Intravenous Q8H PRN Jarrod Blackburn IV, MD        sodium chloride 0.9% flush 10 mL  10 mL Intravenous PRN Jarrod Blackburn IV, MD        traMADoL tablet 50 mg  50 mg Oral TID PRN Saman Kearns II, MD   50 mg at 24 0606     Current Outpatient Medications   Medication Sig Dispense Refill    bumetanide (BUMEX) 1 MG tablet 2 tablets po q am, and 1 tablet po q noon. 90 tablet 11    colchicine (COLCRYS) 0.6 mg tablet Take 2 tablets now then 1 tablet daily for 6 days (Patient taking differently: Take 2 tablets now then 1 tablet daily for 6 days PRN gout flare ups) 10 tablet 6    diclofenac sodium (VOLTAREN) 1 % Gel SMARTSI Gram(s) Topical 4 Times Daily PRN      ELIQUIS 5 mg Tab Take 1 tablet (5 mg total) by mouth 2 (two) times daily. 60 tablet 11    ferrous  sulfate (FEOSOL ORAL) Take 325 mg by mouth every Mon, Wed, Fri.      meclizine (ANTIVERT) 12.5 mg tablet Take 1 tablet (12.5 mg total) by mouth 3 (three) times daily as needed for Dizziness. 30 tablet 5    methIMAzole (TAPAZOLE) 5 MG Tab 0.5 tablet po daily. 15 tablet 11    nystatin (MYCOSTATIN) cream Apply topically 2 (two) times daily. for 7 days 30 g 2    pantoprazole (PROTONIX) 40 MG tablet Take 1 tablet (40 mg total) by mouth 2 (two) times daily. 60 tablet 11    sertraline (ZOLOFT) 50 MG tablet Take 1 tablet (50 mg total) by mouth once daily. 30 tablet 11    spironolactone (ALDACTONE) 25 MG tablet Take 1 tablet (25 mg total) by mouth once daily. 30 tablet 11    traMADoL (ULTRAM) 50 mg tablet Take 1 tablet (50 mg total) by mouth 2 (two) times daily as needed for Pain. 30 tablet 3    triamcinolone acetonide 0.1% (KENALOG) 0.1 % ointment Apply topically 2 (two) times daily. 453.6 g 1       Saman Kearns II, MD    No past medical history on file.   Past Surgical History:   Procedure Laterality Date    CATARACT EXTRACTION, BILATERAL      FOOT SURGERY      MASTECTOMY Right     TOTAL KNEE ARTHROPLASTY Bilateral       Family History   Problem Relation Name Age of Onset    Diabetes Mother      Breast cancer Sister       Social History     Tobacco Use    Smoking status: Never    Smokeless tobacco: Never   Substance Use Topics    Alcohol use: Never     (Not in a hospital admission)    Review of patient's allergies indicates:   Allergen Reactions    Penicillin Rash            Review of Systems:     All 12 point review of system done and is negative except 1 history of present illness.    Objective:       VITAL SIGNS: 24 HR MIN & MAX LAST    Temp  Min: 97.2 °F (36.2 °C)  Max: 97.6 °F (36.4 °C)  97.6 °F (36.4 °C)        BP  Min: 93/59  Max: 152/72  (!) 125/51     Pulse  Min: 60  Max: 100  61     Resp  Min: 15  Max: 29  (!) 21    SpO2  Min: 95 %  Max: 99 %  99 %      GEN:  Moderately developed and nourished.  Awake alert  oriented to time person place lying down in the bed.  Slept better last night with the oxygen.  HEENT:  Atraumatic normocephalic.  Pupils reactive.  Extraocular movements intact.  Dry mucous membranes in the mouth.  No other oral lesion.  No visible JVD noted.  No thyromegaly noted.  CV:  Irregularly irregular with heart rate about 60.  PULM:  Decreased breath sounds bibasilar area  ABD: Soft, NT/ND abdomen with NABS  EXT: No cyanosis, 2 to 3+ edema  SKIN: Warm and dry  PSYCH: Awake, alert, and appropriately conversant            Component Value Date/Time     09/04/2024 0351     (L) 09/03/2024 1417    K 3.7 09/04/2024 0351    K 4.2 09/03/2024 1417    CO2 23 09/04/2024 0351    CO2 23 09/03/2024 1417    BUN 79.0 (H) 09/04/2024 0351    BUN 78.3 (H) 09/03/2024 1417    CREATININE 1.79 (H) 09/04/2024 0351    CREATININE 2.02 (H) 09/03/2024 1417    CALCIUM 10.3 (H) 09/04/2024 0351    CALCIUM 10.3 (H) 09/03/2024 1417    PHOS 3.1 01/17/2018 0622            Component Value Date/Time    WBC 11.65 (H) 09/04/2024 0351    WBC 11.91 (H) 09/03/2024 1417    HGB 12.5 09/04/2024 0351    HGB 13.3 09/03/2024 1417    HCT 37.3 09/04/2024 0351    HCT 39.9 09/03/2024 1417     09/04/2024 0351     09/03/2024 1417         X-Ray Chest AP Portable   Final Result      Cardiomegaly, questionable right lower lobe infiltrate         Electronically signed by: Oj Alonso MD   Date:    09/03/2024   Time:    22:31      US Retroperitoneal Complete    (Results Pending)       Assessment / Plan:   Records revealed the patient does have 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  She does have slightly elevated BUN and creatinine which is probably secondary to LUWDIG secondary to ATN secondary to probably hypoxia of shortness of breath.  Atrial fibrillation with a controlled heart rate  Clinically patient is already intravascularly on volume depleted side.  Hemoglobin is normal.  Serum calcium is  elevated.  She rather needs more fluids then diuretics.  Hyper globulinemia will do workup  Pedal edema unknown etiology.  Agree with the cardiac workup.    Recommendations  Renal ultrasound  24 hour urine for urine protein electrophoresis and immunoelectrophoresis  Serum protein immunoelectrophoresis  Avoid nephrotoxic agents  Will follow with you    Thank you for this consultation

## 2024-09-04 NOTE — NURSING
Nurses Note -- 4 Eyes      9/4/2024   3:20 PM      Skin assessed during: Admit      [] No Altered Skin Integrity Present    []Prevention Measures Documented      [x] Yes- Altered Skin Integrity Present or Discovered   [x] LDA Added if Not in Epic (Describe Wound)   [x] New Altered Skin Integrity was Present on Admit and Documented in LDA   [x] Wound Image Taken    Wound Care Consulted? Yes    Attending Nurse:  Citlalli Brewer RN/Staff Member:   (BREANNA Felder)

## 2024-09-04 NOTE — ASSESSMENT & PLAN NOTE
- Bilateral wrist and shoulder pain, no recent trauma.  Likely gout flare-up  - She had allergy to allopurinol.  Give dose of IV steroid

## 2024-09-04 NOTE — ASSESSMENT & PLAN NOTE
- BNP elevated  - CXR showed cardiomegaly and questionable pneumonia.  CT chest today to clarify fluid overload versus pneumonia, no fever.  - Echo, consult Cardiology  - She was given a dose of IV Lasix in the ER last night, output 1 L  - On 1 L nasal cannula, oxygen saturation 98%  - Resume home dose of spironolactone; give another dose of IV Lasix and then defer diuretic to Cardiology/Nephrology

## 2024-09-04 NOTE — ASSESSMENT & PLAN NOTE
- Diagnosed in 2015, had digoxin toxicity in 2016 and was in the ICU with heart rate in the 20s and 30s. At that time, she was on digoxin, amiodarone, and Cardizem. Pacemaker placed in 2020, no longer on metoprolol  - Rate controlled  - Lower dose of Eliquis to 2.5 mg

## 2024-09-04 NOTE — NURSING
Admission documentation completed by the admit nurse. Home med rec incomplete, instructed pt to have daughter bring in list. Pt did not elect for meds to bed with [facility]. 4 Eyes and standing weight to be completed by the admitting floor nurse. Pressure Injury Prevention order set ordered at this time, admitting nurse to order PUP packet.

## 2024-09-04 NOTE — ED NOTES
"Assumed care of pt. At this time.. pt. Denies needs she reports "leave me alone".. instructed to call if she needs anything  "

## 2024-09-04 NOTE — SUBJECTIVE & OBJECTIVE
No past medical history on file.    Past Surgical History:   Procedure Laterality Date    CATARACT EXTRACTION, BILATERAL      FOOT SURGERY      MASTECTOMY Right     TOTAL KNEE ARTHROPLASTY Bilateral        Review of patient's allergies indicates:   Allergen Reactions    Penicillin Rash       No current facility-administered medications on file prior to encounter.     Current Outpatient Medications on File Prior to Encounter   Medication Sig    bumetanide (BUMEX) 1 MG tablet 2 tablets po q am, and 1 tablet po q noon.    colchicine (COLCRYS) 0.6 mg tablet Take 2 tablets now then 1 tablet daily for 6 days (Patient taking differently: Take 2 tablets now then 1 tablet daily for 6 days PRN gout flare ups)    diclofenac sodium (VOLTAREN) 1 % Gel SMARTSI Gram(s) Topical 4 Times Daily PRN    ELIQUIS 5 mg Tab Take 1 tablet (5 mg total) by mouth 2 (two) times daily.    ferrous sulfate (FEOSOL ORAL) Take 325 mg by mouth every Mon, Wed, Fri.    meclizine (ANTIVERT) 12.5 mg tablet Take 1 tablet (12.5 mg total) by mouth 3 (three) times daily as needed for Dizziness.    methIMAzole (TAPAZOLE) 5 MG Tab 0.5 tablet po daily.    nystatin (MYCOSTATIN) cream Apply topically 2 (two) times daily. for 7 days    pantoprazole (PROTONIX) 40 MG tablet Take 1 tablet (40 mg total) by mouth 2 (two) times daily.    sertraline (ZOLOFT) 50 MG tablet Take 1 tablet (50 mg total) by mouth once daily.    spironolactone (ALDACTONE) 25 MG tablet Take 1 tablet (25 mg total) by mouth once daily.    traMADoL (ULTRAM) 50 mg tablet Take 1 tablet (50 mg total) by mouth 2 (two) times daily as needed for Pain.    triamcinolone acetonide 0.1% (KENALOG) 0.1 % ointment Apply topically 2 (two) times daily.     Family History       Problem Relation (Age of Onset)    Breast cancer Sister    Diabetes Mother          Tobacco Use    Smoking status: Never    Smokeless tobacco: Never   Substance and Sexual Activity    Alcohol use: Never    Drug use: Never    Sexual  activity: Never     Review of Systems   Constitutional:  Positive for fatigue. Negative for fever.   Respiratory:  Positive for shortness of breath.    Cardiovascular:  Positive for leg swelling. Negative for chest pain.   Musculoskeletal:  Positive for arthralgias and joint swelling.   Neurological:  Positive for weakness.     Objective:     Vital Signs (Most Recent):  Temp: 97.6 °F (36.4 °C) (09/04/24 0614)  Pulse: 61 (09/04/24 0702)  Resp: (!) 23 (09/04/24 0702)  BP: 116/70 (09/04/24 0702)  SpO2: 98 % (09/04/24 0702) Vital Signs (24h Range):  Temp:  [97.2 °F (36.2 °C)-97.6 °F (36.4 °C)] 97.6 °F (36.4 °C)  Pulse:  [] 61  Resp:  [15-29] 23  SpO2:  [95 %-99 %] 98 %  BP: ()/(50-89) 116/70     Weight: 69.9 kg (154 lb)  Body mass index is 27.28 kg/m².     Physical Exam  Eyes:      Extraocular Movements: Extraocular movements intact.      Pupils: Pupils are equal, round, and reactive to light.   Cardiovascular:      Rate and Rhythm: Normal rate.   Pulmonary:      Breath sounds: Rhonchi present.   Musculoskeletal:      Right lower leg: Edema present.      Left lower leg: Edema present.   Neurological:      Mental Status: She is alert and oriented to person, place, and time.   Psychiatric:         Mood and Affect: Mood normal.              CRANIAL NERVES     CN III, IV, VI   Pupils are equal, round, and reactive to light.       Significant Labs: All pertinent labs within the past 24 hours have been reviewed.    Significant Imaging: I have reviewed all pertinent imaging results/findings within the past 24 hours.

## 2024-09-05 LAB
ALBUMIN SERPL-MCNC: 2.7 G/DL (ref 3.4–4.8)
ALBUMIN/GLOB SERPL: 0.8 RATIO (ref 1.1–2)
ALP SERPL-CCNC: 82 UNIT/L (ref 40–150)
ALT SERPL-CCNC: 11 UNIT/L (ref 0–55)
ANION GAP SERPL CALC-SCNC: 10 MEQ/L
AST SERPL-CCNC: 17 UNIT/L (ref 5–34)
BACTERIA UR CULT: ABNORMAL
BASOPHILS # BLD AUTO: 0.01 X10(3)/MCL
BASOPHILS NFR BLD AUTO: 0.1 %
BILIRUB SERPL-MCNC: 0.7 MG/DL
BUN SERPL-MCNC: 85.6 MG/DL (ref 9.8–20.1)
CALCIUM SERPL-MCNC: 10 MG/DL (ref 8.4–10.2)
CHLORIDE SERPL-SCNC: 102 MMOL/L (ref 98–111)
CO2 SERPL-SCNC: 23 MMOL/L (ref 23–31)
CREAT SERPL-MCNC: 1.65 MG/DL (ref 0.55–1.02)
CREAT/UREA NIT SERPL: 52
EOSINOPHIL # BLD AUTO: 0 X10(3)/MCL (ref 0–0.9)
EOSINOPHIL NFR BLD AUTO: 0 %
ERYTHROCYTE [DISTWIDTH] IN BLOOD BY AUTOMATED COUNT: 14.2 % (ref 11.5–17)
GFR SERPLBLD CREATININE-BSD FMLA CKD-EPI: 29 ML/MIN/1.73/M2
GLOBULIN SER-MCNC: 3.5 GM/DL (ref 2.4–3.5)
GLUCOSE SERPL-MCNC: 180 MG/DL (ref 75–121)
HCT VFR BLD AUTO: 38.7 % (ref 37–47)
HGB BLD-MCNC: 12.8 G/DL (ref 12–16)
IMM GRANULOCYTES # BLD AUTO: 0.08 X10(3)/MCL (ref 0–0.04)
IMM GRANULOCYTES NFR BLD AUTO: 0.8 %
LYMPHOCYTES # BLD AUTO: 0.5 X10(3)/MCL (ref 0.6–4.6)
LYMPHOCYTES NFR BLD AUTO: 4.8 %
MAGNESIUM SERPL-MCNC: 2.3 MG/DL (ref 1.6–2.6)
MCH RBC QN AUTO: 31.8 PG (ref 27–31)
MCHC RBC AUTO-ENTMCNC: 33.1 G/DL (ref 33–36)
MCV RBC AUTO: 96 FL (ref 80–94)
MONOCYTES # BLD AUTO: 0.6 X10(3)/MCL (ref 0.1–1.3)
MONOCYTES NFR BLD AUTO: 5.8 %
NEUTROPHILS # BLD AUTO: 9.17 X10(3)/MCL (ref 2.1–9.2)
NEUTROPHILS NFR BLD AUTO: 88.5 %
NRBC BLD AUTO-RTO: 0 %
PLATELET # BLD AUTO: 211 X10(3)/MCL (ref 130–400)
PMV BLD AUTO: 11.2 FL (ref 7.4–10.4)
POTASSIUM SERPL-SCNC: 4.2 MMOL/L (ref 3.5–5.1)
PROT SERPL-MCNC: 6.2 GM/DL (ref 5.8–7.6)
RBC # BLD AUTO: 4.03 X10(6)/MCL (ref 4.2–5.4)
SODIUM SERPL-SCNC: 135 MMOL/L (ref 136–145)
WBC # BLD AUTO: 10.36 X10(3)/MCL (ref 4.5–11.5)

## 2024-09-05 PROCEDURE — 97530 THERAPEUTIC ACTIVITIES: CPT

## 2024-09-05 PROCEDURE — 94760 N-INVAS EAR/PLS OXIMETRY 1: CPT

## 2024-09-05 PROCEDURE — 97166 OT EVAL MOD COMPLEX 45 MIN: CPT

## 2024-09-05 PROCEDURE — 63600175 PHARM REV CODE 636 W HCPCS: Performed by: INTERNAL MEDICINE

## 2024-09-05 PROCEDURE — 27000221 HC OXYGEN, UP TO 24 HOURS

## 2024-09-05 PROCEDURE — 85025 COMPLETE CBC W/AUTO DIFF WBC: CPT | Performed by: INTERNAL MEDICINE

## 2024-09-05 PROCEDURE — 97162 PT EVAL MOD COMPLEX 30 MIN: CPT

## 2024-09-05 PROCEDURE — 83735 ASSAY OF MAGNESIUM: CPT | Performed by: INTERNAL MEDICINE

## 2024-09-05 PROCEDURE — 25000003 PHARM REV CODE 250: Performed by: NURSE PRACTITIONER

## 2024-09-05 PROCEDURE — 25000003 PHARM REV CODE 250: Performed by: INTERNAL MEDICINE

## 2024-09-05 PROCEDURE — 36415 COLL VENOUS BLD VENIPUNCTURE: CPT | Performed by: INTERNAL MEDICINE

## 2024-09-05 PROCEDURE — 21400001 HC TELEMETRY ROOM

## 2024-09-05 PROCEDURE — 80053 COMPREHEN METABOLIC PANEL: CPT | Performed by: INTERNAL MEDICINE

## 2024-09-05 PROCEDURE — 99232 SBSQ HOSP IP/OBS MODERATE 35: CPT | Mod: ,,, | Performed by: INTERNAL MEDICINE

## 2024-09-05 RX ORDER — MICONAZOLE NITRATE 2 G/100G
POWDER TOPICAL 2 TIMES DAILY
Status: DISCONTINUED | OUTPATIENT
Start: 2024-09-05 | End: 2024-09-10 | Stop reason: HOSPADM

## 2024-09-05 RX ORDER — BUMETANIDE 1 MG/1
2 TABLET ORAL DAILY
Status: DISCONTINUED | OUTPATIENT
Start: 2024-09-05 | End: 2024-09-10

## 2024-09-05 RX ADMIN — APIXABAN 2.5 MG: 2.5 TABLET, FILM COATED ORAL at 09:09

## 2024-09-05 RX ADMIN — METHIMAZOLE 2.5 MG: 5 TABLET ORAL at 09:09

## 2024-09-05 RX ADMIN — SPIRONOLACTONE 25 MG: 25 TABLET ORAL at 09:09

## 2024-09-05 RX ADMIN — TRAMADOL HYDROCHLORIDE 50 MG: 50 TABLET, COATED ORAL at 09:09

## 2024-09-05 RX ADMIN — METHYLPREDNISOLONE SODIUM SUCCINATE 80 MG: 40 INJECTION, POWDER, FOR SOLUTION INTRAMUSCULAR; INTRAVENOUS at 09:09

## 2024-09-05 RX ADMIN — BUMETANIDE 2 MG: 1 TABLET ORAL at 09:09

## 2024-09-05 RX ADMIN — TRAMADOL HYDROCHLORIDE 50 MG: 50 TABLET, COATED ORAL at 06:09

## 2024-09-05 RX ADMIN — METOPROLOL SUCCINATE 12.5 MG: 25 TABLET, EXTENDED RELEASE ORAL at 12:09

## 2024-09-05 RX ADMIN — SERTRALINE HYDROCHLORIDE 50 MG: 50 TABLET ORAL at 09:09

## 2024-09-05 NOTE — PROGRESS NOTES
[facility] - 4th Floor [facility] Medicine  Progress Note    Patient Name: Baudilio Mantilla  MRN: [id number]  Patient Class: IP- Inpatient   Admission Date: 9/3/2024  Length of Stay: 2 days  Attending Physician: Saman Kearns II, MD  Primary Care Provider: Saman Kearns II, MD        Subjective:     Principal Problem:Acute on chronic systolic (congestive) heart failure        HPI:  89 year old white female with history of CHF presented to the ED 9/3 for generalized weakness and SOB. Patient sleeps in her recliner because of CHF and was not able to get out of the chair due to generalized weakness. Patient also reports bilateral shoulder and wrist pain over the past 1-2 weeks. She is on Bumex and spironolactone and has been compliant with her medications. She reports worsening leg swelling and SOB.  BNP was elevated, and chest x-ray showed cardiomegaly.  She was given a dose of IV Lasix last night.  She is currently on 1 L of oxygen with oxygen saturation of 98%.    She has a past medical history of chronic systolic heart failure, paroxysmal atrial fibrillation, gout, hypertension, hyperparathyroidism among other conditions.  She lives alone.  She denies alcohol, tobacco, or drug use.  See below for more details of social history, family history, past medical history etc    Overview/Hospital Course:  No notes on file    Interval History:  Stable overnight.  Granddaughter at bedside.  No longer on oxygen.  Her main concern is ongoing left wrist pain related to gout.  It is somewhat improved with dose of IV steroid yesterday.    Review of Systems   Constitutional:  Negative for fever.   Respiratory:  Negative for cough.    Cardiovascular:  Negative for chest pain.   Musculoskeletal:  Positive for arthralgias.   Neurological:  Positive for weakness.     Objective:     Vital Signs (Most Recent):  Temp: 97.8 °F (36.6 °C) (09/05/24 0717)  Pulse: 60 (09/05/24 0717)  Resp: 18 (09/05/24  0717)  BP: 114/62 (09/05/24 0717)  SpO2: 95 % (09/05/24 0717) Vital Signs (24h Range):  Temp:  [97.5 °F (36.4 °C)-99.1 °F (37.3 °C)] 97.8 °F (36.6 °C)  Pulse:  [59-78] 60  Resp:  [16-18] 18  SpO2:  [93 %-99 %] 95 %  BP: (114-146)/(50-71) 114/62     Weight: 80.9 kg (178 lb 5.6 oz)  Body mass index is 31.59 kg/m².    Intake/Output Summary (Last 24 hours) at 9/5/2024 0756  Last data filed at 9/5/2024 0557  Gross per 24 hour   Intake 180 ml   Output 1150 ml   Net -970 ml         Physical Exam  Eyes:      Extraocular Movements: Extraocular movements intact.   Cardiovascular:      Rate and Rhythm: Normal rate.   Pulmonary:      Effort: No respiratory distress.      Breath sounds: No wheezing.   Musculoskeletal:      Right lower leg: Edema present.      Left lower leg: Edema present.      Comments: Swelling of both wrist, worse in the left   Neurological:      Mental Status: She is alert and oriented to person, place, and time.             Significant Labs: All pertinent labs within the past 24 hours have been reviewed.    Significant Imaging: I have reviewed all pertinent imaging results/findings within the past 24 hours.    Assessment/Plan:      * Acute on chronic systolic (congestive) heart failure  - BNP elevated  - CXR showed cardiomegaly and questionable pneumonia.  CT chest 9/4 showed chronic changes, no clear-cut pneumonia.  No fever, no cough, no longer on oxygen.  - Echo 9/4 EF 40-45%  - She was given a dose of IV Lasix 9/3 and 9/4  - Continue spironolactone 25 mg.  At home, she is on bumetanide 2 mg in the morning and 1 mg at lunchtime.  She states today that she has not been taking her noon dose of bumetanide at home lately  - Lower extremity swelling has improved since admit.  Consult case management for swing bed placement, [facility] near her house    Paroxysmal atrial fibrillation  - Diagnosed in 2015, had digoxin toxicity in 2016 and was in the ICU with heart rate in the 20s and 30s. At that  time, she was on digoxin, amiodarone, and Cardizem. Pacemaker placed in 2020, no longer on metoprolol  - Rate controlled  - Eliquis 2.5 mg    Chronic kidney disease (CKD), stage IV (severe)  - Nephrology following, renal ultrasound shows chronic disease  - Labs pending this morning    Primary hypertension  - Stable    Gout of multiple sites  - Bilateral wrist and shoulder pain, no recent trauma.  Likely gout flare-up  - She had allergy to allopurinol.  Give another dose of IV steroid    Hyperthyroidism  - On methimazole 2.5 mg daily  - TSH and free T4 normal      VTE Risk Mitigation (From admission, onward)           Ordered     apixaban tablet 2.5 mg  2 times daily         09/04/24 0744     IP VTE HIGH RISK PATIENT  Once         09/03/24 2159     Place sequential compression device  Until discontinued         09/03/24 2159                    Discharge Planning   SAMARIA:      Code Status: DNR   Is the patient medically ready for discharge?:     Reason for patient still in hospital (select all that apply): Treatment  Discharge Plan A: Home Health                  Saman Kearns II, MD  Department of Hospital Medicine   [facility] - 4th Floor Medical Telemetry

## 2024-09-05 NOTE — PROGRESS NOTES
Nephrology  Note    Patient Name: Baudilio Mantilla  Age: 89 y.o.  : 1932  MRN: [id number]  Admission Date: 9/3/2024        Baudilio Mantilla is a 89 y.o. female has been admitted to the hospital for shortness of breath PND orthopnea and pedal edema.  Patient reports that she does not have any history of any kidney problems of any kind.  No kidney stones no kidney infections no hematuria no hesitancy frequency urgency.  No dysuria.  She has been having pedal edema and has been sleeping in a recliner because difficulty breathing in the bed.  She remains weak and has lot of arthritis pains also.  Patient reports that she has poor appetite.  No nausea no vomiting.  Denies chest pains or abdominal pains.  Denies diarrhea constipation.  Denies fever or chills.  Her significant previous medical history has been gout, thyroid problem , osteoarthritis problem and atrial fibrillation.    2024   Patient is sitting on the side of the bed.  Physical therapy is about to be started.  Patient does have some difficulty talking clearly although does not have any respiratory distress.  No nausea or vomiting.    Current Facility-Administered Medications   Medication Dose Route Frequency Provider Last Rate Last Admin    apixaban tablet 2.5 mg  2.5 mg Oral BID Saman Kearns II, MD   2.5 mg at 24 0928    bumetanide tablet 2 mg  2 mg Oral Daily Saman Kearns II, MD   2 mg at 24 09    melatonin tablet 6 mg  6 mg Oral Nightly PRN Jarrod Blackburn IV, MD        methIMAzole split tablet 2.5 mg  2.5 mg Oral Daily Saman Kearns II, MD   2.5 mg at 24 09    metoprolol succinate (TOPROL-XL) 24 hr split tablet 12.5 mg  12.5 mg Oral Daily JoshMarina, NP   12.5 mg at 24 1205    miconazole NITRATE 2 % top powder   Topical (Top) BID Saman Kearns II, MD        ondansetron injection 4 mg  4 mg Intravenous Q8H PRN Jarrod Blackburn IV, MD        sertraline tablet 50 mg  50 mg Oral Daily Saman Kearns  MD RIGO   50 mg at 24 0929    sodium chloride 0.9% flush 10 mL  10 mL Intravenous PRN Jarrod Blackburn IV, MD        spironolactone tablet 25 mg  25 mg Oral Daily Saman Kearns II, MD   25 mg at 24 0928    traMADoL tablet 50 mg  50 mg Oral TID PRN Saman Kearns II, MD   50 mg at 24 0633       Saman Kearns II, MD    No past medical history on file.   Past Surgical History:   Procedure Laterality Date    CATARACT EXTRACTION, BILATERAL      FOOT SURGERY      MASTECTOMY Right     TOTAL KNEE ARTHROPLASTY Bilateral       Family History   Problem Relation Name Age of Onset    Diabetes Mother      Breast cancer Sister       Social History     Tobacco Use    Smoking status: Never    Smokeless tobacco: Never   Substance Use Topics    Alcohol use: Never     Medications Prior to Admission   Medication Sig Dispense Refill Last Dose    bumetanide (BUMEX) 1 MG tablet 2 tablets po q am, and 1 tablet po q noon. 90 tablet 11     colchicine (COLCRYS) 0.6 mg tablet Take 2 tablets now then 1 tablet daily for 6 days (Patient taking differently: Take 2 tablets now then 1 tablet daily for 6 days PRN gout flare ups) 10 tablet 6     diclofenac sodium (VOLTAREN) 1 % Gel SMARTSI Gram(s) Topical 4 Times Daily PRN       ELIQUIS 5 mg Tab Take 1 tablet (5 mg total) by mouth 2 (two) times daily. 60 tablet 11     ferrous sulfate (FEOSOL ORAL) Take 325 mg by mouth every Mon, Wed, Fri.       meclizine (ANTIVERT) 12.5 mg tablet Take 1 tablet (12.5 mg total) by mouth 3 (three) times daily as needed for Dizziness. 30 tablet 5     methIMAzole (TAPAZOLE) 5 MG Tab 0.5 tablet po daily. 15 tablet 11     nystatin (MYCOSTATIN) cream Apply topically 2 (two) times daily. for 7 days 30 g 2     pantoprazole (PROTONIX) 40 MG tablet Take 1 tablet (40 mg total) by mouth 2 (two) times daily. 60 tablet 11     sertraline (ZOLOFT) 50 MG tablet Take 1 tablet (50 mg total) by mouth once daily. 30 tablet 11     spironolactone (ALDACTONE) 25 MG  tablet Take 1 tablet (25 mg total) by mouth once daily. 30 tablet 11     traMADoL (ULTRAM) 50 mg tablet Take 1 tablet (50 mg total) by mouth 2 (two) times daily as needed for Pain. 30 tablet 3     triamcinolone acetonide 0.1% (KENALOG) 0.1 % ointment Apply topically 2 (two) times daily. 453.6 g 1      Review of patient's allergies indicates:   Allergen Reactions    Penicillin Rash            Review of Systems:     All 12 point review of system done and is negative except 1 history of present illness.    Objective:       VITAL SIGNS: 24 HR MIN & MAX LAST    Temp  Min: 97.3 °F (36.3 °C)  Max: 97.9 °F (36.6 °C)  97.7 °F (36.5 °C)        BP  Min: 97/54  Max: 146/71  (!) 97/54     Pulse  Min: 60  Max: 63  62     Resp  Min: 16  Max: 18  18    SpO2  Min: 93 %  Max: 99 %  (!) 93 %      GEN:  Moderately developed and nourished.  Awake alert oriented to time person place lying down in the bed.  Slept better last night with the oxygen.  HEENT:  Atraumatic normocephalic.  Pupils reactive.  Extraocular movements intact.  Dry mucous membranes in the mouth.  No other oral lesion.  No visible JVD noted.  No thyromegaly noted.  CV:  Irregularly irregular with heart rate about 60.  PULM:  Decreased breath sounds bibasilar area  ABD: Soft, NT/ND abdomen with NABS  EXT: No cyanosis, 2 to 3+ edema  SKIN: Warm and dry  PSYCH: Awake, alert, and appropriately conversant            Component Value Date/Time     09/04/2024 0351     (L) 09/03/2024 1417    K 3.7 09/04/2024 0351    K 4.2 09/03/2024 1417    CO2 23 09/04/2024 0351    CO2 23 09/03/2024 1417    BUN 79.0 (H) 09/04/2024 0351    BUN 78.3 (H) 09/03/2024 1417    CREATININE 1.79 (H) 09/04/2024 0351    CREATININE 2.02 (H) 09/03/2024 1417    CALCIUM 10.3 (H) 09/04/2024 0351    CALCIUM 10.3 (H) 09/03/2024 1417    PHOS 3.1 01/17/2018 0622            Component Value Date/Time    WBC 11.65 (H) 09/04/2024 0351    WBC 11.91 (H) 09/03/2024 1417    HGB 12.5 09/04/2024 0351    HGB 13.3  09/03/2024 1417    HCT 37.3 09/04/2024 0351    HCT 39.9 09/03/2024 1417     09/04/2024 0351     09/03/2024 1417         US Retroperitoneal Complete   Final Result      CT Chest Without Contrast   Final Result      Multilobar nodular ground-glass opacities within the lung suggest atypical infection.  Suggest six-month follow-up.      Mosaic attenuation of the lungs as can be seen with mosaic perfusion or air trapping.      Cardiomegaly.         Electronically signed by: Kitty Albarran   Date:    09/04/2024   Time:    08:18      X-Ray Chest AP Portable   Final Result      Cardiomegaly, questionable right lower lobe infiltrate         Electronically signed by: Oj Alonso MD   Date:    09/03/2024   Time:    22:31        Renal ultrasound reveals significantly echogenic kidneys suggestive of advanced chronic kidney disease.    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    Hyperglobulinemia will do workup, SPEP results pending.  Pedal edema unknown etiology.  Agree with the cardiac workup.    Recommendations  From the renal standpoint of view she does have advanced echogenic chronic kidney disease and no intervention is needed.    Need to avoid nephrotoxic agents.    Will check the results of SPEP.

## 2024-09-05 NOTE — CONSULTS
Consult received for low appetite, patient reported good appetite yesterday per nutrition assessment, nurse reports she ate about 50% of breakfast this morning, will add Boost Very High Calorie (provides 530 kcal, 22 g protein per serving) BID to supplement.

## 2024-09-05 NOTE — PT/OT/SLP EVAL
Physical Therapy Evaluation    Patient Name:  Baudilio Mantilla   MRN:  [id number]    Recommendations:     Discharge therapy intensity: Moderate Intensity Therapy   Discharge Equipment Recommendations: none   Barriers to discharge: Decreased caregiver support, Impaired mobility, and Ongoing medical needs    Assessment:     Baudilio Mantilla is a 89 y.o. female admitted with a medical diagnosis of CHF exac, paroxysmal atrial fibrillation, CKD, and gout. .  She presents with the following impairments/functional limitations: weakness, impaired endurance, impaired self care skills, impaired functional mobility, gait instability, impaired balance, decreased safety awareness, decreased lower extremity function, decreased upper extremity function. Pt tolerated eval well. Pt reports that she lives alone and was independent with ADLs and uses a RW and rollator in her home at baseline, but her son lives across the street. Pt does report having 5 falls within the past year due to LOB. She is currently requiring ModA for all functional mobility. Able to sidestep x1 ft along EOB but limited by fatigue. Pt would benefit from moderate intensity PT at d/c.    Rehab Prognosis: Good; patient would benefit from acute skilled PT services to address these deficits and reach maximum level of function.    Recent Surgery: * No surgery found *      Plan:     During this hospitalization, patient would benefit from acute PT services 5 x/week to address the identified rehab impairments via gait training, therapeutic activities, therapeutic exercises and progress toward the following goals:    Plan of Care Expires:  10/04/24    Subjective     Chief Complaint: none  Patient/Family Comments/goals: return to PLOF  Pain/Comfort:  Pain Rating 1: 0/10    Patients cultural, spiritual, Tenriism conflicts given the current situation: no    Living Environment:  Pt lives alone in [facility] with flat entrance. Son lives in house across the street.  Prior to admission,  "patients level of function was Joe.  Equipment used at home: walker, rolling, rollator, cane, straight, shower chair.  DME owned (not currently used): wheelchair.  Upon discharge, patient will have assistance from family.    Objective:     Communicated with NSG prior to session.  Patient found HOB elevated with PureWick, telemetry  upon PT entry to room.    General Precautions: Standard, fall  Orthopedic Precautions:N/A   Braces: N/A  Respiratory Status: Room air  Blood Pressure: 97/54 supine, 118/72 HR 73 sitting EOB. Began c/o dizziness after sitting EOB x 10mins but /83 HR 60      Exams:  RLE Strength: >2/5 grossly  LLE Strength: >2/5 grossly  Skin integrity: Visible skin intact      Functional Mobility:  Bed Mobility:     Scooting: moderate assistance  Supine to Sit: moderate assistance  Sit to Supine: moderate assistance  Transfers:     Sit to Stand:  moderate assistance and of 2 persons with rolling walker  Gait: Pt sidestepped x 1 ft towards HOB, difficulty clearing RLE. Pt states "sometimes I have to grab my jeans to get myself going when I walk at home." Distance limited by fatigue  Sitting balance: SBA      AM-PAC 6 CLICK MOBILITY  Total Score:        Treatment & Education:    Patient provided with verbal education education regarding PT role/goals/POC, fall prevention, safety awareness, and discharge/DME recommendations.  Understanding was verbalized.     Patient left HOB elevated with all lines intact, call button in reach, and NSG notified.    GOALS:   Multidisciplinary Problems       Physical Therapy Goals          Problem: Physical Therapy    Goal Priority Disciplines Outcome Goal Variances Interventions   Physical Therapy Goal     PT, PT/OT Progressing     Description: Goals to be met by: 10/5/24     Patient will increase functional independence with mobility by performin. Supine to sit with Modified [place]  2. Sit to supine with Modified [place]  3. Sit to stand transfer " with Stand-by Assistance  4. Bed to chair transfer with Stand-by Assistance using Rolling Walker  5. Gait  x 100 feet with Contact Guard Assistance using Rolling Walker.                          History:     No past medical history on file.    Past Surgical History:   Procedure Laterality Date    CATARACT EXTRACTION, BILATERAL      FOOT SURGERY      MASTECTOMY Right     TOTAL KNEE ARTHROPLASTY Bilateral        Time Tracking:     PT Received On: 09/05/24  PT Start Time: 1503     PT Stop Time: 1536  PT Total Time (min): 33 min     Billable Minutes: Evaluation 20 and Therapeutic Activity 13      09/05/2024

## 2024-09-05 NOTE — PLAN OF CARE
09/05/24 1607   Discharge Reassessment   Assessment Type Discharge Planning Reassessment   Did the patient's condition or plan change since previous assessment? Yes   Discharge Plan discussed with: Patient   Communicated SAMARIA with patient/caregiver Date not available/Unable to determine   Discharge Plan A Skilled Nursing Facility   Discharge Plan B Skilled Nursing Facility   Transition of Care Barriers None   Post-Acute Status   Post-Acute Authorization Placement   Post-Acute Placement Status Referrals Sent   Patient choice form signed by patient/caregiver List with quality metrics by geographic area provided     Spoke to patient about skilled nursing facility (SNF)/swing bed. Patient is agreeable. [place] of choice obtained for [facility]. Referral sent via [email or web address].

## 2024-09-05 NOTE — ASSESSMENT & PLAN NOTE
- Diagnosed in 2015, had digoxin toxicity in 2016 and was in the ICU with heart rate in the 20s and 30s. At that time, she was on digoxin, amiodarone, and Cardizem. Pacemaker placed in 2020, no longer on metoprolol  - Rate controlled  - Eliquis 2.5 mg

## 2024-09-05 NOTE — ASSESSMENT & PLAN NOTE
- BNP elevated  - CXR showed cardiomegaly and questionable pneumonia.  CT chest 9/4 showed chronic changes, no clear-cut pneumonia.  No fever, no cough, no longer on oxygen.  - Echo 9/4 EF 40-45%  - She was given a dose of IV Lasix 9/3 and 9/4  - Continue spironolactone 25 mg.  At home, she is on bumetanide 2 mg in the morning and 1 mg at lunchtime.  She states today that she has not been taking her noon dose of bumetanide at home lately  - Lower extremity swelling has improved since admit.  Consult case management for swing bed placement, [facility] [place] near her house

## 2024-09-05 NOTE — PLAN OF CARE
Problem: Physical Therapy  Goal: Physical Therapy Goal  Description: Goals to be met by: 10/5/24     Patient will increase functional independence with mobility by performin. Supine to sit with Modified [place]  2. Sit to supine with Modified [place]  3. Sit to stand transfer with Stand-by Assistance  4. Bed to chair transfer with Stand-by Assistance using Rolling Walker  5. Gait  x 100 feet with Contact Guard Assistance using Rolling Walker.     Outcome: Progressing

## 2024-09-05 NOTE — PLAN OF CARE
Problem: Occupational Therapy  Goal: Occupational Therapy Goal  Description: LTG: Pt will perform basic ADLs and ADL transfers with Modified independence using LRAD by discharge.    STG: to be met by 10/5/24    Pt will complete grooming standing at sink with LRAD with SBA.  Pt will complete UB dressing with SBA.  Pt will complete LB dressing with SBA using LRAD and AE prn.  Pt will complete toileting with SBA using LRAD.  Pt will complete functional mobility to/from toilet and toilet transfer with SBA using LRAD.   Outcome: Progressing

## 2024-09-05 NOTE — SUBJECTIVE & OBJECTIVE
Interval History:  Stable overnight.  Granddaughter at bedside.  No longer on oxygen.  Her main concern is ongoing left wrist pain related to gout.  It is somewhat improved with dose of IV steroid yesterday.    Review of Systems   Constitutional:  Negative for fever.   Respiratory:  Negative for cough.    Cardiovascular:  Negative for chest pain.   Musculoskeletal:  Positive for arthralgias.   Neurological:  Positive for weakness.     Objective:     Vital Signs (Most Recent):  Temp: 97.8 °F (36.6 °C) (09/05/24 0717)  Pulse: 60 (09/05/24 0717)  Resp: 18 (09/05/24 0717)  BP: 114/62 (09/05/24 0717)  SpO2: 95 % (09/05/24 0717) Vital Signs (24h Range):  Temp:  [97.5 °F (36.4 °C)-99.1 °F (37.3 °C)] 97.8 °F (36.6 °C)  Pulse:  [59-78] 60  Resp:  [16-18] 18  SpO2:  [93 %-99 %] 95 %  BP: (114-146)/(50-71) 114/62     Weight: 80.9 kg (178 lb 5.6 oz)  Body mass index is 31.59 kg/m².    Intake/Output Summary (Last 24 hours) at 9/5/2024 0756  Last data filed at 9/5/2024 0557  Gross per 24 hour   Intake 180 ml   Output 1150 ml   Net -970 ml         Physical Exam  Eyes:      Extraocular Movements: Extraocular movements intact.   Cardiovascular:      Rate and Rhythm: Normal rate.   Pulmonary:      Effort: No respiratory distress.      Breath sounds: No wheezing.   Musculoskeletal:      Right lower leg: Edema present.      Left lower leg: Edema present.      Comments: Swelling of both wrist, worse in the left   Neurological:      Mental Status: She is alert and oriented to person, place, and time.             Significant Labs: All pertinent labs within the past 24 hours have been reviewed.    Significant Imaging: I have reviewed all pertinent imaging results/findings within the past 24 hours.

## 2024-09-05 NOTE — PROGRESS NOTES
[facility] - 4th Floor Medical Telemetry  Wound Care    Patient Name:  Baudilio Mantilla   MRN:  [id number]  Date: 9/5/2024  Diagnosis: Acute on chronic systolic (congestive) heart failure    History:     No past medical history on file.    Social History     Socioeconomic History    Marital status:    Tobacco Use    Smoking status: Never    Smokeless tobacco: Never   Substance and Sexual Activity    Alcohol use: Never    Drug use: Never    Sexual activity: Never     Social Determinants of Health     Financial Resource Strain: Low Risk  (11/27/2023)    Overall Financial Resource Strain (CARDIA)     Difficulty of Paying Living Expenses: Not hard at all   Food Insecurity: No Food Insecurity (11/27/2023)    Hunger Vital Sign     Worried About Running Out of Food in the Last Year: Never true     Ran Out of Food in the Last Year: Never true   Transportation Needs: No Transportation Needs (11/27/2023)    PRAPARE - Transportation     Lack of Transportation (Medical): No     Lack of Transportation (Non-Medical): No   Physical Activity: Inactive (11/27/2023)    Exercise Vital Sign     Days of Exercise per Week: 0 days     Minutes of Exercise per Session: 0 min   Stress: No Stress Concern Present (11/27/2023)    [employer] - Occupational Stress Questionnaire     Feeling of Stress : Not at all   Housing Stability: Low Risk  (11/27/2023)    Housing Stability Vital Sign     Unable to Pay for Housing in the Last Year: No     Number of Places Lived in the Last Year: 1     Unstable Housing in the Last Year: No       Precautions:     Allergies as of 09/03/2024 - Reviewed 09/03/2024   Allergen Reaction Noted    Penicillin Rash 11/15/2022       [facility] Assessment Details/Treatment        09/05/24 1009   [facility] Assessment   Visit Date 09/05/24   Visit Time 1009   Consult Type New   [facility] Speciality Wound   Intervention chart review;assessed;applied;orders   Teaching on-going        Wound 09/04/24  1255 Pressure Injury Coccyx   Date First Assessed/Time First Assessed: 09/04/24 1255   Present on Original Admission: Yes  Primary Wound Type: Pressure Injury  Location: Coccyx  Is this injury device related?: No   Wound Image    Pressure Injury Stage DTPI   Dressing Appearance Dry;Intact   Drainage Amount None   Appearance Red;Maroon;Purple;Ecchymotic   Tissue loss description Not applicable   Black (%), Wound Tissue Color 0 %   Red (%), Wound Tissue Color 100 %   Yellow (%), Wound Tissue Color 0 %   Periwound Area Ecchymotic;Maroon;Purple   Wound Length (cm) 9.4 cm   Wound Width (cm) 10 cm   Wound Depth (cm) 0 cm   Wound Volume (cm^3) 0 cm^3   Wound Surface Area (cm^2) 94 cm^2   Care Cleansed with:;Antimicrobial agent   Dressing Applied;Foam;Non-adherent;Silicone     WOCN consulted for sacrum. Spoke with nurse, Sally. Pt is alone , admitted to facility with DTI to sacrum. Introduced myself and BREANNA Dailey to patient. Explained reason for visit. Removed present dressing to sacrum. Assessed, measured, photos taked for EMR. Treatment recommendations: Cleanse with soap and water. Pat dry. Apply sacral bordered foam gauze to be changed q 2 days and prn. Will order MARGARITA mattress, satya jose m. Educated pt on pressure relieving measures and off loading. Pt offloaded with wedge. Answered all questions. Nursing to continue with recommendations. Will follow up.      09/05/2024

## 2024-09-05 NOTE — NURSING
Nurses Note -- 4 Eyes      9/4/2024   7:00 PM      Skin assessed during: Daily Assessment      [] No Altered Skin Integrity Present    []Prevention Measures Documented      [x] Yes- Altered Skin Integrity Present or Discovered   [x] LDA Added if Not in Epic (Describe Wound)   [x] New Altered Skin Integrity was Present on Admit and Documented in LDA   [x] Wound Image Taken    Wound Care Consulted? Yes    Attending Nurse:  Shyann Brewer RN/Staff Member:  Citlalli

## 2024-09-05 NOTE — ASSESSMENT & PLAN NOTE
- Bilateral wrist and shoulder pain, no recent trauma.  Likely gout flare-up  - She had allergy to allopurinol.  Give another dose of IV steroid

## 2024-09-05 NOTE — PROGRESS NOTES
[facility] - 4th Floor Medical Telemetry    Cardiology  Progress Note    Patient Name: Baudilio Mantilla  MRN: [id number]  Admission Date: 9/3/2024  Hospital Length of Stay: 2 days  Code Status: DNR   Attending Physician: Saman Kearns II, MD   Primary Care Physician: Saman Kearns II, MD  Expected Discharge Date:   Principal Problem:Acute on chronic systolic (congestive) heart failure    Subjective:     Reason for Consult: CHF exacerbation      HPI: 89-year-old female that is known to [facility]/Dr. Vergara with a PMHx of AF (on Eliquis), SSS s/p PPM, pulmonary HTN, BLE edema, CKD stage III, HLD.  She presented to the ED with complaints of generalized weakness and SOB. She reports she sleeps in her chair and is unable to lay flat (she had and EF 50% in 2020 and has a hx of pulmonary hypertension) she is taking Bumex and spironolactone. She reported she has been unable to take her Bumex they las couple of days because she was unable to get up due to her arthritic pain . CXR showed cardiomegaly, She also had a BNP of 730.3. She was treated with IV lasix overnight, She reports SOB and orthopnea. [facility] was consulted for CHF.     Hospital Course:   9.5.24: Patient denies SOB, CP, or palpitations. She was net negative -970ml over the last 24 hours.  She has mild BLE edema       PMH: PAF (on Eliquis), SSS s/p PPM, pulmonary HTN, BLE edema, CKD stage III, HLD, hyperthyroidism, Gout, obesity  PSH: Cataract surgery, foot surgery, mastectomy, total knee replacement  Family History: Mother: DM Brother: DM Type II, CA, Sister: CA, Son: prediabetes  Social History: Denies ETOH, Tobacco, or illict drug use     Previous Cardiac Diagnostics:   ECHO (9.4.24):   Left Ventricle: The left ventricle is normal in size. Increased wall thickness. There is severe concentric hypertrophy. There is mildly reduced systolic function with a visually estimated ejection fraction of 40 - 45%. There is diastolic dysfunction but grade  cannot be determined.  Right Ventricle: Normal right ventricular cavity size. Systolic function is mildly reduced.  Left Atrium: Left atrium is severely dilated.  Right Atrium: Right atrium is moderately dilated.  Aortic Valve: Aortic valve peak velocity is 1.65 m/s. Mean gradient is 5 mmHg. There is mild aortic regurgitation.  Mitral Valve: There is mild mitral annular calcification present. The mean pressure gradient across the mitral valve is 2 mmHg at a heart rate of  bpm.  Pulmonary Artery: No pulmonary hypertension.  Pericardium: There is no pericardial effusion.     Limited ECHO (11.11.20):  Normal LV systolic function, LVEF 55-60%  Moderate to severe concentric LVH     LHC (10.5.20):   Normal coronaries arteries  Borderline LV function; EF 50% by LV gram   LVEDP 14mmHg  +2 MR  No aortic stenosis '  Severe pulmonary hypertension   PCW 18  PA 63/38 (45)  RV 72/3  RA 17  CO/CI 3.5/1.8     PET (9.25.20):  This is an abnormal perfusion study. Study is consistent with ischemia.   This scan is suggestive of moderate risk for future cardiovascular events.   Small reversible perfusion abnormality of moderate intensity in the anterior septal region.   Perfusion imaging is suggestive of single vessel disease. Perfusion defect is in the distribution of left anterior descending artery.   The left ventricular cavity is noted to be mildly enlarged on the stress studies. The stress left ventricular ejection fraction was calculated to be 32% and left ventricular global function is moderately reduced. The rest left ventricular cavity is noted to be mildly enlarged. The rest left ventricular ejection fraction was calculated to be 57% and rest left ventricular global function is normal.   When compared to the resting ejection fraction (57%), the stress ejection fraction (32%) has decreased.   Transient ischemic dilatation is present and has been described as a marker for high risk coronary artery disease. It is most likely due  to sub-endocardial ischemia, it has also been described in microvascular disease as well as cardiac deconditioning.   The study quality is good.      ECHO (9.25.20):  The study quality is average.   The left ventricle is decreased in size. The left ventricular ejection fraction is 50%. Noted left ventricular hypertrophy. It is severe. Right ventricular systolic function is mildly decreased.  Mild to moderate (1-2+) mitral regurgitation.   Mild to moderate (1-2+) tricuspid regurgitation.  The pulmonary artery systolic pressure is 53 mmHg.     Review of Systems   Constitutional: Negative for chills and fever.   Cardiovascular:  Positive for leg swelling. Negative for chest pain and palpitations.   Respiratory:  Negative for cough, shortness of breath and wheezing.      Objective:     Vital Signs (Most Recent):  Temp: 97.8 °F (36.6 °C) (09/05/24 0717)  Pulse: 60 (09/05/24 0717)  Resp: 18 (09/05/24 0717)  BP: 114/62 (09/05/24 0717)  SpO2: 95 % (09/05/24 0717) Vital Signs (24h Range):  Temp:  [97.5 °F (36.4 °C)-99.1 °F (37.3 °C)] 97.8 °F (36.6 °C)  Pulse:  [59-78] 60  Resp:  [16-18] 18  SpO2:  [93 %-99 %] 95 %  BP: (114-146)/(50-71) 114/62   Weight: 80.9 kg (178 lb 5.6 oz)  Body mass index is 31.59 kg/m².  SpO2: 95 %       Intake/Output Summary (Last 24 hours) at 9/5/2024 0746  Last data filed at 9/5/2024 0557  Gross per 24 hour   Intake 180 ml   Output 1150 ml   Net -970 ml     Lines/Drains/Airways       Peripheral Intravenous Line  Duration                  Peripheral IV - Single Lumen 09/03/24 1807 20 G Left Wrist 1 day                    Significant Labs:   Chemistries:   Recent Labs   Lab 09/03/24  1417 09/03/24  2208 09/04/24  0351   *  --  136   K 4.2  --  3.7   CL 98  --  101   CO2 23  --  23   BUN 78.3*  --  79.0*   CREATININE 2.02*  --  1.79*   CALCIUM 10.3*  --  10.3*   BILITOT 1.4  --   --    ALKPHOS 87  --   --    ALT 7  --   --    AST 15  --   --    GLUCOSE 149*  --  101   MG 2.10 2.10  --   "  TROPONINI 0.246*  --   --         CBC/Anemia Labs: Coags:    Recent Labs   Lab 09/03/24  1417 09/04/24  0351   WBC 11.91* 11.65*   HGB 13.3 12.5   HCT 39.9 37.3    188   MCV 95.7* 94.2*   RDW 14.1 14.0    No results for input(s): "PT", "INR", "APTT" in the last 168 hours.     Telemetry:  NSR    Physical Exam  Constitutional:       General: She is not in acute distress.     Appearance: Normal appearance.   HENT:      Head: Normocephalic.      Mouth/Throat:      Mouth: Mucous membranes are moist.   Cardiovascular:      Rate and Rhythm: Normal rate and regular rhythm.      Pulses: Normal pulses.      Heart sounds: Normal heart sounds. No murmur heard.  Pulmonary:      Effort: Pulmonary effort is normal. No respiratory distress.      Breath sounds: Normal breath sounds.   Musculoskeletal:      Right lower leg: Edema present.      Left lower leg: Edema present.   Neurological:      Mental Status: She is alert. Mental status is at baseline.         Current Schedule Inpatient Medications:   apixaban  2.5 mg Oral BID    bumetanide  1 mg Oral Daily    methIMAzole  2.5 mg Oral Daily    sertraline  50 mg Oral Daily    spironolactone  25 mg Oral Daily     Continuous Infusions:      Assessment:   Elevated BNP    - ECHO (9.4.24): LVEF 40-45% There is severe concentric hypertrophy.    - Elevated .3   - ECHO (11.11.20): LVEF 55-60%  Pulmonary HTN  PAF (on Eliquis)   - [id number] = 4 point   - on Eliquis 2.5mg BID (due to age & weight)  SSS s/p PPM  CKD stage III  HLD  Hyperthyroidism  Gout  obesity      Plan:   D/C IV Lasix   ECHO reviewed mildly decreased EF 40-45%  Start PO Bumex (home medication)   Cont. Spironolactone   Cont. Eliquis 2.5mg BID for CVA risk reduction (reduced dose due to age and weight)   Add Metoprolol Succinate for GDMT  Obtain accurate I&Os and daily weights   AM labs: CBC, CMP, Mag          Marina Moreno, NP  Cardiology  [facility] - 4th Floor Medical Telemetry    "

## 2024-09-05 NOTE — NURSING
Nurses Note -- 4 Eyes      9/5/2024   12:52 PM      Skin assessed during: Q Shift Change      [] No Altered Skin Integrity Present    [x]Prevention Measures Documented      [x] Yes- Altered Skin Integrity Present or Discovered   [] LDA Added if Not in Epic (Describe Wound)   [x] New Altered Skin Integrity was Present on Admit and Documented in LDA   [x] Wound Image Taken    Wound Care Consulted? Yes    Attending Nurse:  Sally Brewer RN/Staff Member:  RONIT Boothe

## 2024-09-05 NOTE — PT/OT/SLP EVAL
Occupational Therapy  Evaluation    Name: Baudilio Mantilla  MRN: [id number]  Admitting Diagnosis: shoulder pain   Recent Surgery: * No surgery found *      Recommendations:     Discharge therapy intensity: Moderate Intensity Therapy   Discharge Equipment Recommendations:  none  Barriers to discharge:  Other (Comment) (ongoing medical needs)    Assessment:     Baudilio Mantilla is a 89 y.o. female with a medical diagnosis of acute on chronic CHF, paroxysmal atrial fibrillation, CKD, and gout. She is A&Ox3 and tolerated OT evaluation fairly well. She presents with the following performance deficits affecting function: weakness, impaired endurance, impaired self care skills, impaired functional mobility, gait instability, impaired balance, pain, decreased safety awareness, decreased lower extremity function, decreased upper extremity function. She required mod A for bed mobility and mod A x2 for sit<>stand using rolling walker. Pt able to take x1 lateral step towards HOB before returning to sitting EOB due to fatigue. She presents anxious with mobility due to fear of falling. Pt reports living alone and reports being independent with ADLs prior. Recommend moderate intensity therapy upon d/c.      Rehab Prognosis: Good; patient would benefit from acute skilled OT services to address these deficits and reach maximum level of function.       Plan:     Patient to be seen 5 x/week to address the above listed problems via self-care/home management, therapeutic activities, therapeutic exercises  Plan of Care Expires: 10/05/24  Plan of Care Reviewed with: patient    Subjective     Chief Complaint: BUE pain  Patient/Family Comments/goals: to return home     Occupational Profile:  Living Environment: Pt reports living alone in a single level home with no steps to enter. Pt has a walk-in shower with a shower chair.   Previous level of function: Pt reports being independent with ADLs prior.   Roles and Routines: mother  Equipment Used at  Home: walker, rolling, rollator, cane, straight, shower chair  Assistance upon Discharge: Pt will have assist from her family upon d/c.     Pain/Comfort:  Pain Rating 1: other (see comments) (verbalized BUE pain but did not rate)  Pain Addressed 1: Pre-medicate for activity, Reposition, Nurse notified    Patients cultural, spiritual, Cheondoism conflicts given the current situation: no    Objective:     OT communicated with RN prior to session.      Patient was found HOB elevated with peripheral IV, PureWick, telemetry upon OT entry to room.    General Precautions: Standard, fall  Orthopedic Precautions: N/A  Braces: N/A    Vital Signs: HR: 80-90 at rest, 100-134 with activity   Respiratory Status: on room air    Bed Mobility:    Patient completed Scooting/Bridging with moderate assistance  Patient completed Supine to Sit with moderate assistance  Patient completed Sit to Supine with moderate assistance    Functional Mobility/Transfers:  Patient completed Sit <> Stand Transfer with moderate assistance and of x2 persons  with  rolling walker   Functional Mobility: pt able to take x1 lateral step towards HOB before returning to sitting EOB due to fatigue.     Activities of Daily Living:  Lower Body Dressing: maximal assistance to don socks and shoes while sitting EOB.     AMPAC 6 Click ADL:  AMPAC Total Score: 14    Functional Cognition:  Orientation: oriented to Person, Place, and Time  Safety Awareness: Impaired.      Visual Perceptual Skills:  Intact    Upper Extremity Function:  Right Upper Extremity:   Global weakness noted; PROM WFL    Left Upper Extremity:  Global weakness noted; PROM WFL    Balance:   Static sitting balance: CGA  Static standing balance:mod A    Therapeutic Positioning  Risk for acquired pressure injuries is increased due to relative decrease in mobility d/t hospitalization  and impaired mobility.    OT interventions performed during the course of today's session:   Positioning recommendations  were communicated to care team     Skin assessment: all bony prominences were assessed    Findings:  Visible skin intact.     OT recommendations for therapeutic positioning throughout hospitalization:   Follow [facility] Pressure Injury Prevention Protocol    Patient Education:  Patient provided with verbal education education regarding OT role/goals/POC, fall prevention, safety awareness, Discharge/DME recommendations, and pressure ulcer prevention.  Understanding was verbalized.     Patient left HOB elevated with all lines intact, call button in reach, and RN notified.    GOALS:   Multidisciplinary Problems       Occupational Therapy Goals          Problem: Occupational Therapy    Goal Priority Disciplines Outcome Interventions   Occupational Therapy Goal     OT, PT/OT Progressing    Description: LTG: Pt will perform basic ADLs and ADL transfers with Modified independence using LRAD by discharge.    STG: to be met by 10/5/24    Pt will complete grooming standing at sink with LRAD with SBA.  Pt will complete UB dressing with SBA.  Pt will complete LB dressing with SBA using LRAD and AE prn.  Pt will complete toileting with SBA using LRAD.  Pt will complete functional mobility to/from toilet and toilet transfer with SBA using LRAD.                        History:     No past medical history on file.      Past Surgical History:   Procedure Laterality Date    CATARACT EXTRACTION, BILATERAL      FOOT SURGERY      MASTECTOMY Right     TOTAL KNEE ARTHROPLASTY Bilateral        Time Tracking:     OT Date of Treatment: 09/05/24  OT Start Time: 1314  OT Stop Time: 1342  OT Total Time (min): 28 min    Billable Minutes:Evaluation Moderate Complexity.     9/5/2024

## 2024-09-06 LAB
ALBUMIN SERPL-MCNC: 2.6 G/DL (ref 3.4–4.8)
ALBUMIN/GLOB SERPL: 0.8 RATIO (ref 1.1–2)
ALP SERPL-CCNC: 77 UNIT/L (ref 40–150)
ALT SERPL-CCNC: 11 UNIT/L (ref 0–55)
ANION GAP SERPL CALC-SCNC: 9 MEQ/L
AST SERPL-CCNC: 19 UNIT/L (ref 5–34)
BASOPHILS # BLD AUTO: 0.01 X10(3)/MCL
BASOPHILS NFR BLD AUTO: 0.1 %
BILIRUB SERPL-MCNC: 0.6 MG/DL
BUN SERPL-MCNC: 84.3 MG/DL (ref 9.8–20.1)
CALCIUM SERPL-MCNC: 10 MG/DL (ref 8.4–10.2)
CHLORIDE SERPL-SCNC: 102 MMOL/L (ref 98–111)
CO2 SERPL-SCNC: 24 MMOL/L (ref 23–31)
CREAT SERPL-MCNC: 1.48 MG/DL (ref 0.55–1.02)
CREAT/UREA NIT SERPL: 57
EOSINOPHIL # BLD AUTO: 0 X10(3)/MCL (ref 0–0.9)
EOSINOPHIL NFR BLD AUTO: 0 %
ERYTHROCYTE [DISTWIDTH] IN BLOOD BY AUTOMATED COUNT: 14 % (ref 11.5–17)
GFR SERPLBLD CREATININE-BSD FMLA CKD-EPI: 33 ML/MIN/1.73/M2
GLOBULIN SER-MCNC: 3.4 GM/DL (ref 2.4–3.5)
GLUCOSE SERPL-MCNC: 131 MG/DL (ref 75–121)
HCT VFR BLD AUTO: 37.2 % (ref 37–47)
HGB BLD-MCNC: 12.3 G/DL (ref 12–16)
IGA SERPL-MCNC: 299 MG/DL (ref 69–517)
IGG SERPL-MCNC: 1279 MG/DL (ref 522–1631)
IGM SERPL-MCNC: 42 MG/DL (ref 33–293)
IMM GRANULOCYTES # BLD AUTO: 0.06 X10(3)/MCL (ref 0–0.04)
IMM GRANULOCYTES NFR BLD AUTO: 0.6 %
LYMPHOCYTES # BLD AUTO: 0.79 X10(3)/MCL (ref 0.6–4.6)
LYMPHOCYTES NFR BLD AUTO: 8.5 %
MAGNESIUM SERPL-MCNC: 2.4 MG/DL (ref 1.6–2.6)
MCH RBC QN AUTO: 31 PG (ref 27–31)
MCHC RBC AUTO-ENTMCNC: 33.1 G/DL (ref 33–36)
MCV RBC AUTO: 93.7 FL (ref 80–94)
MONOCYTES # BLD AUTO: 0.74 X10(3)/MCL (ref 0.1–1.3)
MONOCYTES NFR BLD AUTO: 8 %
NEUTROPHILS # BLD AUTO: 7.65 X10(3)/MCL (ref 2.1–9.2)
NEUTROPHILS NFR BLD AUTO: 82.8 %
NRBC BLD AUTO-RTO: 0 %
PLATELET # BLD AUTO: 233 X10(3)/MCL (ref 130–400)
PMV BLD AUTO: 10.8 FL (ref 7.4–10.4)
POTASSIUM SERPL-SCNC: 4.2 MMOL/L (ref 3.5–5.1)
PROT SERPL-MCNC: 6 GM/DL (ref 5.8–7.6)
RBC # BLD AUTO: 3.97 X10(6)/MCL (ref 4.2–5.4)
SODIUM SERPL-SCNC: 135 MMOL/L (ref 136–145)
WBC # BLD AUTO: 9.25 X10(3)/MCL (ref 4.5–11.5)

## 2024-09-06 PROCEDURE — 83735 ASSAY OF MAGNESIUM: CPT | Performed by: NURSE PRACTITIONER

## 2024-09-06 PROCEDURE — 82784 ASSAY IGA/IGD/IGG/IGM EACH: CPT | Performed by: INTERNAL MEDICINE

## 2024-09-06 PROCEDURE — 97110 THERAPEUTIC EXERCISES: CPT | Mod: CQ

## 2024-09-06 PROCEDURE — 97530 THERAPEUTIC ACTIVITIES: CPT | Mod: CQ

## 2024-09-06 PROCEDURE — 63600175 PHARM REV CODE 636 W HCPCS: Performed by: INTERNAL MEDICINE

## 2024-09-06 PROCEDURE — 25000003 PHARM REV CODE 250: Performed by: NURSE PRACTITIONER

## 2024-09-06 PROCEDURE — 80053 COMPREHEN METABOLIC PANEL: CPT | Performed by: INTERNAL MEDICINE

## 2024-09-06 PROCEDURE — 99900031 HC PATIENT EDUCATION (STAT)

## 2024-09-06 PROCEDURE — 21400001 HC TELEMETRY ROOM

## 2024-09-06 PROCEDURE — 85025 COMPLETE CBC W/AUTO DIFF WBC: CPT | Performed by: INTERNAL MEDICINE

## 2024-09-06 PROCEDURE — 86334 IMMUNOFIX E-PHORESIS SERUM: CPT | Performed by: INTERNAL MEDICINE

## 2024-09-06 PROCEDURE — 25000003 PHARM REV CODE 250: Performed by: INTERNAL MEDICINE

## 2024-09-06 PROCEDURE — 36415 COLL VENOUS BLD VENIPUNCTURE: CPT | Performed by: INTERNAL MEDICINE

## 2024-09-06 PROCEDURE — 99900035 HC TECH TIME PER 15 MIN (STAT)

## 2024-09-06 PROCEDURE — 99232 SBSQ HOSP IP/OBS MODERATE 35: CPT | Mod: ,,, | Performed by: INTERNAL MEDICINE

## 2024-09-06 PROCEDURE — 27000221 HC OXYGEN, UP TO 24 HOURS

## 2024-09-06 PROCEDURE — 83521 IG LIGHT CHAINS FREE EACH: CPT | Performed by: INTERNAL MEDICINE

## 2024-09-06 PROCEDURE — 97535 SELF CARE MNGMENT TRAINING: CPT

## 2024-09-06 RX ORDER — BUMETANIDE 1 MG/1
1 TABLET ORAL
Status: DISCONTINUED | OUTPATIENT
Start: 2024-09-07 | End: 2024-09-09

## 2024-09-06 RX ORDER — COLCHICINE 0.6 MG/1
0.6 TABLET, FILM COATED ORAL DAILY
Status: DISCONTINUED | OUTPATIENT
Start: 2024-09-06 | End: 2024-09-10 | Stop reason: HOSPADM

## 2024-09-06 RX ADMIN — COLCHICINE 0.6 MG: 0.6 CAPSULE ORAL at 09:09

## 2024-09-06 RX ADMIN — MICONAZOLE NITRATE 2 % TOPICAL POWDER: at 08:09

## 2024-09-06 RX ADMIN — BUMETANIDE 2 MG: 1 TABLET ORAL at 09:09

## 2024-09-06 RX ADMIN — METHYLPREDNISOLONE SODIUM SUCCINATE 80 MG: 40 INJECTION, POWDER, FOR SOLUTION INTRAMUSCULAR; INTRAVENOUS at 09:09

## 2024-09-06 RX ADMIN — METOPROLOL SUCCINATE 12.5 MG: 25 TABLET, EXTENDED RELEASE ORAL at 09:09

## 2024-09-06 RX ADMIN — MICONAZOLE NITRATE 2 % TOPICAL POWDER: at 09:09

## 2024-09-06 RX ADMIN — APIXABAN 2.5 MG: 2.5 TABLET, FILM COATED ORAL at 09:09

## 2024-09-06 RX ADMIN — SPIRONOLACTONE 25 MG: 25 TABLET ORAL at 09:09

## 2024-09-06 RX ADMIN — APIXABAN 2.5 MG: 2.5 TABLET, FILM COATED ORAL at 08:09

## 2024-09-06 RX ADMIN — METHIMAZOLE 2.5 MG: 5 TABLET ORAL at 09:09

## 2024-09-06 RX ADMIN — SERTRALINE HYDROCHLORIDE 50 MG: 50 TABLET ORAL at 09:09

## 2024-09-06 NOTE — ASSESSMENT & PLAN NOTE
- Nephrology following, renal ultrasound shows chronic disease  - Creatinine improving; BUN above 80 (received steroids past few days)

## 2024-09-06 NOTE — PT/OT/SLP PROGRESS
Occupational Therapy   Treatment    Name: Baudilio Mantilla  MRN: [id number]  Admitting Diagnosis:  Acute on chronic systolic (congestive) heart failure       Recommendations:     Recommended therapy intensity at discharge: Moderate Intensity Therapy   Discharge Equipment Recommendations:  to be determined by next level of care  Barriers to discharge:   (ongoing medical needs)    Assessment:     Baudilio Mantilla is a 89 y.o. female with a medical diagnosis of acute on chronic CHF, paroxysmal atrial fibrillation, CKD, and gout. She presents with extreme pain in L wrist -- awaiting xray results. Performance deficits affecting function are weakness, impaired endurance, impaired self care skills, impaired functional mobility, gait instability, impaired balance, pain, decreased safety awareness, decreased lower extremity function, decreased upper extremity function. Use of RW deferred today due to pain and swelling in L wrist.    Rehab Prognosis:  Good; patient would benefit from acute skilled OT services to address these deficits and reach maximum level of function.       Plan:     Patient to be seen 5 x/week to address the above listed problems via self-care/home management, therapeutic activities, therapeutic exercises  Plan of Care Expires: 10/05/24  Plan of Care Reviewed with: patient    Subjective     Pain/Comfort:  Location - Side 1: Left  Location - Orientation 1: lateral  Location 1: wrist  Pain Addressed 1: Reposition, Distraction, Cessation of Activity, Nurse notified    Objective:     Communicated with: nurse prior to session.  Patient found HOB elevated with peripheral IV, telemetry upon OT entry to room.    General Precautions: Standard, fall    Orthopedic Precautions:N/A  Braces: N/A  Respiratory Status: Room air     Occupational Performance:     Bed Mobility:    Patient completed Supine to Sit with maximal assistance  Patient completed Sit to Supine with maximal assistance     Functional  Mobility/Transfers:  Patient completed Sit <> Stand Transfer with maximal assistance and of 2 persons  with  hand-held assist b/l knee blocking  Functional Mobility: attempted to stand x2 for few seconds    Activities of Daily Living:  Grooming: minimum assistance combing hair ROM limited in BUE to reach back of head; states oral care already performed and not interested in washing face  Lower Body Dressing: total assistance adjusting socks and donning shoes    Patient Education:  Patient provided with verbal education education regarding OT role/goals/POC, fall prevention, safety awareness, and Discharge/DME recommendations.  Understanding was verbalized, however additional teaching warranted.      Patient left left sidelying with all lines intact, call button in reach, wedge under R side, nurse notified, and family member present.    GOALS:   Multidisciplinary Problems       Occupational Therapy Goals          Problem: Occupational Therapy    Goal Priority Disciplines Outcome Interventions   Occupational Therapy Goal     OT, PT/OT Progressing    Description: LTG: Pt will perform basic ADLs and ADL transfers with Modified independence using LRAD by discharge.    STG: to be met by 10/5/24    Pt will complete grooming standing at sink with LRAD with SBA.  Pt will complete UB dressing with SBA.  Pt will complete LB dressing with SBA using LRAD and AE prn.  Pt will complete toileting with SBA using LRAD.  Pt will complete functional mobility to/from toilet and toilet transfer with SBA using LRAD.                        Time Tracking:     OT Date of Treatment: 09/06/24  OT Start Time: 1024  OT Stop Time: 1055  OT Total Time (min): 31 min    Billable Minutes:Self Care/Home Management 31    OT/KATHY: OT     Number of KATHY visits since last OT visit: 1    9/6/2024

## 2024-09-06 NOTE — PROGRESS NOTES
[facility] - 4th Floor [facility]  Progress Note    Patient Name: Baudilio Mantilla  MRN: [id number]  Patient Class: IP- Inpatient   Admission Date: 9/3/2024  Length of Stay: 3 days  Attending Physician: Saman Kearns II, MD  Primary Care Provider: Saman Kearns II, MD        Subjective:     Principal Problem:Acute on chronic systolic (congestive) heart failure        HPI:  89 year old white female with history of CHF presented to the ED 9/3 for generalized weakness and SOB. Patient sleeps in her recliner because of CHF and was not able to get out of the chair due to generalized weakness. Patient also reports bilateral shoulder and wrist pain over the past 1-2 weeks. She is on Bumex and spironolactone and has been compliant with her medications. She reports worsening leg swelling and SOB.  BNP was elevated, and chest x-ray showed cardiomegaly.  She was given a dose of IV Lasix last night.  She is currently on 1 L of oxygen with oxygen saturation of 98%.    She has a past medical history of chronic systolic heart failure, paroxysmal atrial fibrillation, gout, hypertension, hyperparathyroidism among other conditions.  She lives alone.  She denies alcohol, tobacco, or drug use.  See below for more details of social history, family history, past medical history etc    Overview/Hospital Course:  No notes on file    Interval History: Stable overnight, L wrist pain persistent, R wrist pain nearly resolved    Review of Systems   Constitutional:  Negative for fever.   Respiratory:  Negative for cough and shortness of breath.    Cardiovascular:  Negative for chest pain.   Musculoskeletal:  Positive for arthralgias and joint swelling.     Objective:     Vital Signs (Most Recent):  Temp: 97.5 °F (36.4 °C) (09/06/24 0719)  Pulse: 63 (09/06/24 0719)  Resp: 20 (09/06/24 0719)  BP: 131/70 (09/06/24 0719)  SpO2: (!) 94 % (09/06/24 0719) Vital Signs (24h Range):  Temp:  [97.3 °F (36.3  °C)-97.8 °F (36.6 °C)] 97.5 °F (36.4 °C)  Pulse:  [62-83] 63  Resp:  [18-20] 20  SpO2:  [93 %-97 %] 94 %  BP: ()/(54-87) 131/70     Weight: 81.3 kg (179 lb 3.7 oz)  Body mass index is 31.75 kg/m².    Intake/Output Summary (Last 24 hours) at 9/6/2024 0806  Last data filed at 9/6/2024 0748  Gross per 24 hour   Intake 1080 ml   Output 2000 ml   Net -920 ml         Physical Exam  Constitutional:       General: She is not in acute distress.  Eyes:      Extraocular Movements: Extraocular movements intact.   Cardiovascular:      Rate and Rhythm: Normal rate. Rhythm irregular.   Pulmonary:      Effort: No respiratory distress.      Breath sounds: No wheezing.   Musculoskeletal:      Right lower leg: Edema present.      Left lower leg: Edema present.      Comments: L wrist TTP and swelling   Neurological:      Mental Status: She is alert.             Significant Labs: All pertinent labs within the past 24 hours have been reviewed.    Significant Imaging: I have reviewed all pertinent imaging results/findings within the past 24 hours.    Assessment/Plan:      * Acute on chronic systolic (congestive) heart failure  - BNP elevated  - CXR showed cardiomegaly and questionable pneumonia.  CT chest 9/4 showed chronic changes, no clear-cut pneumonia.  No fever, no cough, no longer on oxygen.  - Echo 9/4 EF 40-45%  - She was given a dose of IV Lasix 9/3 and 9/4  - Continue spironolactone 25 mg.  At home, she is on bumetanide 2 mg in the morning and 1 mg at lunchtime.  She states today that she has not been taking her noon dose of bumetanide at home lately  - Lower extremity swelling has improved since admit.  Consulted case management for swing bed placement, [facility] near her house    Paroxysmal atrial fibrillation  - Diagnosed in 2015, had digoxin toxicity in 2016 and was in the ICU with heart rate in the 20s and 30s. At that time, she was on digoxin, amiodarone, and Cardizem. Pacemaker placed in 2020, no longer  on metoprolol  - Rate controlled  - Eliquis 2.5 mg    Chronic kidney disease (CKD), stage IV (severe)  - Nephrology following, renal ultrasound shows chronic disease  - Creatinine improving; BUN above 80 (received steroids past few days)    Primary hypertension  - Stable    Gout of multiple sites  - Bilateral wrist and shoulder pain, no recent trauma.  Likely gout flare-up  - She had allergy to allopurinol.  Give another dose of IV steroid (day #3), start colchicine, X-ray L wrist    Hyperthyroidism  - On methimazole 2.5 mg daily  - TSH and free T4 normal      VTE Risk Mitigation (From admission, onward)           Ordered     apixaban tablet 2.5 mg  2 times daily         09/04/24 0744     IP VTE HIGH RISK PATIENT  Once         09/03/24 2159     Place sequential compression device  Until discontinued         09/03/24 2159                    Discharge Planning   SAMARIA:      Code Status: DNR   Is the patient medically ready for discharge?:     Reason for patient still in hospital (select all that apply): Treatment  Discharge Plan A: Skilled Nursing Facility                  Saman Kearns II, MD  Department of Hospital Medicine   [facility] - 4th Floor Medical Telemetry

## 2024-09-06 NOTE — PROGRESS NOTES
[facility] - 4th Floor Medical Telemetry    Cardiology  Progress Note    Patient Name: Baudilio Mantilla  MRN: [id number]  Admission Date: 9/3/2024  Hospital Length of Stay: 3 days  Code Status: DNR   Attending Physician: Saman Kearns II, MD   Primary Care Physician: Saman Kearns II, MD  Expected Discharge Date:   Principal Problem:Acute on chronic systolic (congestive) heart failure    Subjective:     Reason for Consult: CHF exacerbation      HPI: 89-year-old female that is known to [facility]/Dr. Vergara with a PMHx of AF (on Eliquis), SSS s/p PPM, pulmonary HTN, BLE edema, CKD stage III, HLD.  She presented to the ED with complaints of generalized weakness and SOB. She reports she sleeps in her chair and is unable to lay flat (she had and EF 50% in 2020 and has a hx of pulmonary hypertension) she is taking Bumex and spironolactone. She reported she has been unable to take her Bumex they las couple of days because she was unable to get up due to her arthritic pain . CXR showed cardiomegaly, She also had a BNP of 730.3. She was treated with IV lasix overnight, She reports SOB and orthopnea. [facility] was consulted for CHF.     Hospital Course:   9.5.24: Patient denies SOB, CP, or palpitations. She was net negative -970ml over the last 24 hours.  She has mild BLE edema   9.6.24: Patient seen laying in bed. She denies SOB, CP, or palpitations. She repots no SOB with ambulation. Net negative 700ml      PMH: PAF (on Eliquis), SSS s/p PPM, pulmonary HTN, BLE edema, CKD stage III, HLD, hyperthyroidism, Gout, obesity  PSH: Cataract surgery, foot surgery, mastectomy, total knee replacement  Family History: Mother: DM Brother: DM Type II, CA, Sister: CA, Son: prediabetes  Social History: Denies ETOH, Tobacco, or illict drug use     Previous Cardiac Diagnostics:   ECHO (9.4.24):   Left Ventricle: The left ventricle is normal in size. Increased wall thickness. There is severe concentric hypertrophy. There is mildly  reduced systolic function with a visually estimated ejection fraction of 40 - 45%. There is diastolic dysfunction but grade cannot be determined.  Right Ventricle: Normal right ventricular cavity size. Systolic function is mildly reduced.  Left Atrium: Left atrium is severely dilated.  Right Atrium: Right atrium is moderately dilated.  Aortic Valve: Aortic valve peak velocity is 1.65 m/s. Mean gradient is 5 mmHg. There is mild aortic regurgitation.  Mitral Valve: There is mild mitral annular calcification present. The mean pressure gradient across the mitral valve is 2 mmHg at a heart rate of  bpm.  Pulmonary Artery: No pulmonary hypertension.  Pericardium: There is no pericardial effusion.     Limited ECHO (11.11.20):  Normal LV systolic function, LVEF 55-60%  Moderate to severe concentric LVH     LHC (10.5.20):   Normal coronaries arteries  Borderline LV function; EF 50% by LV gram   LVEDP 14mmHg  +2 MR  No aortic stenosis '  Severe pulmonary hypertension   PCW 18  PA 63/38 (45)  RV 72/3  RA 17  CO/CI 3.5/1.8     PET (9.25.20):  This is an abnormal perfusion study. Study is consistent with ischemia.   This scan is suggestive of moderate risk for future cardiovascular events.   Small reversible perfusion abnormality of moderate intensity in the anterior septal region.   Perfusion imaging is suggestive of single vessel disease. Perfusion defect is in the distribution of left anterior descending artery.   The left ventricular cavity is noted to be mildly enlarged on the stress studies. The stress left ventricular ejection fraction was calculated to be 32% and left ventricular global function is moderately reduced. The rest left ventricular cavity is noted to be mildly enlarged. The rest left ventricular ejection fraction was calculated to be 57% and rest left ventricular global function is normal.   When compared to the resting ejection fraction (57%), the stress ejection fraction (32%) has decreased.   Transient  ischemic dilatation is present and has been described as a marker for high risk coronary artery disease. It is most likely due to sub-endocardial ischemia, it has also been described in microvascular disease as well as cardiac deconditioning.   The study quality is good.      ECHO (9.25.20):  The study quality is average.   The left ventricle is decreased in size. The left ventricular ejection fraction is 50%. Noted left ventricular hypertrophy. It is severe. Right ventricular systolic function is mildly decreased.  Mild to moderate (1-2+) mitral regurgitation.   Mild to moderate (1-2+) tricuspid regurgitation.  The pulmonary artery systolic pressure is 53 mmHg.     Review of Systems   Constitutional: Negative for chills and fever.   Cardiovascular:  Positive for leg swelling. Negative for chest pain and palpitations.   Respiratory:  Negative for cough, shortness of breath and wheezing.    Gastrointestinal:  Negative for abdominal pain.     Objective:     Vital Signs (Most Recent):  Temp: 97.5 °F (36.4 °C) (09/06/24 0719)  Pulse: 63 (09/06/24 0719)  Resp: 20 (09/06/24 0719)  BP: 131/70 (09/06/24 0719)  SpO2: (!) 94 % (09/06/24 0719) Vital Signs (24h Range):  Temp:  [97.3 °F (36.3 °C)-97.8 °F (36.6 °C)] 97.5 °F (36.4 °C)  Pulse:  [62-83] 63  Resp:  [18-20] 20  SpO2:  [93 %-97 %] 94 %  BP: ()/(54-87) 131/70   Weight: 81.3 kg (179 lb 3.7 oz)  Body mass index is 31.75 kg/m².  SpO2: (!) 94 %       Intake/Output Summary (Last 24 hours) at 9/6/2024 0906  Last data filed at 9/6/2024 0748  Gross per 24 hour   Intake 1080 ml   Output 2000 ml   Net -920 ml     Lines/Drains/Airways       Peripheral Intravenous Line  Duration                  Peripheral IV - Single Lumen 09/03/24 1807 20 G Left Wrist 2 days                    Significant Labs:   Chemistries:   Recent Labs   Lab 09/03/24  1417 09/03/24  2208 09/04/24  0351 09/05/24  1644 09/06/24  0507   *  --  136 135* 135*   K 4.2  --  3.7 4.2 4.2   CL 98  --  101  "102 102   CO2 23  --  23 23 24   BUN 78.3*  --  79.0* 85.6* 84.3*   CREATININE 2.02*  --  1.79* 1.65* 1.48*   CALCIUM 10.3*  --  10.3* 10.0 10.0   BILITOT 1.4  --   --  0.7 0.6   ALKPHOS 87  --   --  82 77   ALT 7  --   --  11 11   AST 15  --   --  17 19   GLUCOSE 149*  --  101 180* 131*   MG 2.10 2.10  --  2.30 2.40   TROPONINI 0.246*  --   --   --   --         CBC/Anemia Labs: Coags:    Recent Labs   Lab 09/04/24  0351 09/05/24  1644 09/06/24  0507   WBC 11.65* 10.36 9.25   HGB 12.5 12.8 12.3   HCT 37.3 38.7 37.2    211 233   MCV 94.2* 96.0* 93.7   RDW 14.0 14.2 14.0    No results for input(s): "PT", "INR", "APTT" in the last 168 hours.     Telemetry:  NSR    Physical Exam  Constitutional:       General: She is not in acute distress.     Appearance: Normal appearance.   HENT:      Head: Normocephalic.      Mouth/Throat:      Mouth: Mucous membranes are dry.   Cardiovascular:      Rate and Rhythm: Normal rate and regular rhythm.      Pulses: Normal pulses.      Heart sounds: Normal heart sounds. No murmur heard.  Pulmonary:      Effort: Pulmonary effort is normal. No respiratory distress.      Breath sounds: Normal breath sounds.   Abdominal:      General: Abdomen is flat.   Musculoskeletal:      Right lower leg: Edema present.      Left lower leg: Edema present.   Neurological:      Mental Status: She is alert. Mental status is at baseline.         Current Schedule Inpatient Medications:   apixaban  2.5 mg Oral BID    bumetanide  2 mg Oral Daily    colchicine  0.6 mg Oral Daily    methIMAzole  2.5 mg Oral Daily    methylPREDNISolone injection (PEDS and ADULTS)  80 mg Intravenous Once    metoprolol succinate  12.5 mg Oral Daily    miconazole NITRATE 2 %   Topical (Top) BID    sertraline  50 mg Oral Daily    spironolactone  25 mg Oral Daily     Continuous Infusions:      Assessment:   Acute on chronic systolic heart failure ~ Compensated    - ECHO (9.4.24): LVEF 40-45% There is severe concentric hypertrophy. "    - Elevated .3   - ECHO (11.11.20): LVEF 55-60%  Pulmonary HTN  PAF (on Eliquis)   - [id number] = 4 point   - on Eliquis 2.5mg BID (due to age & weight)  SSS s/p PPM  CKD stage III  HLD  Hyperthyroidism  Gout  obesity      Plan:   ECHO reviewed mildly decreased EF 40-45%  Cont PO Bumex 2mg  (home medication)   Cont. Spironolactone   Cont. Eliquis 2.5mg BID for CVA risk reduction (reduced dose due to age and weight)   Add Metoprolol Succinate for GDMT  Cont. Strict I&Os  AM Labs: CBC, CMP, Mag  Likely okay to discharge tomorrow             Marina Moreno NP  Cardiology  [facility] - 4th Floor Medical Telemetry

## 2024-09-06 NOTE — ASSESSMENT & PLAN NOTE
- BNP elevated  - CXR showed cardiomegaly and questionable pneumonia.  CT chest 9/4 showed chronic changes, no clear-cut pneumonia.  No fever, no cough, no longer on oxygen.  - Echo 9/4 EF 40-45%  - She was given a dose of IV Lasix 9/3 and 9/4  - Continue spironolactone 25 mg.  At home, she is on bumetanide 2 mg in the morning and 1 mg at lunchtime.  She states today that she has not been taking her noon dose of bumetanide at home lately  - Lower extremity swelling has improved since admit.  Consulted case management for swing bed placement, [facility] [place] house

## 2024-09-06 NOTE — NURSING
Nurses Note -- 4 Eyes      9/6/2024   11:53 AM      Skin assessed during: Q Shift Change      [] No Altered Skin Integrity Present    []Prevention Measures Documented      [x] Yes- Altered Skin Integrity Present or Discovered   [x] LDA Added if Not in Epic (Describe Wound)   [x] New Altered Skin Integrity was Present on Admit and Documented in LDA   [] Wound Image Taken    Wound Care Consulted? Yes    Attending Nurse:  Claudia Brewer RN/Staff Member:   (BREANNA Felder)

## 2024-09-06 NOTE — ASSESSMENT & PLAN NOTE
- Bilateral wrist and shoulder pain, no recent trauma.  Likely gout flare-up  - She had allergy to allopurinol.  Give another dose of IV steroid (day #3), start colchicine, X-ray L wrist

## 2024-09-06 NOTE — PROGRESS NOTES
Nephrology  Note    Patient Name: Baudilio Mantilla  Age: 89 y.o.  : 1932  MRN: [id number]  Admission Date: 9/3/2024        Baudilio Mantilla is a 89 y.o. female has been admitted to the hospital for shortness of breath PND orthopnea and pedal edema.  Patient reports that she does not have any history of any kidney problems of any kind.  No kidney stones no kidney infections no hematuria no hesitancy frequency urgency.  No dysuria.  She has been having pedal edema and has been sleeping in a recliner because difficulty breathing in the bed.  She remains weak and has lot of arthritis pains also.  Patient reports that she has poor appetite.  No nausea no vomiting.  Denies chest pains or abdominal pains.  Denies diarrhea constipation.  Denies fever or chills.  Her significant previous medical history has been gout, thyroid problem , osteoarthritis problem and atrial fibrillation.    2024   Patient is sitting on the side of the bed.  Physical therapy is about to be started.  Patient does have some difficulty talking clearly although does not have any respiratory distress.  No nausea or vomiting.    24- renal indices continue to trend down.  Sodium 135, electrolytes otherwise within reference range.  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  She denies any shortness of breath or chest pain.  She denies any nausea or vomiting.  She is eating and drinking well. Good UO.    Current Facility-Administered Medications   Medication Dose Route Frequency Provider Last Rate Last Admin    apixaban tablet 2.5 mg  2.5 mg Oral BID Saman Kearns II, MD   2.5 mg at 24 0937    bumetanide tablet 2 mg  2 mg Oral Daily Saman Kearns II, MD   2 mg at 24 0938    colchicine capsule/tablet 0.6 mg  0.6 mg Oral Daily Saman Kearns II, MD   0.6 mg at 24 0938    melatonin tablet 6 mg  6 mg Oral Nightly PRN Jarrod Blackburn IV, MD        methIMAzole split tablet 2.5 mg  2.5 mg Oral Daily Saman Kearns  MD RIGO   2.5 mg at 24 09    metoprolol succinate (TOPROL-XL) 24 hr split tablet 12.5 mg  12.5 mg Oral Daily Marina Moreno NP   12.5 mg at 24    miconazole NITRATE 2 % top powder   Topical (Top) BID Saman Kearns II, MD   Given at 24 0939    ondansetron injection 4 mg  4 mg Intravenous Q8H PRN Jarrod Blackburn IV, MD        sertraline tablet 50 mg  50 mg Oral Daily Saman Kearns II, MD   50 mg at 24 09    sodium chloride 0.9% flush 10 mL  10 mL Intravenous PRN Jarrod Blackburn IV, MD        spironolactone tablet 25 mg  25 mg Oral Daily Saman Kearns II, MD   25 mg at 24 09    traMADoL tablet 50 mg  50 mg Oral TID PRN Saman Kearns II, MD   50 mg at 24       Saman Kearns II, MD    No past medical history on file.   Past Surgical History:   Procedure Laterality Date    CATARACT EXTRACTION, BILATERAL      FOOT SURGERY      MASTECTOMY Right     TOTAL KNEE ARTHROPLASTY Bilateral       Family History   Problem Relation Name Age of Onset    Diabetes Mother      Breast cancer Sister       Social History     Tobacco Use    Smoking status: Never    Smokeless tobacco: Never   Substance Use Topics    Alcohol use: Never     Medications Prior to Admission   Medication Sig Dispense Refill Last Dose    bumetanide (BUMEX) 1 MG tablet 2 tablets po q am, and 1 tablet po q noon. 90 tablet 11     colchicine (COLCRYS) 0.6 mg tablet Take 2 tablets now then 1 tablet daily for 6 days (Patient taking differently: Take 2 tablets now then 1 tablet daily for 6 days PRN gout flare ups) 10 tablet 6     diclofenac sodium (VOLTAREN) 1 % Gel SMARTSI Gram(s) Topical 4 Times Daily PRN       ELIQUIS 5 mg Tab Take 1 tablet (5 mg total) by mouth 2 (two) times daily. 60 tablet 11     ferrous sulfate (FEOSOL ORAL) Take 325 mg by mouth every Mon, Wed, Fri.       meclizine (ANTIVERT) 12.5 mg tablet Take 1 tablet (12.5 mg total) by mouth 3 (three) times daily as needed for Dizziness. 30  tablet 5     methIMAzole (TAPAZOLE) 5 MG Tab 0.5 tablet po daily. 15 tablet 11     nystatin (MYCOSTATIN) cream Apply topically 2 (two) times daily. for 7 days 30 g 2     pantoprazole (PROTONIX) 40 MG tablet Take 1 tablet (40 mg total) by mouth 2 (two) times daily. 60 tablet 11     sertraline (ZOLOFT) 50 MG tablet Take 1 tablet (50 mg total) by mouth once daily. 30 tablet 11     spironolactone (ALDACTONE) 25 MG tablet Take 1 tablet (25 mg total) by mouth once daily. 30 tablet 11     traMADoL (ULTRAM) 50 mg tablet Take 1 tablet (50 mg total) by mouth 2 (two) times daily as needed for Pain. 30 tablet 3     triamcinolone acetonide 0.1% (KENALOG) 0.1 % ointment Apply topically 2 (two) times daily. 453.6 g 1      Review of patient's allergies indicates:   Allergen Reactions    Penicillin Rash            Review of Systems:     All 12 point review of system done and is negative except 1 history of present illness.    Objective:       VITAL SIGNS: 24 HR MIN & MAX LAST    Temp  Min: 97.3 °F (36.3 °C)  Max: 97.8 °F (36.6 °C)  97.5 °F (36.4 °C)        BP  Min: 97/54  Max: 148/71  131/70     Pulse  Min: 62  Max: 83  63     Resp  Min: 18  Max: 20  20    SpO2  Min: 93 %  Max: 97 %  (!) 94 %      GEN:  Moderately developed and nourished.  Awake alert oriented to time person place lying down in the bed.  Slept better last night with the oxygen.  HEENT:  Atraumatic normocephalic.  Pupils reactive.  CV:  Irregularly irregular with heart rate about 60, murmur.  PULM:  Occasional rhonchi right posterior lower lobe otherwise CTAB, unlabored  ABD: Soft, NT/ND abdomen with NABS  EXT: No cyanosis, trace edema  SKIN: Warm and dry  PSYCH: Awake, alert, and appropriately conversant            Component Value Date/Time     (L) 09/06/2024 0507     (L) 09/05/2024 1644    K 4.2 09/06/2024 0507    K 4.2 09/05/2024 1644    CO2 24 09/06/2024 0507    CO2 23 09/05/2024 1644    BUN 84.3 (H) 09/06/2024 0507    BUN 85.6 (H) 09/05/2024 1644     CREATININE 1.48 (H) 09/06/2024 0507    CREATININE 1.65 (H) 09/05/2024 1644    CALCIUM 10.0 09/06/2024 0507    CALCIUM 10.0 09/05/2024 1644    PHOS 3.1 01/17/2018 0622            Component Value Date/Time    WBC 9.25 09/06/2024 0507    WBC 10.36 09/05/2024 1644    HGB 12.3 09/06/2024 0507    HGB 12.8 09/05/2024 1644    HCT 37.2 09/06/2024 0507    HCT 38.7 09/05/2024 1644     09/06/2024 0507     09/05/2024 1644         US Retroperitoneal Complete   Final Result      CT Chest Without Contrast   Final Result      Multilobar nodular ground-glass opacities within the lung suggest atypical infection.  Suggest six-month follow-up.      Mosaic attenuation of the lungs as can be seen with mosaic perfusion or air trapping.      Cardiomegaly.         Electronically signed by: Kitty Albarran   Date:    09/04/2024   Time:    08:18      X-Ray Chest AP Portable   Final Result      Cardiomegaly, questionable right lower lobe infiltrate         Electronically signed by: Oj Alonso MD   Date:    09/03/2024   Time:    22:31      X-Ray Wrist 2 View Left    (Results Pending)     Renal ultrasound reveals significantly echogenic kidneys suggestive of advanced chronic kidney disease.    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    Hyperglobulinemia will do workup, SPEP results pending.  Pedal edema unknown etiology.  Agree with the cardiac workup.    Recommendations  From the renal standpoint of view she does have advanced echogenic chronic kidney disease and no intervention is needed.    Need to avoid nephrotoxic agents.    SPEP results still pending.    Mayra Gutierrez NP

## 2024-09-06 NOTE — SUBJECTIVE & OBJECTIVE
Interval History: Stable overnight, L wrist pain persistent, R wrist pain nearly resolved    Review of Systems   Constitutional:  Negative for fever.   Respiratory:  Negative for cough and shortness of breath.    Cardiovascular:  Negative for chest pain.   Musculoskeletal:  Positive for arthralgias and joint swelling.     Objective:     Vital Signs (Most Recent):  Temp: 97.5 °F (36.4 °C) (09/06/24 0719)  Pulse: 63 (09/06/24 0719)  Resp: 20 (09/06/24 0719)  BP: 131/70 (09/06/24 0719)  SpO2: (!) 94 % (09/06/24 0719) Vital Signs (24h Range):  Temp:  [97.3 °F (36.3 °C)-97.8 °F (36.6 °C)] 97.5 °F (36.4 °C)  Pulse:  [62-83] 63  Resp:  [18-20] 20  SpO2:  [93 %-97 %] 94 %  BP: ()/(54-87) 131/70     Weight: 81.3 kg (179 lb 3.7 oz)  Body mass index is 31.75 kg/m².    Intake/Output Summary (Last 24 hours) at 9/6/2024 0806  Last data filed at 9/6/2024 0748  Gross per 24 hour   Intake 1080 ml   Output 2000 ml   Net -920 ml         Physical Exam  Constitutional:       General: She is not in acute distress.  Eyes:      Extraocular Movements: Extraocular movements intact.   Cardiovascular:      Rate and Rhythm: Normal rate. Rhythm irregular.   Pulmonary:      Effort: No respiratory distress.      Breath sounds: No wheezing.   Musculoskeletal:      Right lower leg: Edema present.      Left lower leg: Edema present.      Comments: L wrist TTP and swelling   Neurological:      Mental Status: She is alert.             Significant Labs: All pertinent labs within the past 24 hours have been reviewed.    Significant Imaging: I have reviewed all pertinent imaging results/findings within the past 24 hours.

## 2024-09-06 NOTE — PT/OT/SLP PROGRESS
Physical Therapy Treatment    Patient Name:  Baudilio Mantilla   MRN:  [id number]    Recommendations:     Discharge therapy intensity: Moderate Intensity Therapy   Discharge Equipment Recommendations: to be determined by next level of care  Barriers to discharge: Impaired mobility and Ongoing medical needs    Assessment:     Baudilio Mantilla is a 89 y.o. female admitted with a medical diagnosis of  CHF exac, paroxysmal atrial fibrillation, CKD, and gout.  She presents with the following impairments/functional limitations: weakness, impaired endurance, impaired balance, gait instability, impaired self care skills, impaired functional mobility, decreased safety awareness, pain, decreased upper extremity function.    Rehab Prognosis: Good; patient would benefit from acute skilled PT services to address these deficits and reach maximum level of function.    Recent Surgery: * No surgery found *      Plan:     During this hospitalization, patient would benefit from acute PT services 5 x/week to address the identified rehab impairments via gait training, therapeutic activities, therapeutic exercises and progress toward the following goals:    Plan of Care Expires:  10/04/24    Subjective     Chief Complaint: wrist pain  Patient/Family Comments/goals: to get better  Pain/Comfort:  Pain Rating 1: other (see comments) (L wrist pain; edema noted. Awaiting on x-ray results)  Pain Addressed 1: Reposition      Objective:     Communicated with RN prior to session.  Patient found HOB elevated with PureWick, telemetry upon PT entry to room.     General Precautions: Standard, fall  Orthopedic Precautions: N/A  Braces: N/A  Respiratory Status: Room air  Blood Pressure: NT  Skin Integrity: Visible skin intact      Functional Mobility:  Bed Mobility:    Supine to sit with max assist  Sit to supine with max assist  Transfers:    Sit<->stand with max assist x 2; kept L wrist NWB until results of x-ray  Balance: Static sitting balance  SBA    Therapeutic Activities/Exercises:  STEF LAQ's x 10 reps    Education:  Patient and family were provided with verbal education education regarding PT role/goals/POC and fall prevention.  Understanding was verbalized.     Patient left HOB elevated with all lines intact, call button in reach, wedge under R side, and nurse notified    GOALS:   Multidisciplinary Problems       Physical Therapy Goals          Problem: Physical Therapy    Goal Priority Disciplines Outcome Goal Variances Interventions   Physical Therapy Goal     PT, PT/OT Progressing     Description: Goals to be met by: 10/5/24     Patient will increase functional independence with mobility by performin. Supine to sit with Modified [place]  2. Sit to supine with Modified [place]  3. Sit to stand transfer with Stand-by Assistance  4. Bed to chair transfer with Stand-by Assistance using Rolling Walker  5. Gait  x 100 feet with Contact Guard Assistance using Rolling Walker.                          Time Tracking:     PT Received On: 24  PT Start Time: 1029     PT Stop Time: 1054  PT Total Time (min): 25 min     Billable Minutes: Therapeutic Activity 1 unit and Therapeutic Exercise 1 unit    Treatment Type: Treatment  PT/PTA: PTA     Number of PTA visits since last PT visit: 2024

## 2024-09-07 LAB
ALBUMIN SERPL-MCNC: 2.7 G/DL (ref 3.4–4.8)
ALBUMIN/GLOB SERPL: 0.8 RATIO (ref 1.1–2)
ALP SERPL-CCNC: 86 UNIT/L (ref 40–150)
ALT SERPL-CCNC: 34 UNIT/L (ref 0–55)
ANION GAP SERPL CALC-SCNC: 10 MEQ/L
AST SERPL-CCNC: 44 UNIT/L (ref 5–34)
BASOPHILS # BLD AUTO: 0.01 X10(3)/MCL
BASOPHILS NFR BLD AUTO: 0.1 %
BILIRUB SERPL-MCNC: 0.9 MG/DL
BUN SERPL-MCNC: 89.7 MG/DL (ref 9.8–20.1)
CALCIUM SERPL-MCNC: 10 MG/DL (ref 8.4–10.2)
CHLORIDE SERPL-SCNC: 105 MMOL/L (ref 98–111)
CO2 SERPL-SCNC: 24 MMOL/L (ref 23–31)
CREAT SERPL-MCNC: 1.28 MG/DL (ref 0.55–1.02)
CREAT/UREA NIT SERPL: 70
EOSINOPHIL # BLD AUTO: 0 X10(3)/MCL (ref 0–0.9)
EOSINOPHIL NFR BLD AUTO: 0 %
ERYTHROCYTE [DISTWIDTH] IN BLOOD BY AUTOMATED COUNT: 13.9 % (ref 11.5–17)
GFR SERPLBLD CREATININE-BSD FMLA CKD-EPI: 39 ML/MIN/1.73/M2
GLOBULIN SER-MCNC: 3.4 GM/DL (ref 2.4–3.5)
GLUCOSE SERPL-MCNC: 123 MG/DL (ref 75–121)
HCT VFR BLD AUTO: 38.7 % (ref 37–47)
HGB BLD-MCNC: 13 G/DL (ref 12–16)
IMM GRANULOCYTES # BLD AUTO: 0.05 X10(3)/MCL (ref 0–0.04)
IMM GRANULOCYTES NFR BLD AUTO: 0.5 %
LYMPHOCYTES # BLD AUTO: 0.89 X10(3)/MCL (ref 0.6–4.6)
LYMPHOCYTES NFR BLD AUTO: 9.3 %
MCH RBC QN AUTO: 31.7 PG (ref 27–31)
MCHC RBC AUTO-ENTMCNC: 33.6 G/DL (ref 33–36)
MCV RBC AUTO: 94.4 FL (ref 80–94)
MONOCYTES # BLD AUTO: 0.81 X10(3)/MCL (ref 0.1–1.3)
MONOCYTES NFR BLD AUTO: 8.5 %
NEUTROPHILS # BLD AUTO: 7.82 X10(3)/MCL (ref 2.1–9.2)
NEUTROPHILS NFR BLD AUTO: 81.6 %
NRBC BLD AUTO-RTO: 0 %
PLATELET # BLD AUTO: 247 X10(3)/MCL (ref 130–400)
PMV BLD AUTO: 10.7 FL (ref 7.4–10.4)
POTASSIUM SERPL-SCNC: 3.9 MMOL/L (ref 3.5–5.1)
PROT SERPL-MCNC: 6.1 GM/DL (ref 5.8–7.6)
RBC # BLD AUTO: 4.1 X10(6)/MCL (ref 4.2–5.4)
SODIUM SERPL-SCNC: 139 MMOL/L (ref 136–145)
WBC # BLD AUTO: 9.58 X10(3)/MCL (ref 4.5–11.5)

## 2024-09-07 PROCEDURE — 25000003 PHARM REV CODE 250: Performed by: NURSE PRACTITIONER

## 2024-09-07 PROCEDURE — 25000003 PHARM REV CODE 250: Performed by: INTERNAL MEDICINE

## 2024-09-07 PROCEDURE — 80053 COMPREHEN METABOLIC PANEL: CPT | Performed by: INTERNAL MEDICINE

## 2024-09-07 PROCEDURE — 25000003 PHARM REV CODE 250: Performed by: STUDENT IN AN ORGANIZED HEALTH CARE EDUCATION/TRAINING PROGRAM

## 2024-09-07 PROCEDURE — 21400001 HC TELEMETRY ROOM

## 2024-09-07 PROCEDURE — 36415 COLL VENOUS BLD VENIPUNCTURE: CPT | Performed by: INTERNAL MEDICINE

## 2024-09-07 PROCEDURE — 99232 SBSQ HOSP IP/OBS MODERATE 35: CPT | Mod: ,,, | Performed by: INTERNAL MEDICINE

## 2024-09-07 PROCEDURE — 85025 COMPLETE CBC W/AUTO DIFF WBC: CPT | Performed by: INTERNAL MEDICINE

## 2024-09-07 RX ADMIN — TRAMADOL HYDROCHLORIDE 50 MG: 50 TABLET, COATED ORAL at 09:09

## 2024-09-07 RX ADMIN — APIXABAN 2.5 MG: 2.5 TABLET, FILM COATED ORAL at 09:09

## 2024-09-07 RX ADMIN — COLCHICINE 0.6 MG: 0.6 CAPSULE ORAL at 09:09

## 2024-09-07 RX ADMIN — MICONAZOLE NITRATE 2 % TOPICAL POWDER: at 10:09

## 2024-09-07 RX ADMIN — METOPROLOL SUCCINATE 12.5 MG: 25 TABLET, EXTENDED RELEASE ORAL at 09:09

## 2024-09-07 RX ADMIN — BUMETANIDE 2 MG: 1 TABLET ORAL at 09:09

## 2024-09-07 RX ADMIN — SERTRALINE HYDROCHLORIDE 50 MG: 50 TABLET ORAL at 09:09

## 2024-09-07 RX ADMIN — METHIMAZOLE 2.5 MG: 5 TABLET ORAL at 09:09

## 2024-09-07 RX ADMIN — Medication 6 MG: at 09:09

## 2024-09-07 RX ADMIN — MICONAZOLE NITRATE 2 % TOPICAL POWDER: at 09:09

## 2024-09-07 RX ADMIN — SPIRONOLACTONE 25 MG: 25 TABLET ORAL at 09:09

## 2024-09-07 RX ADMIN — BUMETANIDE 1 MG: 1 TABLET ORAL at 04:09

## 2024-09-07 NOTE — SUBJECTIVE & OBJECTIVE
Interval History: Stable overnight, states left wrist pain is still present from gout.  Otherwise waiting on placement    Review of Systems   Constitutional:  Negative for fever.   Respiratory:  Negative for cough and shortness of breath.    Cardiovascular:  Negative for chest pain.   Musculoskeletal:  Positive for arthralgias and joint swelling.     Objective:     Vital Signs (Most Recent):  Temp: 97.5 °F (36.4 °C) (09/07/24 1427)  Pulse: 62 (09/07/24 1427)  Resp: 18 (09/07/24 1427)  BP: 124/71 (09/07/24 1427)  SpO2: 95 % (09/07/24 1427) Vital Signs (24h Range):  Temp:  [97.3 °F (36.3 °C)-97.9 °F (36.6 °C)] 97.5 °F (36.4 °C)  Pulse:  [60-87] 62  Resp:  [18] 18  SpO2:  [92 %-99 %] 95 %  BP: (122-151)/(66-73) 124/71     Weight: 79.9 kg (176 lb 2.4 oz)  Body mass index is 31.2 kg/m².    Intake/Output Summary (Last 24 hours) at 9/7/2024 1604  Last data filed at 9/7/2024 1327  Gross per 24 hour   Intake 840 ml   Output 1300 ml   Net -460 ml         Physical Exam  Constitutional:       General: She is not in acute distress.  Eyes:      Extraocular Movements: Extraocular movements intact.   Cardiovascular:      Rate and Rhythm: Normal rate. Rhythm irregular.   Pulmonary:      Effort: No respiratory distress.      Breath sounds: No wheezing.   Musculoskeletal:      Right lower leg: Edema present.      Left lower leg: Edema present.      Comments: L wrist TTP and swelling   Neurological:      Mental Status: She is alert.             Significant Labs: All pertinent labs within the past 24 hours have been reviewed.    Significant Imaging: I have reviewed all pertinent imaging results/findings within the past 24 hours.

## 2024-09-07 NOTE — PROGRESS NOTES
[facility] - 4th Floor [facility] Medicine  Progress Note    Patient Name: Baudilio Mantilla  MRN: [id number]  Patient Class: IP- Inpatient   Admission Date: 9/3/2024  Length of Stay: 4 days  Attending Physician: Saman Kearns II, MD  Primary Care Provider: Saman Kearns II, MD        Subjective:     Principal Problem:Acute on chronic systolic (congestive) heart failure        HPI:  89 year old white female with history of CHF presented to the ED 9/3 for generalized weakness and SOB. Patient sleeps in her recliner because of CHF and was not able to get out of the chair due to generalized weakness. Patient also reports bilateral shoulder and wrist pain over the past 1-2 weeks. She is on Bumex and spironolactone and has been compliant with her medications. She reports worsening leg swelling and SOB.  BNP was elevated, and chest x-ray showed cardiomegaly.  She was given a dose of IV Lasix last night.  She is currently on 1 L of oxygen with oxygen saturation of 98%.    She has a past medical history of chronic systolic heart failure, paroxysmal atrial fibrillation, gout, hypertension, hyperparathyroidism among other conditions.  She lives alone.  She denies alcohol, tobacco, or drug use.  See below for more details of social history, family history, past medical history etc    Overview/Hospital Course:  No notes on file    Interval History: Stable overnight, states left wrist pain is still present from gout.  Otherwise waiting on placement    Review of Systems   Constitutional:  Negative for fever.   Respiratory:  Negative for cough and shortness of breath.    Cardiovascular:  Negative for chest pain.   Musculoskeletal:  Positive for arthralgias and joint swelling.     Objective:     Vital Signs (Most Recent):  Temp: 97.5 °F (36.4 °C) (09/07/24 1427)  Pulse: 62 (09/07/24 1427)  Resp: 18 (09/07/24 1427)  BP: 124/71 (09/07/24 1427)  SpO2: 95 % (09/07/24 1427) Vital Signs (24h  Range):  Temp:  [97.3 °F (36.3 °C)-97.9 °F (36.6 °C)] 97.5 °F (36.4 °C)  Pulse:  [60-87] 62  Resp:  [18] 18  SpO2:  [92 %-99 %] 95 %  BP: (122-151)/(66-73) 124/71     Weight: 79.9 kg (176 lb 2.4 oz)  Body mass index is 31.2 kg/m².    Intake/Output Summary (Last 24 hours) at 9/7/2024 1604  Last data filed at 9/7/2024 1327  Gross per 24 hour   Intake 840 ml   Output 1300 ml   Net -460 ml         Physical Exam  Constitutional:       General: She is not in acute distress.  Eyes:      Extraocular Movements: Extraocular movements intact.   Cardiovascular:      Rate and Rhythm: Normal rate. Rhythm irregular.   Pulmonary:      Effort: No respiratory distress.      Breath sounds: No wheezing.   Musculoskeletal:      Right lower leg: Edema present.      Left lower leg: Edema present.      Comments: L wrist TTP and swelling   Neurological:      Mental Status: She is alert.             Significant Labs: All pertinent labs within the past 24 hours have been reviewed.    Significant Imaging: I have reviewed all pertinent imaging results/findings within the past 24 hours.    Assessment/Plan:      * Acute on chronic systolic (congestive) heart failure  - BNP elevated  - CXR showed cardiomegaly and questionable pneumonia.  CT chest 9/4 showed chronic changes, no clear-cut pneumonia.  No fever, no cough, no longer on oxygen.  - Echo 9/4 EF 40-45%  - She was given a dose of IV Lasix 9/3 and 9/4  - Continue spironolactone 25 mg.  At home, she is on bumetanide 2 mg in the morning and 1 mg at lunchtime.  She states today that she has not been taking her noon dose of bumetanide at home lately  - Lower extremity swelling has improved since admit.  Consulted case management for swing bed placement, [facility] near her house    Gout of multiple sites  - Bilateral wrist and shoulder pain, no recent trauma.  Likely gout flare-up  - She had allergy to allopurinol.  Give another dose of IV steroid (day #3), start colchicine, X-ray L  wrist    Chronic kidney disease (CKD), stage IV (severe)  - Nephrology following, renal ultrasound shows chronic disease  - Creatinine improving; BUN above 80 (received steroids past few days)    Paroxysmal atrial fibrillation  - Diagnosed in 2015, had digoxin toxicity in 2016 and was in the ICU with heart rate in the 20s and 30s. At that time, she was on digoxin, amiodarone, and Cardizem. Pacemaker placed in 2020, no longer on metoprolol  - Rate controlled  - Eliquis 2.5 mg    Primary hypertension  - Stable    Hyperthyroidism  - On methimazole 2.5 mg daily  - TSH and free T4 normal      VTE Risk Mitigation (From admission, onward)           Ordered     apixaban tablet 2.5 mg  2 times daily         09/04/24 0744     IP VTE HIGH RISK PATIENT  Once         09/03/24 2159     Place sequential compression device  Until discontinued         09/03/24 2159                    Discharge Planning   SAMARIA:      Code Status: DNR   Is the patient medically ready for discharge?:     Reason for patient still in hospital (select all that apply): Treatment  Discharge Plan A: Skilled Nursing Facility          Continue current therapy  Currently waiting for placement        LUTHER CERVANTES MD  Department of Hospital Medicine   [facility] - 4th Floor Medical Telemetry

## 2024-09-07 NOTE — PROGRESS NOTES
NEPHROLOGY PROGRESS NOTE      Patient Demographics:  Name:  Baudilio Mantilla  Age: 89 y.o.  MRN:  [id number]  Admission Date: 9/3/2024      Subjective:      Feeling better    Edema mostly resolved    LUDWIG resolved    Current Facility-Administered Medications   Medication Dose Route Frequency Provider Last Rate Last Admin    apixaban tablet 2.5 mg  2.5 mg Oral BID Saman Kearns II, MD   2.5 mg at 09/07/24 0916    bumetanide tablet 1 mg  1 mg Oral Daily with dinner Saman Kearns II, MD        bumetanide tablet 2 mg  2 mg Oral Daily Saman Kearns II, MD   2 mg at 09/07/24 0916    colchicine capsule/tablet 0.6 mg  0.6 mg Oral Daily Saman Kearns II, MD   0.6 mg at 09/07/24 0916    melatonin tablet 6 mg  6 mg Oral Nightly PRN CuretJarrod IV, MD        methIMAzole split tablet 2.5 mg  2.5 mg Oral Daily Saman Kearns II, MD   2.5 mg at 09/07/24 0916    metoprolol succinate (TOPROL-XL) 24 hr split tablet 12.5 mg  12.5 mg Oral Daily Josh, Marina, NP   12.5 mg at 09/07/24 0916    miconazole NITRATE 2 % top powder   Topical (Top) BID Saman Kearns II, MD   Given at 09/07/24 0918    ondansetron injection 4 mg  4 mg Intravenous Q8H PRN CuretJarrod IV, MD        sertraline tablet 50 mg  50 mg Oral Daily Saman Kearns II, MD   50 mg at 09/07/24 0916    sodium chloride 0.9% flush 10 mL  10 mL Intravenous PRN CuretJarrod IV, MD        spironolactone tablet 25 mg  25 mg Oral Daily Saman Kearns II, MD   25 mg at 09/07/24 0916    traMADoL tablet 50 mg  50 mg Oral TID PRN Saman Kearns II, MD   50 mg at 09/05/24 2135           Review of Systems   Constitutional:  Positive for malaise/fatigue.   HENT: Negative.     Eyes: Negative.    Respiratory: Negative.     Cardiovascular: Negative.    Gastrointestinal: Negative.    Genitourinary: Negative.    Musculoskeletal: Negative.    Skin: Negative.    Neurological:  Positive for weakness.         Objective:    /66   Pulse 68   Temp 97.4 °F  "(36.3 °C) (Oral)   Resp 18   Ht 5' 3" (1.6 m)   Wt 79.9 kg (176 lb 2.4 oz)   SpO2 99%   BMI 31.20 kg/m²       Intake/Output Summary (Last 24 hours) at 9/7/2024 1346  Last data filed at 9/7/2024 1327  Gross per 24 hour   Intake 1080 ml   Output 1300 ml   Net -220 ml             Physical Exam  Vitals reviewed.   Constitutional:       Appearance: Normal appearance.   HENT:      Head: Normocephalic and atraumatic.      Nose: Nose normal.   Eyes:      Extraocular Movements: Extraocular movements intact.   Cardiovascular:      Rate and Rhythm: Normal rate and regular rhythm.      Pulses: Normal pulses.      Heart sounds: Normal heart sounds.   Pulmonary:      Effort: Pulmonary effort is normal.      Breath sounds: Normal breath sounds.   Abdominal:      General: Bowel sounds are normal.      Palpations: Abdomen is soft.   Musculoskeletal:         General: Normal range of motion.      Cervical back: Normal range of motion.   Skin:     General: Skin is warm and dry.   Neurological:      General: No focal deficit present.      Mental Status: She is alert and oriented to person, place, and time. Mental status is at baseline.   Psychiatric:         Mood and Affect: Mood normal.            Inpatient Diagnostics:  Recent Results (from the past 24 hour(s))   Immunoglobulins (IgG, IgA, IgM) Quantitative    Collection Time: 09/06/24  3:02 PM   Result Value Ref Range    IgG Level 1,279.00 522.00 - 1,631.00 mg/dL    IgA Level 299.0 69.0 - 517.0 mg/dL    IgM Level 42.0 33.0 - 293.0 mg/dL   Comprehensive Metabolic Panel    Collection Time: 09/07/24  5:45 AM   Result Value Ref Range    Sodium 139 136 - 145 mmol/L    Potassium 3.9 3.5 - 5.1 mmol/L    Chloride 105 98 - 111 mmol/L    CO2 24 23 - 31 mmol/L    Glucose 123 (H) 75 - 121 mg/dL    Blood Urea Nitrogen 89.7 (H) 9.8 - 20.1 mg/dL    Creatinine 1.28 (H) 0.55 - 1.02 mg/dL    Calcium 10.0 8.4 - 10.2 mg/dL    Protein Total 6.1 5.8 - 7.6 gm/dL    Albumin 2.7 (L) 3.4 - 4.8 g/dL    " Globulin 3.4 2.4 - 3.5 gm/dL    Albumin/Globulin Ratio 0.8 (L) 1.1 - 2.0 ratio    Bilirubin Total 0.9 <=1.5 mg/dL    ALP 86 40 - 150 unit/L    ALT 34 0 - 55 unit/L    AST 44 (H) 5 - 34 unit/L    eGFR 39 mL/min/1.73/m2    Anion Gap 10.0 mEq/L    BUN/Creatinine Ratio 70    CBC with Differential    Collection Time: 09/07/24  5:45 AM   Result Value Ref Range    WBC 9.58 4.50 - 11.50 x10(3)/mcL    RBC 4.10 (L) 4.20 - 5.40 x10(6)/mcL    Hgb 13.0 12.0 - 16.0 g/dL    Hct 38.7 37.0 - 47.0 %    MCV 94.4 (H) 80.0 - 94.0 fL    MCH 31.7 (H) 27.0 - 31.0 pg    MCHC 33.6 33.0 - 36.0 g/dL    RDW 13.9 11.5 - 17.0 %    Platelet 247 130 - 400 x10(3)/mcL    MPV 10.7 (H) 7.4 - 10.4 fL    Neut % 81.6 %    Lymph % 9.3 %    Mono % 8.5 %    Eos % 0.0 %    Basophil % 0.1 %    Lymph # 0.89 0.6 - 4.6 x10(3)/mcL    Neut # 7.82 2.1 - 9.2 x10(3)/mcL    Mono # 0.81 0.1 - 1.3 x10(3)/mcL    Eos # 0.00 0 - 0.9 x10(3)/mcL    Baso # 0.01 <=0.2 x10(3)/mcL    IG# 0.05 (H) 0 - 0.04 x10(3)/mcL    IG% 0.5 %    NRBC% 0.0 %     A/P--NE 09/07    1---LUDWIG/ CKD IIIb-IV---Indices are resolving/ Good output  2---A Fib---Cont same management  3---Deconditioning--Cont PT    IV--SL    ORDERS:  Chem 7 in am                        Agree with above.  Patient examined. Orders reviewed.  No complaints.  CTAB  No edema    Renal status resolved to baseline with good UOP.  Monitor for renal improvement.  Continue supportive care.  Will sign off.  Please reconsult an needed.  Thanks

## 2024-09-07 NOTE — NURSING
Nurses Note -- 4 Eyes      9/7/2024   5:19 AM      Skin assessed during: Q Shift Change      [x] No Altered Skin Integrity Present    [x]Prevention Measures Documented      [] Yes- Altered Skin Integrity Present or Discovered   [] LDA Added if Not in Epic (Describe Wound)   [] New Altered Skin Integrity was Present on Admit and Documented in LDA   [] Wound Image Taken    Wound Care Consulted? No    Attending Nurse:  Mukesh Brewer RN/Staff Member:  parris

## 2024-09-08 LAB
ANION GAP SERPL CALC-SCNC: 10 MEQ/L
BUN SERPL-MCNC: 85.3 MG/DL (ref 9.8–20.1)
CALCIUM SERPL-MCNC: 10.3 MG/DL (ref 8.4–10.2)
CHLORIDE SERPL-SCNC: 102 MMOL/L (ref 98–111)
CO2 SERPL-SCNC: 29 MMOL/L (ref 23–31)
CREAT SERPL-MCNC: 1.33 MG/DL (ref 0.55–1.02)
CREAT/UREA NIT SERPL: 64
GFR SERPLBLD CREATININE-BSD FMLA CKD-EPI: 38 ML/MIN/1.73/M2
GLUCOSE SERPL-MCNC: 103 MG/DL (ref 75–121)
POTASSIUM SERPL-SCNC: 3.9 MMOL/L (ref 3.5–5.1)
SODIUM SERPL-SCNC: 141 MMOL/L (ref 136–145)

## 2024-09-08 PROCEDURE — 21400001 HC TELEMETRY ROOM

## 2024-09-08 PROCEDURE — 99232 SBSQ HOSP IP/OBS MODERATE 35: CPT | Mod: ,,, | Performed by: INTERNAL MEDICINE

## 2024-09-08 PROCEDURE — 25000003 PHARM REV CODE 250: Performed by: STUDENT IN AN ORGANIZED HEALTH CARE EDUCATION/TRAINING PROGRAM

## 2024-09-08 PROCEDURE — 36415 COLL VENOUS BLD VENIPUNCTURE: CPT | Performed by: NURSE PRACTITIONER

## 2024-09-08 PROCEDURE — 25000003 PHARM REV CODE 250: Performed by: NURSE PRACTITIONER

## 2024-09-08 PROCEDURE — A4216 STERILE WATER/SALINE, 10 ML: HCPCS | Performed by: STUDENT IN AN ORGANIZED HEALTH CARE EDUCATION/TRAINING PROGRAM

## 2024-09-08 PROCEDURE — 25000003 PHARM REV CODE 250: Performed by: INTERNAL MEDICINE

## 2024-09-08 PROCEDURE — 80048 BASIC METABOLIC PNL TOTAL CA: CPT | Performed by: NURSE PRACTITIONER

## 2024-09-08 RX ADMIN — BUMETANIDE 2 MG: 1 TABLET ORAL at 08:09

## 2024-09-08 RX ADMIN — SPIRONOLACTONE 25 MG: 25 TABLET ORAL at 08:09

## 2024-09-08 RX ADMIN — APIXABAN 2.5 MG: 2.5 TABLET, FILM COATED ORAL at 08:09

## 2024-09-08 RX ADMIN — METOPROLOL SUCCINATE 12.5 MG: 25 TABLET, EXTENDED RELEASE ORAL at 08:09

## 2024-09-08 RX ADMIN — METHIMAZOLE 2.5 MG: 5 TABLET ORAL at 08:09

## 2024-09-08 RX ADMIN — BUMETANIDE 1 MG: 1 TABLET ORAL at 09:09

## 2024-09-08 RX ADMIN — MICONAZOLE NITRATE 2 % TOPICAL POWDER: at 08:09

## 2024-09-08 RX ADMIN — COLCHICINE 0.6 MG: 0.6 CAPSULE ORAL at 08:09

## 2024-09-08 RX ADMIN — MICONAZOLE NITRATE 2 % TOPICAL POWDER: at 09:09

## 2024-09-08 RX ADMIN — Medication 6 MG: at 09:09

## 2024-09-08 RX ADMIN — TRAMADOL HYDROCHLORIDE 50 MG: 50 TABLET, COATED ORAL at 09:09

## 2024-09-08 RX ADMIN — TRAMADOL HYDROCHLORIDE 50 MG: 50 TABLET, COATED ORAL at 04:09

## 2024-09-08 RX ADMIN — SERTRALINE HYDROCHLORIDE 50 MG: 50 TABLET ORAL at 08:09

## 2024-09-08 RX ADMIN — Medication 10 ML: at 06:09

## 2024-09-08 RX ADMIN — APIXABAN 2.5 MG: 2.5 TABLET, FILM COATED ORAL at 09:09

## 2024-09-08 NOTE — PT/OT/SLP PROGRESS
Physical Therapy      Patient Name:  Baudilio Mantilla   MRN:  [id number]    Patient not seen today secondary to Unar[employer] family asked to hold treatment today, stated that pt not doing good and when I tried to wake her she did not wake but moaned like she was dreaming. NSG notified. Will follow-up 9/9/24.

## 2024-09-08 NOTE — PROGRESS NOTES
[facility] - 4th Floor [facility] Medicine  Progress Note    Patient Name: Baudilio Mantilla  MRN: [id number]  Patient Class: IP- Inpatient   Admission Date: 9/3/2024  Length of Stay: 5 days  Attending Physician: Saman Kearns II, MD  Primary Care Provider: Saman Kearns II, MD        Subjective:     Principal Problem:Acute on chronic systolic (congestive) heart failure        HPI:  89 year old white female with history of CHF presented to the ED 9/3 for generalized weakness and SOB. Patient sleeps in her recliner because of CHF and was not able to get out of the chair due to generalized weakness. Patient also reports bilateral shoulder and wrist pain over the past 1-2 weeks. She is on Bumex and spironolactone and has been compliant with her medications. She reports worsening leg swelling and SOB.  BNP was elevated, and chest x-ray showed cardiomegaly.  She was given a dose of IV Lasix last night.  She is currently on 1 L of oxygen with oxygen saturation of 98%.    She has a past medical history of chronic systolic heart failure, paroxysmal atrial fibrillation, gout, hypertension, hyperparathyroidism among other conditions.  She lives alone.  She denies alcohol, tobacco, or drug use.  See below for more details of social history, family history, past medical history etc    Overview/Hospital Course:  No notes on file    Interval History:  No new events or complaints.  Awaiting placement   Vital signs stable afebrile      Review of Systems   Constitutional:  Negative for fever.   Respiratory:  Negative for cough and shortness of breath.    Cardiovascular:  Negative for chest pain.   Musculoskeletal:  Positive for arthralgias and joint swelling.     Objective:     Vital Signs (Most Recent):  Temp: 97.6 °F (36.4 °C) (09/08/24 1102)  Pulse: 73 (09/08/24 1102)  Resp: 18 (09/08/24 1102)  BP: 102/74 (09/08/24 1102)  SpO2: (!) 94 % (09/08/24 0709) Vital Signs (24h Range):  Temp:  [97.3 °F  (36.3 °C)-97.9 °F (36.6 °C)] 97.6 °F (36.4 °C)  Pulse:  [] 73  Resp:  [18-20] 18  SpO2:  [90 %-97 %] 94 %  BP: (102-143)/(66-84) 102/74     Weight: 78.1 kg (172 lb 2.9 oz)  Body mass index is 30.5 kg/m².    Intake/Output Summary (Last 24 hours) at 9/8/2024 1432  Last data filed at 9/8/2024 0550  Gross per 24 hour   Intake 190 ml   Output 2525 ml   Net -2335 ml         Physical Exam  Constitutional:       General: She is not in acute distress.  Eyes:      Extraocular Movements: Extraocular movements intact.   Cardiovascular:      Rate and Rhythm: Normal rate. Rhythm irregular.   Pulmonary:      Effort: No respiratory distress.      Breath sounds: No wheezing.   Musculoskeletal:      Right lower leg: Edema present.      Left lower leg: Edema present.      Comments: L wrist TTP and swelling   Neurological:      Mental Status: She is alert.             Significant Labs: All pertinent labs within the past 24 hours have been reviewed.    Significant Imaging: I have reviewed all pertinent imaging results/findings within the past 24 hours.    Assessment/Plan:      * Acute on chronic systolic (congestive) heart failure  - BNP elevated  - CXR showed cardiomegaly and questionable pneumonia.  CT chest 9/4 showed chronic changes, no clear-cut pneumonia.  No fever, no cough, no longer on oxygen.  - Echo 9/4 EF 40-45%  - She was given a dose of IV Lasix 9/3 and 9/4  - Continue spironolactone 25 mg.  At home, she is on bumetanide 2 mg in the morning and 1 mg at lunchtime.  She states today that she has not been taking her noon dose of bumetanide at home lately  - Lower extremity swelling has improved since admit.  Consulted case management for swing bed placement, [place] preferred near her house    Gout of multiple sites  - Bilateral wrist and shoulder pain, no recent trauma.  Likely gout flare-up  - She had allergy to allopurinol.  Give another dose of IV steroid (day #3), start colchicine, X-ray L wrist    Chronic  kidney disease (CKD), stage IV (severe)  - Nephrology following, renal ultrasound shows chronic disease  - Creatinine improving; BUN above 80 (received steroids past few days)    Paroxysmal atrial fibrillation  - Diagnosed in 2015, had digoxin toxicity in 2016 and was in the ICU with heart rate in the 20s and 30s. At that time, she was on digoxin, amiodarone, and Cardizem. Pacemaker placed in 2020, no longer on metoprolol  - Rate controlled  - Eliquis 2.5 mg    Primary hypertension  - Stable    Hyperthyroidism  - On methimazole 2.5 mg daily  - TSH and free T4 normal      VTE Risk Mitigation (From admission, onward)           Ordered     apixaban tablet 2.5 mg  2 times daily         09/04/24 0744     IP VTE HIGH RISK PATIENT  Once         09/03/24 2159     Place sequential compression device  Until discontinued         09/03/24 2159                    Discharge Planning   SAMARIA:      Code Status: DNR   Is the patient medically ready for discharge?:     Reason for patient still in hospital (select all that apply): Treatment  Discharge Plan A: Skilled Nursing Facility          Awaiting placement continue current therapy        LUTHER CERVANTES MD  Department of Hospital Medicine   [facility] - 4th Floor Medical Telemetry

## 2024-09-08 NOTE — NURSING
Nurses Note -- 4 Eyes      9/8/2024   7:45 AM      Skin assessed during: Q Shift Change      [] No Altered Skin Integrity Present    []Prevention Measures Documented      [x] Yes- Altered Skin Integrity Present or Discovered   [] LDA Added if Not in Epic (Describe Wound)   [] New Altered Skin Integrity was Present on Admit and Documented in LDA   [] Wound Image Taken    Wound Care Consulted? No    Attending Nurse:  Deepika Brewer RN/Staff Member:  Deana

## 2024-09-08 NOTE — SUBJECTIVE & OBJECTIVE
Interval History:  No new events or complaints.  Awaiting placement   Vital signs stable afebrile      Review of Systems   Constitutional:  Negative for fever.   Respiratory:  Negative for cough and shortness of breath.    Cardiovascular:  Negative for chest pain.   Musculoskeletal:  Positive for arthralgias and joint swelling.     Objective:     Vital Signs (Most Recent):  Temp: 97.6 °F (36.4 °C) (09/08/24 1102)  Pulse: 73 (09/08/24 1102)  Resp: 18 (09/08/24 1102)  BP: 102/74 (09/08/24 1102)  SpO2: (!) 94 % (09/08/24 0709) Vital Signs (24h Range):  Temp:  [97.3 °F (36.3 °C)-97.9 °F (36.6 °C)] 97.6 °F (36.4 °C)  Pulse:  [] 73  Resp:  [18-20] 18  SpO2:  [90 %-97 %] 94 %  BP: (102-143)/(66-84) 102/74     Weight: 78.1 kg (172 lb 2.9 oz)  Body mass index is 30.5 kg/m².    Intake/Output Summary (Last 24 hours) at 9/8/2024 1432  Last data filed at 9/8/2024 0550  Gross per 24 hour   Intake 190 ml   Output 2525 ml   Net -2335 ml         Physical Exam  Constitutional:       General: She is not in acute distress.  Eyes:      Extraocular Movements: Extraocular movements intact.   Cardiovascular:      Rate and Rhythm: Normal rate. Rhythm irregular.   Pulmonary:      Effort: No respiratory distress.      Breath sounds: No wheezing.   Musculoskeletal:      Right lower leg: Edema present.      Left lower leg: Edema present.      Comments: L wrist TTP and swelling   Neurological:      Mental Status: She is alert.             Significant Labs: All pertinent labs within the past 24 hours have been reviewed.    Significant Imaging: I have reviewed all pertinent imaging results/findings within the past 24 hours.

## 2024-09-09 LAB
ALBUMIN % SPEP (OHS): 40.57 (ref 48.1–59.5)
ALBUMIN SERPL-MCNC: 2.4 G/DL (ref 3.4–4.8)
ALBUMIN SERPL-MCNC: 2.9 G/DL (ref 3.4–4.8)
ALBUMIN/GLOB SERPL: 0.7 RATIO (ref 1.1–2)
ALBUMIN/GLOB SERPL: 0.8 RATIO (ref 1.1–2)
ALP SERPL-CCNC: 93 UNIT/L (ref 40–150)
ALPHA 1 GLOB (OHS): 0.34 GM/DL (ref 0–0.4)
ALPHA 1 GLOB% (OHS): 5.72 (ref 2.3–4.9)
ALPHA 2 GLOB % (OHS): 17.11 (ref 6.9–13)
ALPHA 2 GLOB (OHS): 1.03 GM/DL (ref 0.4–1)
ALT SERPL-CCNC: 42 UNIT/L (ref 0–55)
ANION GAP SERPL CALC-SCNC: 11 MEQ/L
AST SERPL-CCNC: 31 UNIT/L (ref 5–34)
BACTERIA BLD CULT: NORMAL
BACTERIA BLD CULT: NORMAL
BASOPHILS # BLD AUTO: 0.01 X10(3)/MCL
BASOPHILS NFR BLD AUTO: 0.1 %
BETA GLOB (OHS): 1.01 GM/DL (ref 0.7–1.3)
BETA GLOB% (OHS): 16.77 (ref 13.8–19.7)
BILIRUB SERPL-MCNC: 1.4 MG/DL
BUN SERPL-MCNC: 77.3 MG/DL (ref 9.8–20.1)
CALCIUM SERPL-MCNC: 9.8 MG/DL (ref 8.4–10.2)
CHLORIDE SERPL-SCNC: 101 MMOL/L (ref 98–111)
CO2 SERPL-SCNC: 29 MMOL/L (ref 23–31)
CREAT SERPL-MCNC: 1.53 MG/DL (ref 0.55–1.02)
CREAT/UREA NIT SERPL: 51
EOSINOPHIL # BLD AUTO: 0.08 X10(3)/MCL (ref 0–0.9)
EOSINOPHIL NFR BLD AUTO: 0.8 %
ERYTHROCYTE [DISTWIDTH] IN BLOOD BY AUTOMATED COUNT: 14.1 % (ref 11.5–17)
GAMMA GLOBULIN % (OHS): 19.82 (ref 10.1–21.9)
GAMMA GLOBULIN (OHS): 1.19 GM/DL (ref 0.4–1.8)
GFR SERPLBLD CREATININE-BSD FMLA CKD-EPI: 32 ML/MIN/1.73/M2
GLOBULIN SER-MCNC: 3.6 GM/DL (ref 2.4–3.5)
GLOBULIN SER-MCNC: 3.6 GM/DL (ref 2.4–3.5)
GLUCOSE SERPL-MCNC: 184 MG/DL (ref 75–121)
HCT VFR BLD AUTO: 46.8 % (ref 37–47)
HGB BLD-MCNC: 15.1 G/DL (ref 12–16)
IMM GRANULOCYTES # BLD AUTO: 0.11 X10(3)/MCL (ref 0–0.04)
IMM GRANULOCYTES NFR BLD AUTO: 1.1 %
KAPPA LC FREE SER NEPH-MCNC: 4.45 MG/DL (ref 0.33–1.94)
KAPPA LC FREE/LAMBDA FREE SER NEPH: 1.31 {RATIO} (ref 0.26–1.65)
LAMBDA LC FREE SER NEPH-MCNC: 3.39 MG/DL (ref 0.57–2.63)
LYMPHOCYTES # BLD AUTO: 0.69 X10(3)/MCL (ref 0.6–4.6)
LYMPHOCYTES NFR BLD AUTO: 7 %
M SPIKE % (OHS): ABNORMAL
M SPIKE (OHS): ABNORMAL
MCH RBC QN AUTO: 30.8 PG (ref 27–31)
MCHC RBC AUTO-ENTMCNC: 32.3 G/DL (ref 33–36)
MCV RBC AUTO: 95.5 FL (ref 80–94)
MONOCYTES # BLD AUTO: 0.27 X10(3)/MCL (ref 0.1–1.3)
MONOCYTES NFR BLD AUTO: 2.7 %
NEUTROPHILS # BLD AUTO: 8.71 X10(3)/MCL (ref 2.1–9.2)
NEUTROPHILS NFR BLD AUTO: 88.3 %
NRBC BLD AUTO-RTO: 0 %
PATH REV: NORMAL
PLATELET # BLD AUTO: 277 X10(3)/MCL (ref 130–400)
PMV BLD AUTO: 10.7 FL (ref 7.4–10.4)
POTASSIUM SERPL-SCNC: 4.5 MMOL/L (ref 3.5–5.1)
PROT SERPL-MCNC: 6 GM/DL (ref 5.8–7.6)
PROT SERPL-MCNC: 6.5 GM/DL (ref 5.8–7.6)
RBC # BLD AUTO: 4.9 X10(6)/MCL (ref 4.2–5.4)
SODIUM SERPL-SCNC: 141 MMOL/L (ref 136–145)
WBC # BLD AUTO: 9.87 X10(3)/MCL (ref 4.5–11.5)

## 2024-09-09 PROCEDURE — 97530 THERAPEUTIC ACTIVITIES: CPT | Mod: CQ

## 2024-09-09 PROCEDURE — 63600175 PHARM REV CODE 636 W HCPCS: Performed by: INTERNAL MEDICINE

## 2024-09-09 PROCEDURE — 99232 SBSQ HOSP IP/OBS MODERATE 35: CPT | Mod: ,,, | Performed by: INTERNAL MEDICINE

## 2024-09-09 PROCEDURE — 25000003 PHARM REV CODE 250: Performed by: INTERNAL MEDICINE

## 2024-09-09 PROCEDURE — 80053 COMPREHEN METABOLIC PANEL: CPT | Performed by: INTERNAL MEDICINE

## 2024-09-09 PROCEDURE — 36415 COLL VENOUS BLD VENIPUNCTURE: CPT | Performed by: INTERNAL MEDICINE

## 2024-09-09 PROCEDURE — 97535 SELF CARE MNGMENT TRAINING: CPT

## 2024-09-09 PROCEDURE — 85025 COMPLETE CBC W/AUTO DIFF WBC: CPT | Performed by: INTERNAL MEDICINE

## 2024-09-09 PROCEDURE — 25000003 PHARM REV CODE 250: Performed by: STUDENT IN AN ORGANIZED HEALTH CARE EDUCATION/TRAINING PROGRAM

## 2024-09-09 PROCEDURE — 21400001 HC TELEMETRY ROOM

## 2024-09-09 RX ORDER — POLYETHYLENE GLYCOL 3350 17 G/17G
17 POWDER, FOR SOLUTION ORAL DAILY PRN
Status: DISCONTINUED | OUTPATIENT
Start: 2024-09-09 | End: 2024-09-10 | Stop reason: HOSPADM

## 2024-09-09 RX ADMIN — SPIRONOLACTONE 25 MG: 25 TABLET ORAL at 09:09

## 2024-09-09 RX ADMIN — POLYETHYLENE GLYCOL 3350 17 G: 17 POWDER, FOR SOLUTION ORAL at 09:09

## 2024-09-09 RX ADMIN — BUMETANIDE 2 MG: 1 TABLET ORAL at 09:09

## 2024-09-09 RX ADMIN — TRAMADOL HYDROCHLORIDE 50 MG: 50 TABLET, COATED ORAL at 09:09

## 2024-09-09 RX ADMIN — TRAMADOL HYDROCHLORIDE 50 MG: 50 TABLET, COATED ORAL at 06:09

## 2024-09-09 RX ADMIN — Medication 6 MG: at 09:09

## 2024-09-09 RX ADMIN — SERTRALINE HYDROCHLORIDE 50 MG: 50 TABLET ORAL at 09:09

## 2024-09-09 RX ADMIN — APIXABAN 2.5 MG: 2.5 TABLET, FILM COATED ORAL at 09:09

## 2024-09-09 RX ADMIN — METHIMAZOLE 2.5 MG: 5 TABLET ORAL at 09:09

## 2024-09-09 RX ADMIN — COLCHICINE 0.6 MG: 0.6 CAPSULE ORAL at 09:09

## 2024-09-09 RX ADMIN — METHYLPREDNISOLONE SODIUM SUCCINATE 80 MG: 40 INJECTION, POWDER, FOR SOLUTION INTRAMUSCULAR; INTRAVENOUS at 09:09

## 2024-09-09 NOTE — ASSESSMENT & PLAN NOTE
- Bilateral wrist and shoulder pain, no recent trauma.  Likely gout flare-up  - She had allergy to allopurinol.  Give another dose of IV steroid, continue colchicine initiated 9/6, X-ray L wrist negative for fractures 9/6

## 2024-09-09 NOTE — ASSESSMENT & PLAN NOTE
- BNP elevated  - CXR showed cardiomegaly and questionable pneumonia.  CT chest 9/4 showed chronic changes, no clear-cut pneumonia.  No fever, no cough, no longer on oxygen.  - Echo 9/4 EF 40-45%  - She was given a dose of IV Lasix 9/3 and 9/4  - Continue spironolactone 25 mg.  At home, she is on bumetanide 2 mg in the morning and 1 mg at lunchtime.  At home lately, she was not been taking her noon dose of bumetanide  - Lower extremity swelling has improved since admit.  Consulted case management for swing bed placement, [place] preferred near her house

## 2024-09-09 NOTE — SUBJECTIVE & OBJECTIVE
Interval History: L wrist pain, + constipation    Review of Systems   Unable to perform ROS: Other     Objective:     Vital Signs (Most Recent):  Temp: 97.5 °F (36.4 °C) (09/09/24 0717)  Pulse: 67 (09/09/24 0717)  Resp: 17 (09/09/24 0631)  BP: 105/68 (09/09/24 0717)  SpO2: 95 % (09/09/24 0717) Vital Signs (24h Range):  Temp:  [97.5 °F (36.4 °C)-98.3 °F (36.8 °C)] 97.5 °F (36.4 °C)  Pulse:  [65-81] 67  Resp:  [17-20] 17  SpO2:  [93 %-96 %] 95 %  BP: (102-147)/(68-74) 105/68     Weight: 78.1 kg (172 lb 2.9 oz)  Body mass index is 30.5 kg/m².    Intake/Output Summary (Last 24 hours) at 9/9/2024 0752  Last data filed at 9/9/2024 0537  Gross per 24 hour   Intake 160 ml   Output 1350 ml   Net -1190 ml         Physical Exam  Constitutional:       General: She is not in acute distress.  Cardiovascular:      Rate and Rhythm: Normal rate.   Pulmonary:      Effort: No respiratory distress.   Musculoskeletal:      Right lower leg: No edema.      Left lower leg: No edema.      Comments: L wrist swelling             Significant Labs: All pertinent labs within the past 24 hours have been reviewed.    Significant Imaging: I have reviewed all pertinent imaging results/findings within the past 24 hours.

## 2024-09-09 NOTE — PT/OT/SLP PROGRESS
Physical Therapy Treatment    Patient Name:  Baudilio Mantilla   MRN:  [id number]    Recommendations:     Discharge therapy intensity: Moderate Intensity Therapy   Discharge Equipment Recommendations: to be determined by next level of care  Barriers to discharge: Ongoing medical needs and placement.    Assessment:     Baudilio Mantilla is a 89 y.o. female admitted with a medical diagnosis of CHF exac, paroxysmal atrial fibrillation, CKD, and gout.  She presents with the following impairments/functional limitations: weakness, impaired endurance, impaired balance, gait instability, impaired self care skills, impaired functional mobility, decreased safety awareness, pain, decreased upper extremity function.    Pt total-maxA for all functional mobility. She demo'd increased anxiety during t/fs w/increased respiratory rate; ~1-2min to regulate breathing. She spoke very few word during tx session and would respond appropriately to questions w/ yes/no nodding.  Spoke to evaluating PT and RN about changes.    Rehab Prognosis: Fair; patient would benefit from acute skilled PT services to address these deficits and reach maximum level of function.    Recent Surgery: * No surgery found *      Plan:     During this hospitalization, patient would benefit from acute PT services 5 x/week to address the identified rehab impairments via gait training, therapeutic activities, therapeutic exercises and progress toward the following goals:    Plan of Care Expires:  10/04/24    Subjective     Chief Complaint:   Patient/Family Comments/goals:   Pain/Comfort:         Objective:     Communicated with RN prior to session.  Patient found HOB elevated with PureWick, telemetry upon PT entry to room.     General Precautions: Standard, fall  Orthopedic Precautions: N/A  Braces: N/A  Respiratory Status: Room air  Blood Pressure: 125/74  Skin Integrity:  Swollen L wrist; Xray negative for fx      Functional Mobility:  Bed Mobility:     Rolling Left:   total assistance  Rolling Right: total assistance  Scooting: total assistance  Supine to Sit: total assistance  Sit to Supine: total assistance  Balance: MaxA for static sitting EOB    Therapeutic Activities/Exercises:  Pt sat EOB ~10min maxA w/posterior lean; verbal and tactile cues to anterior WS, but pt would resist; vc to flex knees and place feet on the ground, but she stayed extended.  Pt perf feeding maxA while sitting EOB.    Education:  Patient provided with verbal education education regarding PT role/goals/POC, fall prevention, and safety awareness.  Understanding was verbalized.     Patient left HOB elevated with all lines intact and call button in reach    GOALS:   Multidisciplinary Problems       Physical Therapy Goals          Problem: Physical Therapy    Goal Priority Disciplines Outcome Goal Variances Interventions   Physical Therapy Goal     PT, PT/OT Progressing     Description: Goals to be met by: 10/5/24     Patient will increase functional independence with mobility by performin. Supine to sit with Modified [place]  2. Sit to supine with Modified [place]  3. Sit to stand transfer with Stand-by Assistance  4. Bed to chair transfer with Stand-by Assistance using Rolling Walker  5. Gait  x 100 feet with Contact Guard Assistance using Rolling Walker.                          Time Tracking:     PT Received On: 24  PT Start Time: 955     PT Stop Time: 1033  PT Total Time (min): 38 min     Billable Minutes: Therapeutic Activity 38    Treatment Type: Treatment  PT/PTA: PTA     Number of PTA visits since last PT visit: 2     2024

## 2024-09-09 NOTE — PROGRESS NOTES
[facility] - 4th Floor [facility] Medicine  Progress Note    Patient Name: Baudilio Mantilla  MRN: [id number]  Patient Class: IP- Inpatient   Admission Date: 9/3/2024  Length of Stay: 6 days  Attending Physician: Saman Kearns II, MD  Primary Care Provider: Saman Kearns II, MD        Subjective:     Principal Problem:Acute on chronic systolic (congestive) heart failure        HPI:  89 year old white female with history of CHF presented to the ED 9/3 for generalized weakness and SOB. Patient sleeps in her recliner because of CHF and was not able to get out of the chair due to generalized weakness. Patient also reports bilateral shoulder and wrist pain over the past 1-2 weeks. She is on Bumex and spironolactone and has been compliant with her medications. She reports worsening leg swelling and SOB.  BNP was elevated, and chest x-ray showed cardiomegaly.  She was given a dose of IV Lasix last night.  She is currently on 1 L of oxygen with oxygen saturation of 98%.    She has a past medical history of chronic systolic heart failure, paroxysmal atrial fibrillation, gout, hypertension, hyperparathyroidism among other conditions.  She lives alone.  She denies alcohol, tobacco, or drug use.  See below for more details of social history, family history, past medical history etc    Overview/Hospital Course:  No notes on file    Interval History: L wrist pain, + constipation    Review of Systems   Unable to perform ROS: Other     Objective:     Vital Signs (Most Recent):  Temp: 97.5 °F (36.4 °C) (09/09/24 0717)  Pulse: 67 (09/09/24 0717)  Resp: 17 (09/09/24 0631)  BP: 105/68 (09/09/24 0717)  SpO2: 95 % (09/09/24 0717) Vital Signs (24h Range):  Temp:  [97.5 °F (36.4 °C)-98.3 °F (36.8 °C)] 97.5 °F (36.4 °C)  Pulse:  [65-81] 67  Resp:  [17-20] 17  SpO2:  [93 %-96 %] 95 %  BP: (102-147)/(68-74) 105/68     Weight: 78.1 kg (172 lb 2.9 oz)  Body mass index is 30.5 kg/m².    Intake/Output Summary  (Last 24 hours) at 9/9/2024 0752  Last data filed at 9/9/2024 0537  Gross per 24 hour   Intake 160 ml   Output 1350 ml   Net -1190 ml         Physical Exam  Constitutional:       General: She is not in acute distress.  Cardiovascular:      Rate and Rhythm: Normal rate.   Pulmonary:      Effort: No respiratory distress.   Musculoskeletal:      Right lower leg: No edema.      Left lower leg: No edema.      Comments: L wrist swelling             Significant Labs: All pertinent labs within the past 24 hours have been reviewed.    Significant Imaging: I have reviewed all pertinent imaging results/findings within the past 24 hours.    Assessment/Plan:      * Acute on chronic systolic (congestive) heart failure  - BNP elevated  - CXR showed cardiomegaly and questionable pneumonia.  CT chest 9/4 showed chronic changes, no clear-cut pneumonia.  No fever, no cough, no longer on oxygen.  - Echo 9/4 EF 40-45%  - She was given a dose of IV Lasix 9/3 and 9/4  - Continue spironolactone 25 mg.  At home, she is on bumetanide 2 mg in the morning and 1 mg at lunchtime.  At home lately, she was not been taking her noon dose of bumetanide  - Lower extremity swelling has improved since admit.  Consulted case management for swing bed placement, [facility] near her house    Paroxysmal atrial fibrillation  - Diagnosed in 2015, had digoxin toxicity in 2016 and was in the ICU with heart rate in the 20s and 30s. At that time, she was on digoxin, amiodarone, and Cardizem. Pacemaker placed in 2020, no longer on metoprolol  - Rate controlled  - Eliquis 2.5 mg    Chronic kidney disease (CKD), stage IV (severe)  - Nephrology following, renal ultrasound shows chronic disease  - Creatinine improving; BUN above 80 (received steroids past few days)    Primary hypertension  - Stable    Gout of multiple sites  - Bilateral wrist and shoulder pain, no recent trauma.  Likely gout flare-up  - She had allergy to allopurinol.  Give another dose of IV  steroid, continue colchicine initiated 9/6, X-ray L wrist negative for fractures 9/6    Hyperthyroidism  - On methimazole 2.5 mg daily  - TSH and free T4 normal      VTE Risk Mitigation (From admission, onward)           Ordered     apixaban tablet 2.5 mg  2 times daily         09/04/24 0744     IP VTE HIGH RISK PATIENT  Once         09/03/24 2159     Place sequential compression device  Until discontinued         09/03/24 2159                    Discharge Planning   SAMARIA:      Code Status: DNR   Is the patient medically ready for discharge?:     Reason for patient still in hospital (select all that apply): Treatment  Discharge Plan A: Skilled Nursing Facility                  Saman Kearns II, MD  Department of Hospital Medicine   [facility] - 4th Floor Medical Telemetry

## 2024-09-09 NOTE — PT/OT/SLP PROGRESS
Occupational Therapy   Treatment    Name: Baudilio Mantilla  MRN: [id number]  Admitting Diagnosis:  Acute on chronic systolic (congestive) heart failure       Recommendations:     Recommended therapy intensity at discharge: Moderate Intensity Therapy   Discharge Equipment Recommendations:  to be determined by next level of care  Barriers to discharge:   (ongoing medical needs)    Assessment:     Baudilio Mantilla is a 89 y.o. female with a medical diagnosis of chronic CHF, paroxysmal atrial fibrillation, CKD, and gout.  She was lethargic throughout session, having difficulty communicating needs, and required cues for diaphragmatic breathing-- nurse notified of this change in status. Performance deficits affecting function are weakness, impaired endurance, impaired self care skills, impaired functional mobility, gait instability, impaired balance, pain, decreased safety awareness, decreased lower extremity function, decreased upper extremity function. Patient required total A for all mobility, oral hygiene, and toileting. Patient required max A for feeding. She continues to have pain in L wrist--xray negative.     Rehab Prognosis:  Fair; patient would benefit from acute skilled OT services to address these deficits and reach maximum level of function.       Plan:     Patient to be seen 5 x/week to address the above listed problems via self-care/home management, therapeutic activities, therapeutic exercises  Plan of Care Expires: 10/05/24  Plan of Care Reviewed with: patient    Subjective     Pain/Comfort:  Location - Side 1: Left  Location 1: wrist  Pain Addressed 1: Reposition, Distraction, Cessation of Activity, Nurse notified    Objective:     Communicated with: nurse prior to session.  Patient found HOB elevated with peripheral IV, telemetry, pulse ox (continuous), PureWick upon OT entry to room.    General Precautions: Standard, fall    Orthopedic Precautions:N/A  Braces: N/A  Respiratory Status: Room air  Vital Signs:  Blood Pressure: 125/74 sitting EOB     Occupational Performance:     Bed Mobility:    Patient completed Rolling/Turning to Left with  total assistance  Patient completed Rolling/Turning to Right with total assistance  Patient completed Supine to Sit with total assistance  Patient completed Sit to Supine with total assistance     Functional Mobility/Transfers:  Transfers not safe this session    Activities of Daily Living:  Feeding:  maximal assistance bringing cup and fork to mouth; poor intake  Grooming: total assistance oral care performed with swab  Toileting: total assistance purewick doffed/donned with dheeraj care while sidelying    Therapeutic Activities:  Sitting EOB with mod/max A with posterior push; would not bend knees to place feet on floor     Patient Education:  Patient provided with verbal education education regarding OT role/goals/POC, fall prevention, safety awareness, Discharge/DME recommendations, and pressure ulcer prevention.  Additional teaching is warranted.      Patient left left sidelying with all lines intact, call button in reach, wedge under R side, nurse notified, and  present.    GOALS:   Multidisciplinary Problems       Occupational Therapy Goals          Problem: Occupational Therapy    Goal Priority Disciplines Outcome Interventions   Occupational Therapy Goal     OT, PT/OT Progressing    Description: LTG: Pt will perform basic ADLs and ADL transfers with Modified independence using LRAD by discharge.    STG: to be met by 10/5/24    Pt will complete grooming standing at sink with LRAD with SBA.  Pt will complete UB dressing with SBA.  Pt will complete LB dressing with SBA using LRAD and AE prn.  Pt will complete toileting with SBA using LRAD.  Pt will complete functional mobility to/from toilet and toilet transfer with SBA using LRAD.                        Time Tracking:     OT Date of Treatment: 09/09/24  OT Start Time: 0958  OT Stop Time: 1033  OT Total Time (min): 35  min    Billable Minutes:Self Care/Home Management 35    OT/KATHY: OT     Number of KATHY visits since last OT visit: 2    9/9/2024

## 2024-09-10 ENCOUNTER — HOSPITAL ENCOUNTER (INPATIENT)
Facility: HOSPITAL | Age: 89
LOS: 7 days | Discharge: HOME | DRG: 291 | End: 2024-09-17
Attending: INTERNAL MEDICINE | Admitting: INTERNAL MEDICINE
Payer: MEDICARE

## 2024-09-10 VITALS
OXYGEN SATURATION: 94 % | RESPIRATION RATE: 18 BRPM | SYSTOLIC BLOOD PRESSURE: 143 MMHG | TEMPERATURE: 98 F | DIASTOLIC BLOOD PRESSURE: 76 MMHG | WEIGHT: 172.19 LBS | BODY MASS INDEX: 30.51 KG/M2 | HEIGHT: 63 IN | HEART RATE: 79 BPM

## 2024-09-10 DIAGNOSIS — R07.9 CHEST PAIN: ICD-10-CM

## 2024-09-10 DIAGNOSIS — I10 HYPERTENSION, UNSPECIFIED TYPE: ICD-10-CM

## 2024-09-10 DIAGNOSIS — Z00.00 WELLNESS EXAMINATION: ICD-10-CM

## 2024-09-10 DIAGNOSIS — I50.23 ACUTE ON CHRONIC SYSTOLIC HEART FAILURE: ICD-10-CM

## 2024-09-10 DIAGNOSIS — E05.90 HYPERTHYROIDISM: ICD-10-CM

## 2024-09-10 LAB
ALBUMIN SERPL-MCNC: 2.7 G/DL (ref 3.4–4.8)
ALBUMIN/GLOB SERPL: 0.8 RATIO (ref 1.1–2)
ALP SERPL-CCNC: 92 UNIT/L (ref 40–150)
ALT SERPL-CCNC: 38 UNIT/L (ref 0–55)
ANION GAP SERPL CALC-SCNC: 12 MEQ/L
AST SERPL-CCNC: 30 UNIT/L (ref 5–34)
BACTERIA #/AREA URNS AUTO: NORMAL /HPF
BASOPHILS # BLD AUTO: 0.02 X10(3)/MCL
BASOPHILS NFR BLD AUTO: 0.1 %
BILIRUB SERPL-MCNC: 1 MG/DL
BILIRUB UR QL STRIP.AUTO: NEGATIVE
BUN SERPL-MCNC: 90 MG/DL (ref 9.8–20.1)
CALCIUM SERPL-MCNC: 9.6 MG/DL (ref 8.4–10.2)
CHLORIDE SERPL-SCNC: 101 MMOL/L (ref 98–111)
CLARITY UR: CLEAR
CO2 SERPL-SCNC: 24 MMOL/L (ref 23–31)
COLOR UR AUTO: ABNORMAL
CREAT SERPL-MCNC: 1.75 MG/DL (ref 0.55–1.02)
CREAT/UREA NIT SERPL: 51
EOSINOPHIL # BLD AUTO: 0.04 X10(3)/MCL (ref 0–0.9)
EOSINOPHIL NFR BLD AUTO: 0.3 %
ERYTHROCYTE [DISTWIDTH] IN BLOOD BY AUTOMATED COUNT: 13.9 % (ref 11.5–17)
GFR SERPLBLD CREATININE-BSD FMLA CKD-EPI: 27 ML/MIN/1.73/M2
GLOBULIN SER-MCNC: 3.4 GM/DL (ref 2.4–3.5)
GLUCOSE SERPL-MCNC: 145 MG/DL (ref 75–121)
GLUCOSE UR QL STRIP: NEGATIVE
HCT VFR BLD AUTO: 43.7 % (ref 37–47)
HGB BLD-MCNC: 14.7 G/DL (ref 12–16)
HGB UR QL STRIP: NEGATIVE
IMM GRANULOCYTES # BLD AUTO: 0.13 X10(3)/MCL (ref 0–0.04)
IMM GRANULOCYTES NFR BLD AUTO: 0.9 %
KETONES UR QL STRIP: NEGATIVE
LEUKOCYTE ESTERASE UR QL STRIP: ABNORMAL
LYMPHOCYTES # BLD AUTO: 1.72 X10(3)/MCL (ref 0.6–4.6)
LYMPHOCYTES NFR BLD AUTO: 11.7 %
MCH RBC QN AUTO: 31.5 PG (ref 27–31)
MCHC RBC AUTO-ENTMCNC: 33.6 G/DL (ref 33–36)
MCV RBC AUTO: 93.8 FL (ref 80–94)
MONOCYTES # BLD AUTO: 1.03 X10(3)/MCL (ref 0.1–1.3)
MONOCYTES NFR BLD AUTO: 7 %
NEUTROPHILS # BLD AUTO: 11.82 X10(3)/MCL (ref 2.1–9.2)
NEUTROPHILS NFR BLD AUTO: 80 %
NITRITE UR QL STRIP: NEGATIVE
NRBC BLD AUTO-RTO: 0 %
PH UR STRIP: 6 [PH]
PLATELET # BLD AUTO: 271 X10(3)/MCL (ref 130–400)
PMV BLD AUTO: 10.9 FL (ref 7.4–10.4)
POTASSIUM SERPL-SCNC: 4.7 MMOL/L (ref 3.5–5.1)
PROT SERPL-MCNC: 6.1 GM/DL (ref 5.8–7.6)
PROT UR QL STRIP: NEGATIVE
RBC # BLD AUTO: 4.66 X10(6)/MCL (ref 4.2–5.4)
RBC #/AREA URNS AUTO: NORMAL /HPF
SODIUM SERPL-SCNC: 137 MMOL/L (ref 136–145)
SP GR UR STRIP.AUTO: 1.01 (ref 1–1.03)
SQUAMOUS #/AREA URNS AUTO: NORMAL /HPF
UROBILINOGEN UR STRIP-ACNC: 1
WBC # BLD AUTO: 14.76 X10(3)/MCL (ref 4.5–11.5)
WBC #/AREA URNS AUTO: NORMAL /HPF

## 2024-09-10 PROCEDURE — 25000003 PHARM REV CODE 250: Performed by: NURSE PRACTITIONER

## 2024-09-10 PROCEDURE — 85025 COMPLETE CBC W/AUTO DIFF WBC: CPT | Performed by: INTERNAL MEDICINE

## 2024-09-10 PROCEDURE — 97535 SELF CARE MNGMENT TRAINING: CPT

## 2024-09-10 PROCEDURE — 63600175 PHARM REV CODE 636 W HCPCS: Performed by: INTERNAL MEDICINE

## 2024-09-10 PROCEDURE — 25000003 PHARM REV CODE 250: Performed by: INTERNAL MEDICINE

## 2024-09-10 PROCEDURE — 25000003 PHARM REV CODE 250: Performed by: PHYSICIAN ASSISTANT

## 2024-09-10 PROCEDURE — 81001 URINALYSIS AUTO W/SCOPE: CPT | Performed by: PHYSICIAN ASSISTANT

## 2024-09-10 PROCEDURE — 97530 THERAPEUTIC ACTIVITIES: CPT | Mod: CQ

## 2024-09-10 PROCEDURE — 94760 N-INVAS EAR/PLS OXIMETRY 1: CPT

## 2024-09-10 PROCEDURE — 99900035 HC TECH TIME PER 15 MIN (STAT)

## 2024-09-10 PROCEDURE — 81003 URINALYSIS AUTO W/O SCOPE: CPT | Performed by: PHYSICIAN ASSISTANT

## 2024-09-10 PROCEDURE — 11000004 HC SNF PRIVATE

## 2024-09-10 PROCEDURE — 80053 COMPREHEN METABOLIC PANEL: CPT | Performed by: INTERNAL MEDICINE

## 2024-09-10 PROCEDURE — 99239 HOSP IP/OBS DSCHRG MGMT >30: CPT | Mod: ,,, | Performed by: INTERNAL MEDICINE

## 2024-09-10 PROCEDURE — 36415 COLL VENOUS BLD VENIPUNCTURE: CPT | Performed by: INTERNAL MEDICINE

## 2024-09-10 RX ORDER — TRAMADOL HYDROCHLORIDE 50 MG/1
50 TABLET ORAL EVERY 6 HOURS PRN
Status: DISCONTINUED | OUTPATIENT
Start: 2024-09-10 | End: 2024-09-17 | Stop reason: HOSPADM

## 2024-09-10 RX ORDER — SPIRONOLACTONE 25 MG/1
25 TABLET ORAL DAILY
Status: DISCONTINUED | OUTPATIENT
Start: 2024-09-11 | End: 2024-09-17 | Stop reason: HOSPADM

## 2024-09-10 RX ORDER — TALC
9 POWDER (GRAM) TOPICAL NIGHTLY PRN
Status: DISCONTINUED | OUTPATIENT
Start: 2024-09-10 | End: 2024-09-17 | Stop reason: HOSPADM

## 2024-09-10 RX ORDER — ONDANSETRON HYDROCHLORIDE 2 MG/ML
4 INJECTION, SOLUTION INTRAVENOUS EVERY 8 HOURS PRN
Status: DISCONTINUED | OUTPATIENT
Start: 2024-09-10 | End: 2024-09-17 | Stop reason: HOSPADM

## 2024-09-10 RX ORDER — SODIUM CHLORIDE 0.9 % (FLUSH) 0.9 %
10 SYRINGE (ML) INJECTION
Status: DISCONTINUED | OUTPATIENT
Start: 2024-09-10 | End: 2024-09-17 | Stop reason: HOSPADM

## 2024-09-10 RX ORDER — MORPHINE SULFATE 4 MG/ML
2 INJECTION, SOLUTION INTRAMUSCULAR; INTRAVENOUS EVERY 6 HOURS PRN
Status: DISCONTINUED | OUTPATIENT
Start: 2024-09-10 | End: 2024-09-17 | Stop reason: HOSPADM

## 2024-09-10 RX ORDER — METOPROLOL SUCCINATE 25 MG/1
12.5 TABLET, EXTENDED RELEASE ORAL DAILY
Qty: 45 TABLET | Refills: 3 | Status: ON HOLD | OUTPATIENT
Start: 2024-09-11 | End: 2024-09-17 | Stop reason: HOSPADM

## 2024-09-10 RX ORDER — COLCHICINE 0.6 MG/1
0.6 TABLET ORAL DAILY
Qty: 30 TABLET | Refills: 11 | Status: ON HOLD | OUTPATIENT
Start: 2024-09-11 | End: 2024-09-17

## 2024-09-10 RX ORDER — NALOXONE HCL 0.4 MG/ML
0.02 VIAL (ML) INJECTION
Status: DISCONTINUED | OUTPATIENT
Start: 2024-09-10 | End: 2024-09-17 | Stop reason: HOSPADM

## 2024-09-10 RX ORDER — BISACODYL 10 MG/1
10 SUPPOSITORY RECTAL DAILY PRN
Status: DISCONTINUED | OUTPATIENT
Start: 2024-09-10 | End: 2024-09-17 | Stop reason: HOSPADM

## 2024-09-10 RX ORDER — ONDANSETRON 4 MG/1
8 TABLET, ORALLY DISINTEGRATING ORAL EVERY 8 HOURS PRN
Status: DISCONTINUED | OUTPATIENT
Start: 2024-09-10 | End: 2024-09-17 | Stop reason: HOSPADM

## 2024-09-10 RX ORDER — SERTRALINE HYDROCHLORIDE 50 MG/1
50 TABLET, FILM COATED ORAL DAILY
Status: DISCONTINUED | OUTPATIENT
Start: 2024-09-11 | End: 2024-09-17 | Stop reason: HOSPADM

## 2024-09-10 RX ORDER — PREDNISONE 20 MG/1
20 TABLET ORAL ONCE
Status: COMPLETED | OUTPATIENT
Start: 2024-09-10 | End: 2024-09-10

## 2024-09-10 RX ORDER — POLYETHYLENE GLYCOL 3350 17 G/17G
17 POWDER, FOR SOLUTION ORAL DAILY PRN
Qty: 30 PACKET | Refills: 0 | Status: SHIPPED | OUTPATIENT
Start: 2024-09-10

## 2024-09-10 RX ORDER — SIMETHICONE 80 MG
1 TABLET,CHEWABLE ORAL 4 TIMES DAILY PRN
Status: DISCONTINUED | OUTPATIENT
Start: 2024-09-10 | End: 2024-09-17 | Stop reason: HOSPADM

## 2024-09-10 RX ORDER — DOXYLAMINE SUCCINATE 25 MG
TABLET ORAL 2 TIMES DAILY
Status: DISCONTINUED | OUTPATIENT
Start: 2024-09-10 | End: 2024-09-17 | Stop reason: HOSPADM

## 2024-09-10 RX ORDER — BUMETANIDE 1 MG/1
2 TABLET ORAL DAILY
Status: DISCONTINUED | OUTPATIENT
Start: 2024-09-11 | End: 2024-09-17 | Stop reason: HOSPADM

## 2024-09-10 RX ORDER — POLYETHYLENE GLYCOL 3350 17 G/17G
17 POWDER, FOR SOLUTION ORAL 3 TIMES DAILY PRN
Status: DISCONTINUED | OUTPATIENT
Start: 2024-09-10 | End: 2024-09-17 | Stop reason: HOSPADM

## 2024-09-10 RX ORDER — ACETAMINOPHEN 325 MG/1
650 TABLET ORAL EVERY 4 HOURS PRN
Status: DISCONTINUED | OUTPATIENT
Start: 2024-09-10 | End: 2024-09-17 | Stop reason: HOSPADM

## 2024-09-10 RX ORDER — BUMETANIDE 2 MG/1
2 TABLET ORAL DAILY
Qty: 30 TABLET | Refills: 11 | Status: ON HOLD | OUTPATIENT
Start: 2024-09-11 | End: 2024-09-17

## 2024-09-10 RX ORDER — ALUMINUM HYDROXIDE, MAGNESIUM HYDROXIDE, AND SIMETHICONE 1200; 120; 1200 MG/30ML; MG/30ML; MG/30ML
30 SUSPENSION ORAL 4 TIMES DAILY PRN
Status: DISCONTINUED | OUTPATIENT
Start: 2024-09-10 | End: 2024-09-17 | Stop reason: HOSPADM

## 2024-09-10 RX ORDER — IPRATROPIUM BROMIDE AND ALBUTEROL SULFATE 2.5; .5 MG/3ML; MG/3ML
3 SOLUTION RESPIRATORY (INHALATION) EVERY 6 HOURS PRN
Status: DISCONTINUED | OUTPATIENT
Start: 2024-09-10 | End: 2024-09-17 | Stop reason: HOSPADM

## 2024-09-10 RX ORDER — BUMETANIDE 1 MG/1
2 TABLET ORAL DAILY
Status: DISCONTINUED | OUTPATIENT
Start: 2024-09-11 | End: 2024-09-10 | Stop reason: HOSPADM

## 2024-09-10 RX ORDER — PREDNISONE 20 MG/1
20 TABLET ORAL DAILY
Qty: 3 TABLET | Refills: 0 | Status: ON HOLD | OUTPATIENT
Start: 2024-09-10 | End: 2024-09-17 | Stop reason: HOSPADM

## 2024-09-10 RX ORDER — HYDROCODONE BITARTRATE AND ACETAMINOPHEN 5; 325 MG/1; MG/1
1 TABLET ORAL EVERY 6 HOURS PRN
Status: DISCONTINUED | OUTPATIENT
Start: 2024-09-10 | End: 2024-09-17 | Stop reason: HOSPADM

## 2024-09-10 RX ADMIN — APIXABAN 2.5 MG: 2.5 TABLET, FILM COATED ORAL at 10:09

## 2024-09-10 RX ADMIN — MICONAZOLE NITRATE: 20 CREAM TOPICAL at 08:09

## 2024-09-10 RX ADMIN — SERTRALINE HYDROCHLORIDE 50 MG: 50 TABLET ORAL at 10:09

## 2024-09-10 RX ADMIN — METHIMAZOLE 2.5 MG: 5 TABLET ORAL at 10:09

## 2024-09-10 RX ADMIN — PREDNISONE 20 MG: 20 TABLET ORAL at 10:09

## 2024-09-10 RX ADMIN — MICONAZOLE NITRATE 2 % TOPICAL POWDER: at 10:09

## 2024-09-10 RX ADMIN — SPIRONOLACTONE 25 MG: 25 TABLET ORAL at 10:09

## 2024-09-10 RX ADMIN — APIXABAN 2.5 MG: 2.5 TABLET, FILM COATED ORAL at 08:09

## 2024-09-10 RX ADMIN — COLCHICINE 0.6 MG: 0.6 CAPSULE ORAL at 10:09

## 2024-09-10 RX ADMIN — METOPROLOL SUCCINATE 12.5 MG: 25 TABLET, EXTENDED RELEASE ORAL at 10:09

## 2024-09-10 NOTE — PLAN OF CARE
Spoke with therapy in regards to transport. Patient is able to sit in chair but will need kenan lift. Cara unable to use lift pad for transport. Transport arranged with [facility].

## 2024-09-10 NOTE — PROGRESS NOTES
Inpatient Nutrition Assessment    Admit Date: 9/3/2024   Total duration of encounter: 7 days   Patient Age: 89 y.o.    Nutrition Recommendation/Prescription     Continue current diet (Diet Cardiac) as tolerated.   Boost Very High Calorie (provides 530 kcal, 22 g protein per serving).  Consider appetite stimulant medication if feasible.    Communication of Recommendations: reviewed with nurse    Nutrition Assessment     Malnutrition Assessment/Nutrition-Focused Physical Exam    Malnutrition Context: other (see comments) (does not meet criteria) (09/04/24 1313)  Malnutrition Level: other (see comments) (does not meet criteria) (09/04/24 1313)  Energy Intake (Malnutrition): other (see comments) (does not meet criteria) (09/04/24 1305)  Weight Loss (Malnutrition): other (see comments) (does not meet crtieria) (09/04/24 1312)  Subcutaneous Fat (Malnutrition): other (see comments) (does not meet criteria) (09/04/24 1313)  Orbital Region (Subcutaneous Fat Loss): well nourished        Muscle Mass (Malnutrition): other (see comments) (does not meet criteria) (09/04/24 1313)  Pentecostalism Region (Muscle Loss): well nourished                       Fluid Accumulation (Malnutrition): other (see comments) (does not meet criteria) (09/04/24 1305)  Hand  Strength, Left (Malnutrition): unable to evaluate (09/04/24 1305)  Hand  Strength, Right (Malnutrition): unable to evaluate (09/04/24 1305)  A minimum of two characteristics is recommended for diagnosis of either severe or non-severe malnutrition.    Chart Review    Reason Seen: continuous nutrition monitoring    Malnutrition Screening Tool Results   Have you recently lost weight without trying?: Unsure  Have you been eating poorly because of a decreased appetite?: No   MST Score: 2   Diagnosis:  Diagnosis:  Acute on chronic systolic (congestive) heart failure  Paroxysmal atrial fibrillation  Chronic kidney disease (CKD), stage IV (severe)  Primary hypertension  Gout of multiple  sites  Hyperthyroidism     Relevant Medical History: CHF    Scheduled Medications:  apixaban, 2.5 mg, BID  [START ON 9/11/2024] bumetanide, 2 mg, Daily  colchicine, 0.6 mg, Daily  methIMAzole, 2.5 mg, Daily  metoprolol succinate, 12.5 mg, Daily  miconazole NITRATE 2 %, , BID  sertraline, 50 mg, Daily  spironolactone, 25 mg, Daily    Continuous Infusions: none       PRN Medications:   melatonin, 6 mg, Nightly PRN  ondansetron, 4 mg, Q8H PRN  polyethylene glycol, 17 g, Daily PRN  sodium chloride 0.9%, 10 mL, PRN  traMADoL, 50 mg, TID PRN    Calorie Containing IV Medications: no significant kcals from medications at this time    Recent Labs   Lab 09/03/24  2208 09/04/24  0351 09/05/24  1644 09/06/24  0507 09/06/24  1502 09/07/24  0545 09/08/24  0543 09/09/24  1248 09/10/24  0503   NA  --  136 135* 135*  --  139 141 141 137   K  --  3.7 4.2 4.2  --  3.9 3.9 4.5 4.7   CALCIUM  --  10.3* 10.0 10.0  --  10.0 10.3* 9.8 9.6   MG 2.10  --  2.30 2.40  --   --   --   --   --    CL  --  101 102 102  --  105 102 101 101   CO2  --  23 23 24  --  24 29 29 24   BUN  --  79.0* 85.6* 84.3*  --  89.7* 85.3* 77.3* 90.0*   CREATININE  --  1.79* 1.65* 1.48*  --  1.28* 1.33* 1.53* 1.75*   EGFRNORACEVR  --  26 29 33  --  39 38 32 27   GLUCOSE  --  101 180* 131*  --  123* 103 184* 145*   BILITOT  --   --  0.7 0.6  --  0.9  --  1.4 1.0   ALKPHOS  --   --  82 77  --  86  --  93 92   ALT  --   --  11 11  --  34  --  42 38   AST  --   --  17 19  --  44*  --  31 30   ALBUMIN  --   --  2.7* 2.6* 2.4* 2.7*  --  2.9* 2.7*   WBC  --  11.65* 10.36 9.25  --  9.58  --  9.87 14.76*   HGB  --  12.5 12.8 12.3  --  13.0  --  15.1 14.7   HCT  --  37.3 38.7 37.2  --  38.7  --  46.8 43.7     Nutrition Orders:  Diet Cardiac  Dietary nutrition supplements BID; Boost Very High Calorie Nutritional Drink - Any flavor    Appetite/Oral Intake: poor/0-25% of meals  Factors Affecting Nutritional Intake: decreased appetite  Social Needs Impacting Access to Food: none  "identified  Food/Buddhist/Cultural Preferences: none reported  Food Allergies: no known food allergies  Last Bowel Movement: 24  Wound(s):     Wound 24 1255 Pressure Injury Coccyx-Tissue loss description: Not applicable    Comments    24: Pt reports good appetite and usual good intake/appetite prior to admit; Pt denies n/v and reports a usual wt of ~182 lbs.     24 Consult received for low appetite, patient reported good appetite yesterday per nutrition assessment, nurse reports she ate about 50% of breakfast this morning, will add Boost Very High Calorie (provides 530 kcal, 22 g protein per serving) BID to supplement.     9/10/24 Plans for discharge to swing bed facility today, nurse reports poor intake.    Anthropometrics    Height: 5' 3" (160 cm), Height Method: Stated  Last Weight: 78.1 kg (172 lb 2.9 oz) (24 0500), Weight Method: Bed Scale  BMI (Calculated): 30.5  BMI Classification: obese grade I (BMI 30-34.9)     Ideal Body Weight (IBW), Female: 115 lb     % Ideal Body Weight, Female (lb): 155.86 %                    Usual Body Weight (UBW), k.8 kg  % Usual Body Weight: 99.6  % Weight Change From Usual Weight: -0.61 %  Usual Weight Provided By: patient    Wt Readings from Last 5 Encounters:   24 78.1 kg (172 lb 2.9 oz)   24 69.9 kg (154 lb)   24 69.9 kg (154 lb)   23 81.6 kg (180 lb)   23 79.4 kg (175 lb)     Weight Change(s) Since Admission:   (9/10) increase/edema noted  Wt Readings from Last 1 Encounters:   24 0500 78.1 kg (172 lb 2.9 oz)   24 0506 79.9 kg (176 lb 2.4 oz)   24 0618 81.3 kg (179 lb 3.7 oz)   24 0557 80.9 kg (178 lb 5.6 oz)   24 1318 81.3 kg (179 lb 3.7 oz)   24 1304 81.3 kg (179 lb 3.7 oz)   24 1302 69.9 kg (154 lb)   24 1344 69.9 kg (154 lb)   Admit Weight: 69.9 kg (154 lb) (24 1344), Weight Method: Stated    Estimated Needs    Weight Used For Calorie Calculations: 69.9 kg " (154 lb 1.6 oz)  Energy Calorie Requirements (kcal): 5942-3681 kcal (25-30 kcal/kg)  Energy Need Method: Kcal/kg  Weight Used For Protein Calculations: 69.9 kg (154 lb 1.6 oz)  Protein Requirements: 70 gm (1g/kg)  Fluid Requirements (mL): 1748 mL        Enteral Nutrition Patient not receiving enteral nutrition at this time.    Parenteral Nutrition Patient not receiving parenteral nutrition support at this time.    Evaluation of Received Nutrient Intake    Calories: not meeting estimated needs  Protein: not meeting estimated needs    Patient Education Not applicable.    Nutrition Diagnosis     PES: Inadequate energy intake related to inability to consume sufficient nutrients as evidenced by less than 80% needs met. (new)    Nutrition Interventions     Intervention(s): modified composition of meals/snacks, commercial beverage, and collaboration with other providers  Goal: Meet greater than 80% of nutritional needs by follow-up. (new)    Nutrition Goals & Monitoring     Dietitian will monitor: food and beverage intake and weight  Discharge planning: continue current diet with current oral supplements  Nutrition Risk/Follow-Up: high (follow-up in 1-4 days)   Please consult if re-assessment needed sooner.

## 2024-09-10 NOTE — PLAN OF CARE
09/10/24 1134   Final Note   Assessment Type Final Discharge Note   Anticipated Discharge Disposition Swing Bed   Post-Acute Status   Post-Acute Authorization Placement   Post-Acute Placement Status Set-up Complete/Auth obtained   Discharge Delays None known at this time     Received message from Siomara at [facility]. Insurance authorization has been obtained and patient can admit today. Discharge orders obtained. Transport scheduled with [facility]a via van at 2:30 pm. Daughter Blessing updated.

## 2024-09-10 NOTE — PLAN OF CARE
Received message from Siomara at [facility]. Insurance authorization still pending.

## 2024-09-10 NOTE — NURSING
Nurses Note -- 4 Eyes      9/10/2024   6:44 PM      Skin assessed during: Transfer      [] No Altered Skin Integrity Present    []Prevention Measures Documented      [x] Yes- Altered Skin Integrity Present or Discovered   [] LDA Added if Not in Epic (Describe Wound)   [x] New Altered Skin Integrity was Present on Admit and Documented in LDA   [x] Wound Image Taken    Wound Care Consulted? Yes    Attending Nurse:  Phani House LPN    Second RN/Staff Member:   Cyndi Quiles RN

## 2024-09-10 NOTE — PROGRESS NOTES
[facility] - 4th Floor [facility]  Progress Note    Patient Name: Baudilio Mantilla  MRN: [id number]  Patient Class: IP- Inpatient   Admission Date: 9/3/2024  Length of Stay: 7 days  Attending Physician: Saman Kearns II, MD  Primary Care Provider: Saman Kearns II, MD        Subjective:     Principal Problem:Acute on chronic systolic (congestive) heart failure        HPI:  89 year old white female with history of CHF presented to the ED 9/3 for generalized weakness and SOB. Patient sleeps in her recliner because of CHF and was not able to get out of the chair due to generalized weakness. Patient also reports bilateral shoulder and wrist pain over the past 1-2 weeks. She is on Bumex and spironolactone and has been compliant with her medications. She reports worsening leg swelling and SOB.  BNP was elevated, and chest x-ray showed cardiomegaly.  She was given a dose of IV Lasix last night.  She is currently on 1 L of oxygen with oxygen saturation of 98%.    She has a past medical history of chronic systolic heart failure, paroxysmal atrial fibrillation, gout, hypertension, hyperparathyroidism among other conditions.  She lives alone.  She denies alcohol, tobacco, or drug use.  See below for more details of social history, family history, past medical history etc    Overview/Hospital Course:  No notes on file    Interval History: Stable overnight, alert and oriented. Ongoing L wrist pain related to gout    Review of Systems   Constitutional:  Negative for fever.   Respiratory:  Negative for shortness of breath.    Cardiovascular:  Negative for chest pain.   Musculoskeletal:  Positive for arthralgias and joint swelling.     Objective:     Vital Signs (Most Recent):  Temp: 97.5 °F (36.4 °C) (09/10/24 0722)  Pulse: 68 (09/10/24 0722)  Resp: 18 (09/10/24 0722)  BP: 130/83 (09/10/24 0722)  SpO2: 95 % (09/10/24 0722) Vital Signs (24h Range):  Temp:  [96.1 °F (35.6 °C)-98.8 °F  (37.1 °C)] 97.5 °F (36.4 °C)  Pulse:  [62-85] 68  Resp:  [17-20] 18  SpO2:  [92 %-97 %] 95 %  BP: (105-130)/(67-83) 130/83     Weight: 78.1 kg (172 lb 2.9 oz)  Body mass index is 30.5 kg/m².    Intake/Output Summary (Last 24 hours) at 9/10/2024 0758  Last data filed at 9/9/2024 2300  Gross per 24 hour   Intake 520 ml   Output 1150 ml   Net -630 ml         Physical Exam  Constitutional:       General: She is not in acute distress.  Eyes:      Extraocular Movements: Extraocular movements intact.   Cardiovascular:      Rate and Rhythm: Normal rate.   Pulmonary:      Effort: No respiratory distress.      Breath sounds: No wheezing.   Musculoskeletal:      Right lower leg: No edema.      Left lower leg: No edema.      Comments: Left wrist tenderness to palpation, mild erythema   Neurological:      Mental Status: She is alert. Mental status is at baseline.             Significant Labs: All pertinent labs within the past 24 hours have been reviewed.    Significant Imaging: I have reviewed all pertinent imaging results/findings within the past 24 hours.    Assessment/Plan:      * Acute on chronic systolic (congestive) heart failure  - BNP elevated  - CXR showed cardiomegaly and questionable pneumonia.  CT chest 9/4 showed chronic changes, no clear-cut pneumonia.  No fever, no cough, no longer on oxygen.  - Echo 9/4 EF 40-45%  - She was given a dose of IV Lasix 9/3 and 9/4  - Continue spironolactone 25 mg.  At home, she is on bumetanide 2 mg in the morning and 1 mg at lunchtime.  At home lately, she was not been taking her noon dose of bumetanide. Hold bumetanide today  - Lower extremity swelling has improved since admit.  Consulted case management for swing bed placement, [facility] preferred near her house  - Stable for transfer when accepted    Paroxysmal atrial fibrillation  - Diagnosed in 2015, had digoxin toxicity in 2016 and was in the ICU with heart rate in the 20s and 30s. At that time, she was on digoxin,  amiodarone, and Cardizem. Pacemaker placed in 2020, no longer on metoprolol  - Rate controlled  - Eliquis 2.5 mg    Chronic kidney disease (CKD), stage IV (severe)  - Nephrology following, renal ultrasound shows chronic disease  - Creatinine improving; BUN above 80 (received steroids past few days)    Primary hypertension  - Stable    Gout of multiple sites  - Bilateral wrist and shoulder pain, no recent trauma.  Likely gout flare-up  - She had allergy to allopurinol.  Gave IV steroids the past few days, continue colchicine initiated 9/6, X-ray L wrist negative for fractures 9/6  - Try prednisone PO today    Hyperthyroidism  - On methimazole 2.5 mg daily  - TSH and free T4 normal      VTE Risk Mitigation (From admission, onward)           Ordered     apixaban tablet 2.5 mg  2 times daily         09/04/24 0744     IP VTE HIGH RISK PATIENT  Once         09/03/24 2159     Place sequential compression device  Until discontinued         09/03/24 2159                    Discharge Planning   SAMARIA:      Code Status: DNR   Is the patient medically ready for discharge?:     Reason for patient still in hospital (select all that apply): Treatment  Discharge Plan A: Skilled Nursing Facility                  Saman Kearns II, MD  Department of Hospital Medicine   [facility] - 4th Floor Medical Telemetry

## 2024-09-10 NOTE — NURSING
Nurses Note -- 4 Eyes      9/10/2024   3:26 PM      Skin assessed during: Q Shift Change      [] No Altered Skin Integrity Present    [x]Prevention Measures Documented      [x] Yes- Altered Skin Integrity Present or Discovered   [] LDA Added if Not in Epic (Describe Wound)   [] New Altered Skin Integrity was Present on Admit and Documented in LDA   [] Wound Image Taken    Wound Care Consulted? Yes    Attending Nurse:  Sally Brewer RN/Staff Member:  BREANNA Leblanc

## 2024-09-10 NOTE — NURSING
Report called to [facility] Swing Bed and given to Phani. Patient transported via [facility] in stable condition.

## 2024-09-10 NOTE — PT/OT/SLP PROGRESS
Occupational Therapy   Treatment    Name: Baudilio Mantilla  MRN: [id number]  Admitting Diagnosis:  Acute on chronic systolic heart failure       Recommendations:     Recommended therapy intensity at discharge: Moderate Intensity Therapy   Discharge Equipment Recommendations:  to be determined by next level of care  Barriers to discharge:   (ongoing medical needs; placement)    Assessment:     Baudilio Mantilla is a 89 y.o. female with a medical diagnosis of chronic CHF, paroxysmal atrial fibrillation, CKD, and gout.  She presents with increased arousal and participation. Patient states she does not remember any of yesterday's events. Performance deficits affecting function are weakness, impaired endurance, impaired self care skills, impaired functional mobility, gait instability, impaired balance, pain, decreased safety awareness, decreased lower extremity function, decreased upper extremity function.     Rehab Prognosis:  Fair; patient would benefit from acute skilled OT services to address these deficits and reach maximum level of function.       Plan:     Patient to be seen 5 x/week to address the above listed problems via self-care/home management, therapeutic activities, therapeutic exercises  Plan of Care Expires: 10/05/24  Plan of Care Reviewed with: patient    Subjective     Pain/Comfort:  Location - Side 1: Left  Location 1: wrist  Pain Addressed 1: Reposition, Distraction, Cessation of Activity, Nurse notified    Objective:     Communicated with: nurse prior to session.  Patient found HOB elevated with peripheral IV, telemetry, pulse ox (continuous), PureWick upon OT entry to room.    General Precautions: Standard, fall    Orthopedic Precautions:N/A  Braces: N/A  Respiratory Status: Room air     Occupational Performance:     Bed Mobility:    Patient completed Supine to Sit with total assistance     Functional Mobility/Transfers:  Patient completed Bed <> Chair Transfer using Squat Pivot technique with total  assistance with hand-held assist    Activities of Daily Living:  Grooming: minimum assistance oral care; able to bring to mouth  Toileting: total assistance purewick in place  LB dressing: total A to lon brief    Therapeutic Activities:  Patient able to sit EOB ~10 minutes progressing from Mod A to CGA while performing activities and exercises     Patient Education:  Patient provided with verbal education education regarding OT role/goals/POC, fall prevention, safety awareness, Discharge/DME recommendations, and pressure ulcer prevention.  Understanding was verbalized, however additional teaching warranted.      Patient left up in chair with all lines intact, call button in reach, kenan pad in place, and nurse notified by PTA    GOALS:   Multidisciplinary Problems       Occupational Therapy Goals          Problem: Occupational Therapy    Goal Priority Disciplines Outcome Interventions   Occupational Therapy Goal     OT, PT/OT Progressing    Description: LTG: Pt will perform basic ADLs and ADL transfers with Modified independence using LRAD by discharge.    STG: to be met by 10/5/24    Pt will complete grooming standing at sink with LRAD with SBA.  Pt will complete UB dressing with SBA.  Pt will complete LB dressing with SBA using LRAD and AE prn.  Pt will complete toileting with SBA using LRAD.  Pt will complete functional mobility to/from toilet and toilet transfer with SBA using LRAD.                        Time Tracking:     OT Date of Treatment: 09/10/24  OT Start Time: 1028  OT Stop Time: 1053  OT Total Time (min): 25 min    Billable Minutes:Self Care/Home Management 25    OT/KATHY: OT     Number of KATHY visits since last OT visit: 3    9/10/2024

## 2024-09-10 NOTE — PT/OT/SLP PROGRESS
Physical Therapy Treatment    Patient Name:  Baudilio Mantilla   MRN:  [id number]    Recommendations:     Discharge therapy intensity: Moderate Intensity Therapy   Discharge Equipment Recommendations: to be determined by next level of care  Barriers to discharge: Ongoing medical needs and placement.    Assessment:     Baudilio Mantilla is a 89 y.o. female admitted with a medical diagnosis of chronic CHF, paroxysmal atrial fibrillation, CKD, and gout.  She presents with the following impairments/functional limitations: weakness, impaired endurance, impaired balance, gait instability, impaired self care skills, impaired functional mobility, decreased safety awareness, pain, decreased upper extremity function.    Pt demo'd increased arousal and decreased anxiety during tx session.     Rehab Prognosis: Good; patient would benefit from acute skilled PT services to address these deficits and reach maximum level of function.    Recent Surgery: * No surgery found *      Plan:     During this hospitalization, patient would benefit from acute PT services 5 x/week to address the identified rehab impairments via gait training, therapeutic activities, therapeutic exercises and progress toward the following goals:    Plan of Care Expires:  10/04/24    Subjective     Chief Complaint: L wrist pain and low back pain  Patient/Family Comments/goals:   Pain/Comfort:         Objective:     Communicated with RN prior to session.  Patient found HOB elevated with PureWick, telemetry upon PT entry to room.     General Precautions: Standard, fall  Orthopedic Precautions: N/A  Braces: N/A  Respiratory Status: Room air  Skin Integrity: Visible skin intact      Functional Mobility:  Bed Mobility:     Supine to Sit: total assistance  Transfers:     Sit to Stand:  total assistance with no AD  Bed to Chair: total assistance with  no AD  using  Squat Pivot  Balance: Static seated balance modA-CGA    Therapeutic Activities/Exercises:  Pt sat EOB modA-CGA  ~10 min for oral care and therex  ModA at first then progressed to CGA   LAQ and seated marches x 10ea AAROM    Education:  Patient provided with verbal education education regarding PT role/goals/POC, fall prevention, and safety awareness.  Understanding was verbalized.     Patient left up in chair with all lines intact, call button in reach, kenan pad in place, and RN notified    GOALS:   Multidisciplinary Problems       Physical Therapy Goals          Problem: Physical Therapy    Goal Priority Disciplines Outcome Goal Variances Interventions   Physical Therapy Goal     PT, PT/OT Progressing     Description: Goals to be met by: 10/5/24     Patient will increase functional independence with mobility by performin. Supine to sit with Modified [place]  2. Sit to supine with Modified [place]  3. Sit to stand transfer with Stand-by Assistance  4. Bed to chair transfer with Stand-by Assistance using Rolling Walker  5. Gait  x 100 feet with Contact Guard Assistance using Rolling Walker.                          Time Tracking:     PT Received On: 09/10/24  PT Start Time: 1025     PT Stop Time: 1054  PT Total Time (min): 29 min     Billable Minutes: Therapeutic Activity 29    Treatment Type: Treatment  PT/PTA: PTA     Number of PTA visits since last PT visit: 3     09/10/2024

## 2024-09-10 NOTE — SUBJECTIVE & OBJECTIVE
Interval History: Stable overnight, alert and oriented. Ongoing L wrist pain related to gout    Review of Systems   Constitutional:  Negative for fever.   Respiratory:  Negative for shortness of breath.    Cardiovascular:  Negative for chest pain.   Musculoskeletal:  Positive for arthralgias and joint swelling.     Objective:     Vital Signs (Most Recent):  Temp: 97.5 °F (36.4 °C) (09/10/24 0722)  Pulse: 68 (09/10/24 0722)  Resp: 18 (09/10/24 0722)  BP: 130/83 (09/10/24 0722)  SpO2: 95 % (09/10/24 0722) Vital Signs (24h Range):  Temp:  [96.1 °F (35.6 °C)-98.8 °F (37.1 °C)] 97.5 °F (36.4 °C)  Pulse:  [62-85] 68  Resp:  [17-20] 18  SpO2:  [92 %-97 %] 95 %  BP: (105-130)/(67-83) 130/83     Weight: 78.1 kg (172 lb 2.9 oz)  Body mass index is 30.5 kg/m².    Intake/Output Summary (Last 24 hours) at 9/10/2024 0758  Last data filed at 9/9/2024 2300  Gross per 24 hour   Intake 520 ml   Output 1150 ml   Net -630 ml         Physical Exam  Constitutional:       General: She is not in acute distress.  Eyes:      Extraocular Movements: Extraocular movements intact.   Cardiovascular:      Rate and Rhythm: Normal rate.   Pulmonary:      Effort: No respiratory distress.      Breath sounds: No wheezing.   Musculoskeletal:      Right lower leg: No edema.      Left lower leg: No edema.      Comments: Left wrist tenderness to palpation, mild erythema   Neurological:      Mental Status: She is alert. Mental status is at baseline.             Significant Labs: All pertinent labs within the past 24 hours have been reviewed.    Significant Imaging: I have reviewed all pertinent imaging results/findings within the past 24 hours.

## 2024-09-10 NOTE — HOSPITAL COURSE
Discharge diagnoses:   1. Acute on chronic systolic heart failure  2. Paroxysmal atrial fibrillation   3. Chronic kidney disease stage 4   4. Hypertension   5. Gout  6. Hyperthyroidism    7. Advanced age    89-year-old female presented to the emergency room 9/3 with weakness and dyspnea.  BNP was elevated, and chest x-ray showed cardiomegaly.  She was placed on 2 L of oxygen and was given IV Lasix in the emergency room.  Echocardiogram 9/4 showed ejection fraction of 40-45%.  She is on bumetanide and spironolactone at home.  She normally takes bumetanide 2 mg in the morning and 1 mg at lunchtime, but she missed a few of the lunchtime doses of bumetanide lately (unclear how long).  Cardiology was consulted and recommended adding metoprolol succinate 12.5 mg daily.  Eliquis was also decreased to 2.5 mg twice a day - reduced dose due to age and weight. Nephrology was consulted, and renal ultrasound showed only chronic disease, no acute findings.    Dyspnea has improved, and she is no longer on oxygen.  She continues to have generalized weakness and is being transferred to [facility] for further rehabilitation.    She has bilateral wrist and shoulder pain, with inflammation of the left wrist related to gout.  She has an allergy to allopurinol.  X-ray of the left wrist was negative for fractures 9/6.  To treat gout, she was given a few doses of IV steroids, causing leukocytosis and BUN to elevate further.  Colchicine was initiated 9/6.  She will continue prednisone for 3 more days.

## 2024-09-10 NOTE — PHYSICIAN QUERY
Question: Please clarify the integumentary diagnosis related to the documentation of coccyx:    Provider Query Response:  Deep Tissue Pressure Injury

## 2024-09-10 NOTE — ASSESSMENT & PLAN NOTE
- BNP elevated  - CXR showed cardiomegaly and questionable pneumonia.  CT chest 9/4 showed chronic changes, no clear-cut pneumonia.  No fever, no cough, no longer on oxygen.  - Echo 9/4 EF 40-45%  - She was given a dose of IV Lasix 9/3 and 9/4  - Continue spironolactone 25 mg.  At home, she is on bumetanide 2 mg in the morning and 1 mg at lunchtime.  At home lately, she was not been taking her noon dose of bumetanide. Hold bumetanide today  - Lower extremity swelling has improved since admit.  Consulted case management for swing bed placement, [place] [facility] preferred near her house  - Stable for transfer when accepted

## 2024-09-10 NOTE — ASSESSMENT & PLAN NOTE
- Bilateral wrist and shoulder pain, no recent trauma.  Likely gout flare-up  - She had allergy to allopurinol.  Gave IV steroids the past few days, continue colchicine initiated 9/6, X-ray L wrist negative for fractures 9/6  - Try prednisone PO today

## 2024-09-11 LAB
ANION GAP SERPL CALC-SCNC: 10 MEQ/L
BUN SERPL-MCNC: 80.8 MG/DL (ref 9.8–20.1)
CALCIUM SERPL-MCNC: 10.2 MG/DL (ref 8.4–10.2)
CHLORIDE SERPL-SCNC: 102 MMOL/L (ref 98–111)
CO2 SERPL-SCNC: 27 MMOL/L (ref 23–31)
CREAT SERPL-MCNC: 1.47 MG/DL (ref 0.55–1.02)
CREAT/UREA NIT SERPL: 55
GFR SERPLBLD CREATININE-BSD FMLA CKD-EPI: 33 ML/MIN/1.73/M2
GLUCOSE SERPL-MCNC: 140 MG/DL (ref 75–121)
POTASSIUM SERPL-SCNC: 4.6 MMOL/L (ref 3.5–5.1)
SODIUM SERPL-SCNC: 139 MMOL/L (ref 136–145)

## 2024-09-11 PROCEDURE — 97530 THERAPEUTIC ACTIVITIES: CPT

## 2024-09-11 PROCEDURE — 87070 CULTURE OTHR SPECIMN AEROBIC: CPT | Performed by: STUDENT IN AN ORGANIZED HEALTH CARE EDUCATION/TRAINING PROGRAM

## 2024-09-11 PROCEDURE — 80048 BASIC METABOLIC PNL TOTAL CA: CPT | Performed by: PHYSICIAN ASSISTANT

## 2024-09-11 PROCEDURE — 97162 PT EVAL MOD COMPLEX 30 MIN: CPT

## 2024-09-11 PROCEDURE — 36415 COLL VENOUS BLD VENIPUNCTURE: CPT | Performed by: PHYSICIAN ASSISTANT

## 2024-09-11 PROCEDURE — 97166 OT EVAL MOD COMPLEX 45 MIN: CPT

## 2024-09-11 PROCEDURE — 25000003 PHARM REV CODE 250: Performed by: PHYSICIAN ASSISTANT

## 2024-09-11 PROCEDURE — 97116 GAIT TRAINING THERAPY: CPT

## 2024-09-11 PROCEDURE — 97535 SELF CARE MNGMENT TRAINING: CPT

## 2024-09-11 PROCEDURE — 99900035 HC TECH TIME PER 15 MIN (STAT)

## 2024-09-11 PROCEDURE — 63600175 PHARM REV CODE 636 W HCPCS: Performed by: PHYSICIAN ASSISTANT

## 2024-09-11 PROCEDURE — 87184 SC STD DISK METHOD PER PLATE: CPT | Performed by: STUDENT IN AN ORGANIZED HEALTH CARE EDUCATION/TRAINING PROGRAM

## 2024-09-11 PROCEDURE — 11000004 HC SNF PRIVATE

## 2024-09-11 PROCEDURE — 94760 N-INVAS EAR/PLS OXIMETRY 1: CPT

## 2024-09-11 RX ORDER — DICLOFENAC SODIUM 10 MG/G
2 GEL TOPICAL 3 TIMES DAILY PRN
Status: DISCONTINUED | OUTPATIENT
Start: 2024-09-11 | End: 2024-09-17 | Stop reason: HOSPADM

## 2024-09-11 RX ORDER — LIDOCAINE 50 MG/G
1 PATCH TOPICAL
Status: DISCONTINUED | OUTPATIENT
Start: 2024-09-11 | End: 2024-09-17 | Stop reason: HOSPADM

## 2024-09-11 RX ORDER — PREDNISONE 20 MG/1
20 TABLET ORAL DAILY
Status: COMPLETED | OUTPATIENT
Start: 2024-09-11 | End: 2024-09-13

## 2024-09-11 RX ADMIN — COLCHICINE 0.6 MG: 0.6 TABLET, FILM COATED ORAL at 09:09

## 2024-09-11 RX ADMIN — APIXABAN 2.5 MG: 2.5 TABLET, FILM COATED ORAL at 08:09

## 2024-09-11 RX ADMIN — SERTRALINE HYDROCHLORIDE 50 MG: 50 TABLET ORAL at 09:09

## 2024-09-11 RX ADMIN — APIXABAN 2.5 MG: 2.5 TABLET, FILM COATED ORAL at 09:09

## 2024-09-11 RX ADMIN — PREDNISONE 20 MG: 20 TABLET ORAL at 11:09

## 2024-09-11 RX ADMIN — BUMETANIDE 2 MG: 1 TABLET ORAL at 09:09

## 2024-09-11 RX ADMIN — MICONAZOLE NITRATE: 20 CREAM TOPICAL at 08:09

## 2024-09-11 RX ADMIN — MICONAZOLE NITRATE: 20 CREAM TOPICAL at 09:09

## 2024-09-11 RX ADMIN — METHIMAZOLE 2.5 MG: 5 TABLET ORAL at 09:09

## 2024-09-11 RX ADMIN — METOPROLOL SUCCINATE 12.5 MG: 25 TABLET, EXTENDED RELEASE ORAL at 09:09

## 2024-09-11 RX ADMIN — SPIRONOLACTONE 25 MG: 25 TABLET ORAL at 09:09

## 2024-09-11 RX ADMIN — DICLOFENAC SODIUM 2 G: 10 GEL TOPICAL at 11:09

## 2024-09-11 NOTE — PLAN OF CARE
Problem: Adult Inpatient Plan of Care  Goal: Plan of Care Review  Outcome: Progressing  Flowsheets (Taken 9/11/2024 1240)  Plan of Care Reviewed With:   patient   family  Goal: Patient-Specific Goal (Individualized)  Outcome: Progressing  Flowsheets (Taken 9/11/2024 1240)  Individualized Care Needs: wound care, encourage pt/ot, pain management, accurate I&Os  Anxieties, Fears or Concerns: pain management  Goal: Absence of Hospital-Acquired Illness or Injury  Outcome: Progressing  Intervention: Identify and Manage Fall Risk  Flowsheets (Taken 9/11/2024 1240)  Safety Promotion/Fall Prevention:   assistive device/personal item within reach   bed alarm set   chair alarm set   family expresses understanding of fall risk and prevention   Fall Risk signage in place   Fall Risk reviewed with patient/family   gait belt with ambulation   instructed to call staff for mobility   nonskid shoes/socks when out of bed   side rails raised x 2  Goal: Optimal Comfort and Wellbeing  Outcome: Progressing  Goal: Readiness for Transition of Care  Outcome: Progressing     Problem: Infection  Goal: Absence of Infection Signs and Symptoms  Outcome: Progressing  Intervention: Prevent or Manage Infection  Flowsheets (Taken 9/11/2024 1240)  Infection Management: aseptic technique maintained  Isolation Precautions: protective     Problem: Wound  Goal: Optimal Coping  Outcome: Progressing  Goal: Optimal Functional Ability  Outcome: Progressing  Intervention: Optimize Functional Ability  Flowsheets (Taken 9/11/2024 1240)  Assistive Device Utilized:   gait belt   walker  Activity Management: Rolling - L1  Activity Assistance Provided: assistance, 2 people  Goal: Absence of Infection Signs and Symptoms  Outcome: Progressing  Intervention: Prevent or Manage Infection  Flowsheets (Taken 9/11/2024 1240)  Infection Management: aseptic technique maintained  Isolation Precautions: protective  Goal: Improved Oral Intake  Outcome:  Progressing  Intervention: Promote and Optimize Oral Intake  Flowsheets (Taken 9/11/2024 1240)  Oral Nutrition Promotion: adaptive equipment use encouraged  Goal: Optimal Pain Control and Function  Outcome: Progressing  Intervention: Prevent or Manage Pain  Flowsheets (Taken 9/11/2024 1240)  Sleep/Rest Enhancement: awakenings minimized  Pain Management Interventions: pain management plan reviewed with patient/caregiver  Goal: Skin Health and Integrity  Outcome: Progressing  Intervention: Optimize Skin Protection  Flowsheets (Taken 9/11/2024 1240)  Pressure Reduction Techniques: frequent weight shift encouraged  Skin Protection: incontinence pads utilized  Activity Management: Rolling - L1  Head of Bed (HOB) Positioning: HOB at 20-30 degrees  Goal: Optimal Wound Healing  Outcome: Progressing  Intervention: Promote Wound Healing  Flowsheets (Taken 9/11/2024 1240)  Sleep/Rest Enhancement: awakenings minimized     Problem: Skin Injury Risk Increased  Goal: Skin Health and Integrity  Outcome: Progressing  Intervention: Optimize Skin Protection  Flowsheets (Taken 9/11/2024 1240)  Pressure Reduction Techniques: frequent weight shift encouraged  Skin Protection: incontinence pads utilized  Activity Management: Rolling - L1  Head of Bed (HOB) Positioning: HOB at 20-30 degrees     Problem: Heart Failure  Goal: Optimal Cardiac Output  Outcome: Progressing  Intervention: Optimize Cardiac Output  Flowsheets (Taken 9/11/2024 1240)  Stabilization Measures: airway opened  Environmental Support:   calm environment promoted   environmental consistency promoted  Goal: Stable Heart Rate and Rhythm  Outcome: Progressing  Goal: Fluid and Electrolyte Balance  Outcome: Progressing  Intervention: Monitor and Manage Fluid and Electrolyte Balance  Flowsheets (Taken 9/11/2024 1240)  Fluid/Electrolyte Management: fluids provided     Problem: Fall Injury Risk  Goal: Absence of Fall and Fall-Related Injury  Outcome: Progressing  Intervention:  Identify and Manage Contributors  Flowsheets (Taken 9/11/2024 1240)  Self-Care Promotion:   adaptive equipment use encouraged   independence encouraged   BADL personal objects within reach  Medication Review/Management: medications reviewed

## 2024-09-11 NOTE — DISCHARGE SUMMARY
[facility] - 4th [facility] Medicine  Discharge Summary      Patient Name: Baudilio Mantilla  MRN: [id number]  [facility]: [id number]  Patient Class: IP- Inpatient  Admission Date: 9/3/2024  Hospital Length of Stay: 7 days  Discharge Date and Time: 9/10/2024  3:25 PM  Attending Physician: Shannon att. providers found   Discharging Provider: Saman Kearns II, MD  Primary Care Provider: Saman Kearns II, MD    Primary Care Team: Networked reference to record PCT     HPI:   89 year old white female with history of CHF presented to the ED 9/3 for generalized weakness and SOB. Patient sleeps in her recliner because of CHF and was not able to get out of the chair due to generalized weakness. Patient also reports bilateral shoulder and wrist pain over the past 1-2 weeks. She is on Bumex and spironolactone and has been compliant with her medications. She reports worsening leg swelling and SOB.  BNP was elevated, and chest x-ray showed cardiomegaly.  She was given a dose of IV Lasix last night.  She is currently on 1 L of oxygen with oxygen saturation of 98%.    She has a past medical history of chronic systolic heart failure, paroxysmal atrial fibrillation, gout, hypertension, hyperparathyroidism among other conditions.  She lives alone.  She denies alcohol, tobacco, or drug use.  See below for more details of social history, family history, past medical history etc    * No surgery found *      Hospital Course:   Discharge diagnoses:   1. Acute on chronic systolic heart failure  2. Paroxysmal atrial fibrillation   3. Chronic kidney disease stage 4   4. Hypertension   5. Gout  6. Hyperthyroidism    7. Advanced age    89-year-old female presented to the emergency room 9/3 with weakness and dyspnea.  BNP was elevated, and chest x-ray showed cardiomegaly.  She was placed on 2 L of oxygen and was given IV Lasix in the emergency room.  Echocardiogram 9/4 showed ejection fraction of 40-45%.  She is on  bumetanide and spironolactone at home.  She normally takes bumetanide 2 mg in the morning and 1 mg at lunchtime, but she missed a few of the lunchtime doses of bumetanide lately (unclear how long).  Cardiology was consulted and recommended adding metoprolol succinate 12.5 mg daily.  Eliquis was also decreased to 2.5 mg twice a day - reduced dose due to age and weight. Nephrology was consulted, and renal ultrasound showed only chronic disease, no acute findings.    Dyspnea has improved, and she is no longer on oxygen.  She continues to have generalized weakness and is being transferred to [facility] for further rehabilitation.    She has bilateral wrist and shoulder pain, with inflammation of the left wrist related to gout.  She has an allergy to allopurinol.  X-ray of the left wrist was negative for fractures 9/6.  To treat gout, she was given a few doses of IV steroids, causing leukocytosis and BUN to elevate further.  Colchicine was initiated 9/6.  She will continue prednisone for 3 more days.     Goals of Care Treatment Preferences:  Code Status: DNR         Consults:   Consults (From admission, onward)          Status Ordering Provider     Inpatient consult to Spiritual Care  Once        Provider:  (Not yet assigned)    Completed ADRIANE SOLO II     Inpatient consult to Social Work/Case Management  Once        Provider:  (Not yet assigned)    Completed ADRIANE SOLO II     Inpatient consult to Registered Dietitian/Nutritionist  Once        Provider:  (Not yet assigned)    Completed ADRIANE SOLO II     Inpatient consult to Cardiology  Once        Provider:  Christopher Godinez Jr., MD    Completed CURET IV, ANGEL B            No new Assessment & Plan notes have been filed under this hospital service since the last note was generated.  Service: Hospital Medicine    Final Active Diagnoses:    Diagnosis Date Noted POA    PRINCIPAL PROBLEM:  Acute on chronic systolic heart failure  [I50.23] 09/04/2024 Yes    Paroxysmal atrial fibrillation [I48.0] 12/13/2022 Yes    Chronic kidney disease (CKD), stage IV (severe) [N18.4] 11/27/2023 Yes    Primary hypertension [I10] 12/13/2022 Yes    Gout of multiple sites [M10.9] 09/04/2024 Yes    Hyperthyroidism [E05.90] 12/13/2022 Yes      Problems Resolved During this Admission:       Discharged Condition: fair    Disposition: Skilled Nursing Facility    Follow Up:    Patient Instructions:   No discharge procedures on file.    Significant Diagnostic Studies: Labs: BMP:   Recent Labs   Lab 09/09/24  1248 09/10/24  0503    137   K 4.5 4.7    101   CO2 29 24   BUN 77.3* 90.0*   CREATININE 1.53* 1.75*   CALCIUM 9.8 9.6       Pending Diagnostic Studies:       None           Medications:  Reconciled Home Medications:      Medication List        START taking these medications      metoprolol succinate 25 MG 24 hr tablet  Commonly known as: TOPROL-XL  Take 0.5 tablets (12.5 mg total) by mouth once daily.     polyethylene glycol 17 gram Pwpk  Commonly known as: GLYCOLAX  Take 17 g by mouth daily as needed for Constipation.     predniSONE 20 MG tablet  Commonly known as: DELTASONE  Take 1 tablet (20 mg total) by mouth once daily. for 3 days            CHANGE how you take these medications      apixaban 2.5 mg Tab  Commonly known as: ELIQUIS  Take 1 tablet (2.5 mg total) by mouth 2 (two) times daily.  What changed:   medication strength  how much to take     bumetanide 2 MG tablet  Commonly known as: BUMEX  Take 1 tablet (2 mg total) by mouth once daily.  What changed:   medication strength  how much to take  how to take this  when to take this  additional instructions     colchicine 0.6 mg tablet  Commonly known as: COLCRYS  Take 1 tablet (0.6 mg total) by mouth once daily.  What changed:   how much to take  how to take this  when to take this  additional instructions            CONTINUE taking these medications      methIMAzole 5 MG Tab  Commonly known as:  TAPAZOLE  0.5 tablet po daily.     pantoprazole 40 MG tablet  Commonly known as: PROTONIX  Take 1 tablet (40 mg total) by mouth 2 (two) times daily.     sertraline 50 MG tablet  Commonly known as: ZOLOFT  Take 1 tablet (50 mg total) by mouth once daily.     spironolactone 25 MG tablet  Commonly known as: ALDACTONE  Take 1 tablet (25 mg total) by mouth once daily.     traMADoL 50 mg tablet  Commonly known as: ULTRAM  Take 1 tablet (50 mg total) by mouth 2 (two) times daily as needed for Pain.            STOP taking these medications      diclofenac sodium 1 % Gel  Commonly known as: VOLTAREN     FEOSOL ORAL     meclizine 12.5 mg tablet  Commonly known as: ANTIVERT     nystatin cream  Commonly known as: MYCOSTATIN     triamcinolone acetonide 0.1% 0.1 % ointment  Commonly known as: KENALOG              Indwelling Lines/Drains at time of discharge:   Lines/Drains/Airways       None                   Time spent on the discharge of patient: 50 minutes         Saman Kearns II, MD  Department of Hospital Medicine  [facility] - 4th Floor Medical Telemetry

## 2024-09-11 NOTE — PLAN OF CARE
Problem: Occupational Therapy  Goal: Occupational Therapy Goal  Description: Goals to be met by: 10/11/2024     Patient will increase functional independence with ADLs by performing:    UE Dressing with Set-up Assistance.  LE Dressing with Contact Guard Assistance c use of AE PRN.  Grooming while seated at sink with Set-up Assistance.  Toileting from toilet with Minimal Assistance for hygiene and clothing management.   Toilet transfer to toilet with Contact Guard Assistance.    Outcome: Progressing

## 2024-09-11 NOTE — PLAN OF CARE
Problem: Adult Inpatient Plan of Care  Goal: Plan of Care Review  Outcome: Progressing  Flowsheets (Taken 9/10/2024 1931)  Plan of Care Reviewed With: patient  Goal: Patient-Specific Goal (Individualized)  Outcome: Progressing  Flowsheets (Taken 9/10/2024 1931)  Individualized Care Needs: Monitor HR and BP, Monitor edema, Wound care consult, Pain management  Anxieties, Fears or Concerns: None currently verbalized  Goal: Absence of Hospital-Acquired Illness or Injury  Outcome: Progressing  Intervention: Identify and Manage Fall Risk  Flowsheets (Taken 9/10/2024 1931)  Safety Promotion/Fall Prevention:   assistive device/personal item within reach   bed alarm set   instructed to call staff for mobility   lighting adjusted   medications reviewed   muscle strengthening facilitated   gait belt with ambulation   nonskid shoes/socks when out of bed   room near unit station  Intervention: Prevent Skin Injury  Flowsheets (Taken 9/10/2024 1931)  Body Position: sitting up in bed  Skin Protection:   incontinence pads utilized   skin sealant/moisture barrier applied   transparent dressing maintained  Device Skin Pressure Protection:   absorbent pad utilized/changed   positioning supports utilized   tubing/devices free from skin contact  Intervention: Prevent and Manage VTE (Venous Thromboembolism) Risk  Flowsheets (Taken 9/10/2024 1931)  VTE Prevention/Management:   bleeding risk assessed   bleeding precautions maintained   ROM (active) performed   fluids promoted  Intervention: Prevent Infection  Flowsheets (Taken 9/10/2024 1931)  Infection Prevention:   cohorting utilized   hand hygiene promoted   single patient room provided  Goal: Optimal Comfort and Wellbeing  Outcome: Progressing  Intervention: Monitor Pain and Promote Comfort  Flowsheets (Taken 9/10/2024 1931)  Pain Management Interventions:   diversional activity provided   pillow support provided   position adjusted   quiet environment facilitated  Intervention:  Provide Person-Centered Care  Flowsheets (Taken 9/10/2024 1931)  Trust Relationship/Rapport:   care explained   choices provided   thoughts/feelings acknowledged   questions answered     Problem: Infection  Goal: Absence of Infection Signs and Symptoms  Outcome: Progressing  Intervention: Prevent or Manage Infection  Flowsheets (Taken 9/10/2024 1931)  Fever Reduction/Comfort Measures:   lightweight bedding   lightweight clothing  Infection Management: aseptic technique maintained  Isolation Precautions:   protective   precautions maintained     Problem: Wound  Goal: Optimal Coping  Outcome: Progressing  Intervention: Support Patient and Family Response  Flowsheets (Taken 9/10/2024 1931)  Supportive Measures:   active listening utilized   goal-setting facilitated   self-care encouraged  Family/Support System Care: self-care encouraged  Goal: Optimal Functional Ability  Outcome: Progressing  Intervention: Optimize Functional Ability  Flowsheets (Taken 9/10/2024 1931)  Activity Management: Rolling - L1  Activity Assistance Provided: assistance, 2 people  Goal: Absence of Infection Signs and Symptoms  Outcome: Progressing  Intervention: Prevent or Manage Infection  Flowsheets (Taken 9/10/2024 1931)  Fever Reduction/Comfort Measures:   lightweight bedding   lightweight clothing  Infection Management: aseptic technique maintained  Isolation Precautions:   protective   precautions maintained  Goal: Improved Oral Intake  Outcome: Progressing  Intervention: Promote and Optimize Oral Intake  Flowsheets (Taken 9/10/2024 1931)  Oral Nutrition Promotion:   calorie-dense foods provided   calorie-dense liquids provided   social interaction promoted   rest periods promoted  Nutrition Interventions: referred to dietitian  Goal: Optimal Pain Control and Function  Outcome: Progressing  Intervention: Prevent or Manage Pain  Flowsheets (Taken 9/10/2024 1931)  Sleep/Rest Enhancement:   consistent schedule promoted   regular sleep/rest  pattern promoted   relaxation techniques promoted  Pain Management Interventions:   diversional activity provided   pillow support provided   position adjusted   quiet environment facilitated  Goal: Skin Health and Integrity  Outcome: Progressing  Intervention: Optimize Skin Protection  Flowsheets (Taken 9/10/2024 1931)  Pressure Reduction Techniques:   frequent weight shift encouraged   rest period provided between sit times   weight shift assistance provided  Pressure Reduction Devices: positioning supports utilized  Skin Protection:   incontinence pads utilized   skin sealant/moisture barrier applied   transparent dressing maintained  Activity Management: Rolling - L1  Head of Bed (HOB) Positioning: HOB at 30 degrees  Goal: Optimal Wound Healing  Outcome: Progressing  Intervention: Promote Wound Healing  Flowsheets (Taken 9/10/2024 1931)  Sleep/Rest Enhancement:   consistent schedule promoted   regular sleep/rest pattern promoted   relaxation techniques promoted     Problem: Skin Injury Risk Increased  Goal: Skin Health and Integrity  Outcome: Progressing  Intervention: Optimize Skin Protection  Flowsheets (Taken 9/10/2024 1931)  Pressure Reduction Techniques:   frequent weight shift encouraged   rest period provided between sit times   weight shift assistance provided  Pressure Reduction Devices: positioning supports utilized  Skin Protection:   incontinence pads utilized   skin sealant/moisture barrier applied   transparent dressing maintained  Activity Management: Rolling - L1  Head of Bed (HOB) Positioning: HOB at 30 degrees  Intervention: Promote and Optimize Oral Intake  Flowsheets (Taken 9/10/2024 1931)  Oral Nutrition Promotion:   calorie-dense foods provided   calorie-dense liquids provided   social interaction promoted   rest periods promoted  Nutrition Interventions: referred to dietitian     Problem: Heart Failure  Goal: Optimal Cardiac Output  Outcome: Progressing  Intervention: Optimize Cardiac  Output  Flowsheets (Taken 9/10/2024 1931)  Stabilization Measures: legs elevated  Environmental Support:   calm environment promoted   distractions minimized  Goal: Stable Heart Rate and Rhythm  Outcome: Progressing  Goal: Fluid and Electrolyte Balance  Outcome: Progressing  Intervention: Monitor and Manage Fluid and Electrolyte Balance  Flowsheets (Taken 9/10/2024 1931)  Fluid/Electrolyte Management: fluids provided

## 2024-09-11 NOTE — H&P
[facility] MEDICINE - H&P ADMISSION NOTE    Patient Name: Baudilio Mantilla  MRN: [id number]  Patient Class: IP- Swing   Admission Date: 9/10/2024   Admitting Physician:  Service   Attending Physician: RACHEL Awan  Primary Care Provider: Saman Kearns II, MD  Face-to-Face encounter date: 09/11/2024      CHIEF COMPLAINT     No chief complaint on file.    HISTORY OF PRESENTING ILLNESS     Patient is a 89-year-old female with a past medical history of CHF, AFib, HTN, HLD a CKD, hypothyroidism, gout, arthritis, vitamin-D deficiency who presented to [facility] on 09/04/2024 acute on chronic HFrEF.  BNP was elevated on arrival CXR showed cardiomegaly.  She initially required supplemental oxygen.  Echo obtained showed EF 40-45% and diastolic dysfunction.  Patient was evaluated by Cardiology who initially had patient on IV Lasix and switch to home Bumex and spironolactone.  Eliquis was decreased to 2.5 mg b.i.d. due to age and renal function.  Patient was also initiated on metoprolol succinate 2.5 mg daily.  Patient continued to complain arthritic pain.  Patient was initiated on colchicine on 09/06/2024.  She was discharged on prednisone 20 mg daily for 3 days.  Patient was transferred to our Swing Facility for continued therapy.  Labs yesterday revealed BUN 90, creatinine around baseline.  Repeat labs today showed BUN improved to 80 with creatinine also improved.  Patient continues to complain of arthritic pain, adding lidocaine patches as needed.  Patient will be receiving therapy evaluations today.    PAST MEDICAL HISTORY     No past medical history on file.    PAST SURGICAL HISTORY     Past Surgical History:   Procedure Laterality Date    CATARACT EXTRACTION, BILATERAL      FOOT SURGERY      MASTECTOMY Right     TOTAL KNEE ARTHROPLASTY Bilateral      FAMILY HISTORY     Reviewed and noncontributory to this case    SOCIAL HISTORY     Social History     Socioeconomic History     Marital status:    Tobacco Use    Smoking status: Never    Smokeless tobacco: Never   Substance and Sexual Activity    Alcohol use: Never    Drug use: Never    Sexual activity: Never     Social Determinants of Health     Financial Resource Strain: Low Risk  (9/11/2024)    Overall Financial Resource Strain (CARDIA)     Difficulty of Paying Living Expenses: Not hard at all   Food Insecurity: No Food Insecurity (9/11/2024)    Hunger Vital Sign     Worried About Running Out of Food in the Last Year: Never true     Ran Out of Food in the Last Year: Never true   Transportation Needs: No Transportation Needs (9/11/2024)    TRANSPORTATION NEEDS     Transportation : No   Physical Activity: Inactive (9/11/2024)    Exercise Vital Sign     Days of Exercise per Week: 0 days     Minutes of Exercise per Session: 0 min   Stress: No Stress Concern Present (9/11/2024)    [employer] [place] [employer] - Occupational Stress Questionnaire     Feeling of Stress : Only a little   Housing Stability: Low Risk  (9/11/2024)    Housing Stability Vital Sign     Unable to Pay for Housing in the Last Year: No     Homeless in the Last Year: No     Patient's screen for food insecurity, housing instability, transportation needs, utility difficulties, and interpersonal safety. Social work consulted for discharge planning, communicating with the patient's insurance during admission.      HOME MEDICATIONS     Prior to Admission medications    Medication Sig Start Date End Date Taking? Authorizing Provider   apixaban (ELIQUIS) 2.5 mg Tab Take 1 tablet (2.5 mg total) by mouth 2 (two) times daily. 9/10/24   Saman Kearns II, MD   bumetanide (BUMEX) 2 MG tablet Take 1 tablet (2 mg total) by mouth once daily. 9/11/24 9/11/25  Saman Kearns II, MD   colchicine (COLCRYS) 0.6 mg tablet Take 1 tablet (0.6 mg total) by mouth once daily. 9/11/24 9/11/25  Saman Kearns II, MD   methIMAzole (TAPAZOLE) 5 MG Tab 0.5 tablet po daily.  5/20/24   Saman Kearns II, MD   metoprolol succinate (TOPROL-XL) 25 MG 24 hr tablet Take 0.5 tablets (12.5 mg total) by mouth once daily. 9/11/24 9/11/25  Saman Kearns II, MD   pantoprazole (PROTONIX) 40 MG tablet Take 1 tablet (40 mg total) by mouth 2 (two) times daily. 5/20/24   Saman Kearns II, MD   polyethylene glycol (GLYCOLAX) 17 gram PwPk Take 17 g by mouth daily as needed for Constipation. 9/10/24   Saman Kearns II, MD   predniSONE (DELTASONE) 20 MG tablet Take 1 tablet (20 mg total) by mouth once daily. for 3 days 9/10/24 9/13/24  Saman Kearns II, MD   sertraline (ZOLOFT) 50 MG tablet Take 1 tablet (50 mg total) by mouth once daily. 5/20/24   Saman Kearns II, MD   spironolactone (ALDACTONE) 25 MG tablet Take 1 tablet (25 mg total) by mouth once daily. 5/20/24   Saman Kearns II, MD   traMADoL (ULTRAM) 50 mg tablet Take 1 tablet (50 mg total) by mouth 2 (two) times daily as needed for Pain. 3/8/24   Saman Kearns II, MD     ALLERGIES     Allopurinol analogues and Penicillin    REVIEW OF SYSTEMS     Except as documented above, all other systems reviewed and negative    PHYSICAL EXAM     Vitals:    09/11/24 0903   BP: 122/76   Pulse: 68   Resp:    Temp:         General:  In no acute distress, resting comfortably  Head and neck:  Atraumatic, normocephalic, moist mucous membranes, supple neck  Chest:  Clear to auscultation bilaterally  Heart:  S1, S2, no appreciable murmur  Abdomen:  Soft, nontender, BS +  MSK:  Warm, no lower extremity edema, no clubbing or cyanosis  Neuro:  Alert and oriented x4, moving all extremities with good strength  Integumentary:  See media for sacral wound  Psychiatry:  Appropriate mood and affect    ASSESSMENT AND PLAN     Acute on chronic HFrEF  Echo EF 40-45% and diastolic dysfunction  Continue home Bumex 2 mg daily, spironolactone 25 mg daily  Daily weights, strict I's and O's    Afib  HTN  Continue metoprolol succinate 12.5 mg daily  Continue  Eliquis 2.5 mg b.i.d., decreased at previous facility due to age and renal function    CKD  Monitor renal function closely with Bumex 2 mg daily and spironolactone 25 mg daily  Renal function improved today from day prior  Monitor urine output    Gout, arthritis   Started on colchicine 0.6 mg daily on 09/06/2024  Continue prednisone 20 mg daily x3 days starting 09/11/2024  Left wrist x-ray 09/06/2024 showed inflammatory arthritis with 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  __________________________________________________________________  LABS/MICRO/MEDS/DIAGNOSTICS     LABS  Recent Labs     09/10/24  0503      K 4.7   CO2 24   BUN 90.0*   CREATININE 1.75*   GLUCOSE 145*   CALCIUM 9.6   ALKPHOS 92   AST 30   ALT 38   ALBUMIN 2.7*     Recent Labs     09/10/24  0503   WBC 14.76*   RBC 4.66   HCT 43.7   MCV 93.8        MICROBIOLOGY  Microbiology Results (last 7 days)       ** No results found for the last 168 hours. **          MEDICATIONS   apixaban  2.5 mg Oral BID    bumetanide  2 mg Oral Daily    colchicine  0.6 mg Oral Daily    methIMAzole  2.5 mg Oral Daily    metoprolol succinate  12.5 mg Oral Daily    miconazole   Topical (Top) BID    sertraline  50 mg Oral Daily    spironolactone  25 mg Oral Daily      INFUSIONS    DIAGNOSTIC TESTS  No orders to display      Patient information was obtained from patient, patient's family, past medical records and ER records.   All diagnosis and differential diagnosis have been reviewed; assessment and plan has been documented. I have personally reviewed the labs and test results that are presently available; I have reviewed the patients medication list. I have reviewed the consulting providers response and recommendations. I have reviewed or attempted to review medical records based upon  their availability.  All of the patient's questions have been addressed and answered. Patient's is agreeable to the above stated plan. I will continue to monitor closely and make adjustments to medical management as needed.  This note was created using [id number] voice recognition software that occasionally misinterpreted phrases or words.  Please contact me if any questions may rise regarding documentation to clarify verbiage.      RACHEL Awan   Internal Medicine  Department of Hospital Medicine [facility]

## 2024-09-11 NOTE — PT/OT/SLP EVAL
Physical Therapy Evaluation    Patient Name:  Baudilio Mantilla   MRN:  [id number]    Recommendations:     Discharge Recommendations: Moderate Intensity Therapy   Discharge Equipment Recommendations: other (see comments) (to be determined based on patients progress)   Barriers to discharge:  patient was living alone prior to hospitalization. Will need to determine availability of caregivers.    Assessment:     Baudilio Mantilla is a 89 y.o. female admitted with a medical diagnosis of Acute on chronic systolic heart failure.  She presents with the following impairments/functional limitations: weakness, impaired endurance, impaired functional mobility, gait instability, impaired balance, impaired cognition, pain, decreased ROM. Patient was agreeable to work with therapy this morning. Reported pain in Left UE/wrist. States that she had not yet requested pain meds.     Rehab Prognosis: Fair; patient would benefit from acute skilled PT services to address these deficits and reach maximum level of function.    Recent Surgery: * No surgery found *      Plan:     During this hospitalization, patient to be seen 6 x/week to address the identified rehab impairments via gait training, therapeutic activities, therapeutic exercises and progress toward the following goals:    Plan of Care Expires:  09/27/24    Subjective     Chief Complaint: Left UE/Wrist pain   Patient/Family Comments/goals: return to home and prior living arrangements   Pain/Comfort:  Pain Rating 1: 7/10  Location - Side 1: Left  Location - Orientation 1: distal  Location 1: arm  Pain Addressed 1: Cessation of Activity  Pain Rating Post-Intervention 1: 5/10  Location - Side 2: Left    Patients cultural, spiritual, Taoist conflicts given the current situation: no    Living Environment:  Patient lives in single story home alone. Son lives two blocks away and checks on patient.   Prior to admission, patients level of function was independent with bathing and dressing.  Family assisted with meals.  Equipment used at home: bath bench, walker, rolling.  DME owned (not currently used):  to be determined .  Upon discharge, patient will have assistance from son.    Objective:     Communicated with Nurse Prasad  prior to session.  Patient found supine with PureWick, peripheral IV, blood pressure cuff, bed alarm  upon PT entry to room.    General Precautions: Standard, fall  Orthopedic Precautions:    Braces: N/A  Respiratory Status: Room air    Exams:  Cognitive Exam:  Patient is oriented to Person and Situation  RLE ROM: WFL  RLE Strength: WFL  LLE ROM: WFL  LLE Strength: WFL    Functional Mobility:  Bed Mobility:     Rolling Left:  moderate assistance  Rolling Right: moderate assistance  Supine to Sit: moderate assistance and of 2 persons  Sit to Supine: moderate assistance and of 2 persons  Transfers:     Sit to Stand:  minimum assistance with rolling walker  Bed to Chair: moderate assistance with  rolling walker  using  Step Transfer  Gait: patient was unable to ambulate at this time  Balance: sitting = Good with SBA   Standing = Fair with RW and CGA       AM-PAC 6 CLICK MOBILITY  Total Score:11       Treatment & Education:  Patient transferred to bedside recliner utilizing RW and moderate assist requiring moderate verbal cueing for safety and step placement. Patient remained in recliner with kenan pad, chair alarm and call button in reach. Instructed patient to call nursing when ready to return to bed. Patient demonstrated good understanding of instructions by using teach back method.     Patient left up in chair with all lines intact, call button in reach, chair alarm on, and Nurse  notified.    GOALS:   Multidisciplinary Problems       Physical Therapy Goals          Problem: Physical Therapy    Goal Priority Disciplines Outcome Goal Variances Interventions   Physical Therapy Goal     PT, PT/OT Progressing     Description: Goals to be met by: 9/27/24     Patient will increase  functional independence with mobility by performin. Supine to sit with Contact Guard Assistance  2. Sit to supine with Contact Guard Assistance  3. Rolling to Left and Right with Stand-by Assistance.  4. Sit to stand transfer with Contact Guard Assistance  5. Bed to chair transfer with Contact Guard Assistance using Rolling Walker  6. Gait  x 25 feet with Contact Guard Assistance using Rolling Walker.                          History:     No past medical history on file.    Past Surgical History:   Procedure Laterality Date    CATARACT EXTRACTION, BILATERAL      FOOT SURGERY      MASTECTOMY Right     TOTAL KNEE ARTHROPLASTY Bilateral        Time Tracking:     PT Received On: 24  PT Start Time: 830     PT Stop Time: 852  PT Total Time (min): 22 min     Billable Minutes: Evaluation moderate intensity       2024

## 2024-09-11 NOTE — PLAN OF CARE
Problem: Physical Therapy  Goal: Physical Therapy Goal  Description: Goals to be met by: 24     Patient will increase functional independence with mobility by performin. Supine to sit with Contact Guard Assistance  2. Sit to supine with Contact Guard Assistance  3. Rolling to Left and Right with Stand-by Assistance.  4. Sit to stand transfer with Contact Guard Assistance  5. Bed to chair transfer with Contact Guard Assistance using Rolling Walker  6. Gait  x 25 feet with Contact Guard Assistance using Rolling Walker.     Outcome: Progressing

## 2024-09-11 NOTE — PT/OT/SLP EVAL
"Occupational Therapy Evaluation    Name: Baudilio Mantilla  MRN: [id number]  Admitting Diagnosis: Acute on chronic systolic heart failure  Recent Surgery: * No surgery found *      Recommendations:     Discharge Recommendations: Moderate Intensity Therapy  Level of Assistance Recommended: 24 hours light assistance  Discharge Equipment Recommendations: none  Barriers to discharge: Other (Comment) (ongoing medical needs; current level of assist required)    Assessment:     Baudilio Mantilla is a 89 y.o. female with a medical diagnosis of Acute on chronic systolic heart failure. Pt has a PMHx significant for: CHF, AFib, HTN, HLD a CKD, hypothyroidism, gout, arthritis, vitamin-D deficiency who presented to [facility] on 09/04/2024 acute on chronic HFrEF. 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 Pt was performing all bADL tasks Mod I c use of RW. Pt was performing simple meal prep. Pt reports goal of returning home to [facility]. Pt tolerated OT eval fairly well. Pt initially hesitant to remain [facility]; however agreeable c OT/PT encouragement. She presents with performance deficits affecting function including weakness, impaired endurance, impaired self care skills, impaired functional mobility, gait instability, impaired balance, decreased coordination, decreased upper extremity function, decreased safety awareness, pain, impaired coordination, decreased ROM, impaired fine motor, impaired muscle length, impaired joint extensibility.     Rehab Prognosis: Good and Fair; patient would benefit from acute OT services to address these deficits and reach maximum level of function.    Plan:     Patient to be seen  (5-6x/week (QD-BID)) to address the above listed problems via self-care/home management, therapeutic activities, therapeutic exercises  Plan of Care Expires: 10/11/24  Plan " of Care Reviewed with: patient    Subjective     Chief Complaint: L wrist pain  Patient Comments/Goals: return home to [facility]  Pain/Comfort:  Pain Rating 1: 7/10  Location - Side 1: Left  Location 1: wrist  Pain Addressed 1: Distraction, Cessation of Activity    Patients cultural, spiritual, Shinto conflicts given the current situation: no    Social History:  Living Environment: Patient lives alone in a single story home with tub-shower combo  Prior Level of Function: Prior to admission, patient was modified independent with ADLs using rolling walker for mobility  Roles and Routines: Patient was not driving and retired prior to admission.  Equipment Used at Home: walker, rolling, rollator, wheelchair, bath bench  DME owned (not currently used): none  Assistance Upon Discharge: unknown    Objective:     Communicated with NSG and PA prior to session. Patient found HOB elevated with PureWick, peripheral IV, telemetry, bed alarm upon OT entry to room.    General Precautions: Standard, fall   Orthopedic Precautions: N/A   Braces: N/A    Respiratory Status: Room air    Occupational Performance    Gait belt applied - Yes    Bed Mobility:   Scooting anteriorly to EOB to have both feet planted on floor: moderate assistance and of 2 persons  Supine to sit from left side of bed with moderate assistance    Functional Mobility/Transfers:  Sit <> Stand Transfer with minimum assistance with rolling walker  Bed <> Chair Transfer using Step Transfer technique with moderate assistance with rolling walker  Functional Mobility: NT at this time. OT to further assess at upcoming sessions.     Activities of Daily Living:  Lower Body Dressing: maximal assistance    Cognitive/Visual Perceptual:  Cognitive/Psychosocial Skills:    -     Oriented to: Person, Time, and Situation    Physical Exam:  Upper Extremity Range of Motion:     -       Right Upper Extremity: proximally grossly 1/4 AROM; distally grossly WFL   -       Left Upper  Extremity: proximally grossly 1/2 AROM; distally grossly WFL  Upper Extremity Strength:    -       Right Upper Extremity: proximally grossly 2-/5; distally grossly 3+/5  -       Left Upper Extremity: proximally grossly 2/5; distally grossly 3+/5      AMPAC 6 Click ADL:  AMPAC Total Score: 15    Treatment & Education:  Therapist provided facilitation and instruction of proper body mechanics, energy conservation, and fall prevention strategies during tasks listed above  Patient educated on role of OT, POC, and goals for therapy  Patient educated on importance of OOB activities with staff member assistance and sitting OOB majority of the day    Patient clear to perform kenan t/fs c staff only at this time.    Patient left up in chair with all lines intact, call button in reach, RN notified, and chair alarm on.    GOALS:   Multidisciplinary Problems       Occupational Therapy Goals          Problem: Occupational Therapy    Goal Priority Disciplines Outcome Interventions   Occupational Therapy Goal     OT, PT/OT Progressing    Description: Goals to be met by: 10/11/2024     Patient will increase functional independence with ADLs by performing:    UE Dressing with Set-up Assistance.  LE Dressing with Contact Guard Assistance c use of AE PRN.  Grooming while seated at sink with Set-up Assistance.  Toileting from toilet with Minimal Assistance for hygiene and clothing management.   Toilet transfer to toilet with Contact Guard Assistance.                         History:     No past medical history on file.      Past Surgical History:   Procedure Laterality Date    CATARACT EXTRACTION, BILATERAL      FOOT SURGERY      MASTECTOMY Right     TOTAL KNEE ARTHROPLASTY Bilateral        Time Tracking:     OT Date of Treatment: 09/11/24  OT Start Time: 0825  OT Stop Time: 0852  OT Total Time (min): 27 min    Billable Minutes: Evaluation 27    9/11/2024

## 2024-09-11 NOTE — PT/OT/SLP PROGRESS
Physical Therapy Treatment Note           Name: Baudilio Mantilla    : 1932 (89 y.o.)  MRN: [id number]           TREATMENT SUMMARY AND RECOMMENDATIONS:    PT Received On: 24  PT Start Time: 1245     PT Stop Time: 1315  PT Total Time (min): 30 min     Subjective Assessment:   No complaints  Lethargic   x Awake, alert, cooperative  Uncooperative    Agitated  c/o pain    Appropriate  c/o fatigue    Confused x Treated at bedside     Emotionally labile  Treated in gym/dept.    Impulsive  Other:    Flat affect       Therapy Precautions:    Cognitive deficits  Spinal precautions    Collar - hard  Sternal precautions    Collar - soft   TLSO   x Fall risk  LSO    Hip precautions - posterior  Knee immobilizer    Hip precautions - anterior  WBAT    Impaired communication  Partial weightbearing    Oxygen  TTWB    PEG tube  NWB    Visual deficits  Other:    Hearing deficits          Treatment Objectives:     Mobility Training:   Assist level Comments    Bed mobility MOD A x2- MAX A x2 MOD A x2: semi-supine > sit and rolling L/R  MAX A x2: sit > supine    Transfer MIN A Stand step transfer BSC > EOB using RW   Gait MIN A x2 ~10ft in room using RW with wide RYAN and step-to gait pattern; decreased stride STEF; RLE held in mild ER   Sit to stand transitions MIN A x2 From EOB and BSC levels    Sitting balance     Standing balance      Wheelchair mobility     Car transfer     Other:          Therapeutic Exercise:   Exercise Sets Reps Comments                               Additional Comments:  Co-tx performed c two skilled disciplines d/t complexity of pt's medical care and pt's limited ax tolerance.      Assessment: Patient tolerated session well. Patient has demonstrated improvements in functional mobility since hospital admission. She was able to ambulate ~10ft this PM with MIN A x2 persons. Patient requiring Max encouragement from PT/OT and granddaughter in room to participate. She will continue to benefit from  skilled PT services, decrease risk for falls, decrease caregiver burden, and increase QOL.     PT Plan: continue POC  Revisions made to plan of care: No    GOALS:   Multidisciplinary Problems       Physical Therapy Goals          Problem: Physical Therapy    Goal Priority Disciplines Outcome Goal Variances Interventions   Physical Therapy Goal     PT, PT/OT Progressing     Description: Goals to be met by: 24     Patient will increase functional independence with mobility by performin. Supine to sit with Contact Guard Assistance  2. Sit to supine with Contact Guard Assistance  3. Rolling to Left and Right with Stand-by Assistance.  4. Sit to stand transfer with Contact Guard Assistance  5. Bed to chair transfer with Contact Guard Assistance using Rolling Walker  6. Gait  x 25 feet with Contact Guard Assistance using Rolling Walker.                          Skilled PT Minutes Provided: 30 minutes    Communication with Treatment Team:     Equipment recommendations:       At end of treatment, patient remained:   Up in chair     Up in wheelchair in room   x In bed   x With alarm activated   x Bed rails up   x Call bell in reach    x Family/friends present    Restraints secured properly    In bathroom with CNA/RN notified    Nurse aware    In gym with therapist/tech    Other:

## 2024-09-11 NOTE — PT/OT/SLP PROGRESS
Occupational Therapy  Treatment    Name: Baudilio Mantilla    : 1932 (89 y.o.)  MRN: [id number]           TREATMENT SUMMARY AND RECOMMENDATIONS:      OT Date of Treatment: 24  OT Start Time: 1245  OT Stop Time: 1315  OT Total Time (min): 30 min      Subjective Assessment:   No complaints  Lethargic   X Awake, alert, cooperative  Impulsive    Uncooperative   Flat affect    Agitated  c/o pain    Appropriate  c/o fatigue    Confused X Treated at bedside     Emotionally labile  Treated in gym/dept.      Other:        Therapy Precautions:    Cognitive deficits  Spinal precautions    Collar - hard  Sternal precautions    Collar - soft   TLSO   X Fall risk  LSO    Hip precautions - posterior  Knee immobilizer    Hip precautions - anterior  WBAT    Impaired communication  Partial weightbearing    Oxygen  TTWB    PEG tube  NWB    Visual deficits      Hearing deficits   Other:        Treatment Objectives:     Mobility Training:    Mobility task Assist level Comments    Bed mobility Mod A x2- Max A x2 Semi supine>EOB Mod A x2  EOB>supine Max A x2   Transfer     Sit to stands transitions Min A x2 From EOB   Functional mobility Min A x2 Pt ambulated ~10ft c RW.   Sitting balance     Standing balance      Other:        ADL Training:    ADL Assist level Comments    Feeding     Grooming/hygiene     Bathing     Upper body dressing     Lower body dressing     Toileting Max A  Assistance needed for hygiene mgmt and managing brief down.  Pt donned at bed level via rolling R/L  Pt refusing to assist c toileting despite Max v/cs for encouragement   Toilet transfer Min A Stand step t/f c RW BSC>EOB   Adaptive equipment training     Other:         Additional Comments:  Co-tx performed c two skilled disciplines d/t complexity of pt's medical care and pt's limited ax tolerance.     Assessment: Patient tolerated session well. Pt required max encouragement from OT/PT and pt's granddaughter for participation. Pt would continue to  benefit from skilled OT services to improve pt's safety and independence with daily occupations, decrease caregiver burden, and reduce pt's risk of falls.      GOALS:   Multidisciplinary Problems       Occupational Therapy Goals          Problem: Occupational Therapy    Goal Priority Disciplines Outcome Interventions   Occupational Therapy Goal     OT, PT/OT Progressing    Description: Goals to be met by: 10/11/2024     Patient will increase functional independence with ADLs by performing:    UE Dressing with Set-up Assistance.  LE Dressing with Contact Guard Assistance c use of AE PRN.  Grooming while seated at sink with Set-up Assistance.  Toileting from toilet with Minimal Assistance for hygiene and clothing management.   Toilet transfer to toilet with Contact Guard Assistance.                         Recommendations:     Discharge Equipment Recommendations:  none     Plan:     Patient to be seen  (5-6x/week (QD-BID)) to address the above listed problems via self-care/home management, therapeutic activities, therapeutic exercises  Plan of Care Expires: 10/11/24  Plan of Care Reviewed with: patient  Revisions made to plan of care: No      Skilled OT Minutes Provided: 30  Communication with Treatment Team:     Equipment recommendations:       At end of treatment, patient remained:   Up in chair     Up in wheelchair in room   X In bed   X With alarm activated   X Bed rails up   X Call bell in reach    X Family/friends present    Restraints secured properly    In bathroom with CNA/RN notified    In gym with PT/PTA/tech    Nurse aware    Other:

## 2024-09-11 NOTE — PLAN OF CARE
[facility] - Medical Surgical Unit  Initial Discharge Assessment       Primary Care Provider: Saman Kearns II, MD    Admission Diagnosis: Acute on chronic systolic heart failure [I50.23]    Admission Date: 9/10/2024  Expected Discharge Date:     Transition of Care Barriers: None    Payor: [facility] MCARE / Plan: Private Practice GROUP MEDICARE ADVANTAGE / Product Type: Medicare Advantage /     Extended Emergency Contact Information  Primary Emergency Contact: jose manuel walker  Mobile Phone: [phone]  Relation: Daughter  Preferred language: English   needed? No  Secondary Emergency Contact: Margarita Tidwell  Mobile Phone: [phone]  Relation: Grandchild  Preferred language: English    Discharge Plan A: Home, Home with family, Home Health         [facility] [address] [address]  Phone: [phone] Fax: [phone]    [id number] [place], [place] - [address]  AT [facility]  [address] [place] [id number]  Phone: [phone] Fax: [phone]      Initial Assessment (most recent)       Adult Discharge Assessment - 09/11/24 0817          Discharge Assessment    Assessment Type Discharge Planning Assessment     Confirmed/corrected address, phone number and insurance Yes     Confirmed Demographics Correct on Facesheet     Source of Information patient;family     Reason For Admission CHF     People in Home alone     Facility Arrived From: Home     Do you expect to return to your current living situation? Yes     Do you have help at home or someone to help you manage your care at home? Yes     Who are your caregiver(s) and their phone number(s)? [phone]     Prior to hospitilization cognitive status: Alert/Oriented     Current cognitive status: Alert/Oriented     Walking or Climbing Stairs Difficulty yes     Walking or Climbing Stairs ambulation difficulty, requires  equipment     Dressing/Bathing Difficulty yes     Dressing/Bathing bathing difficulty, requires equipment     Home Layout Able to live on 1st floor     Equipment Currently Used at Home walker, rolling;cane, straight;shower chair;rollator     Readmission within 30 days? No     Patient currently being followed by outpatient case management? No     Do you currently have service(s) that help you manage your care at home? No     Do you take prescription medications? Yes     Do you have prescription coverage? Yes     Do you have any problems affording any of your prescribed medications? TBD     Is the patient taking medications as prescribed? yes     Who is going to help you get home at discharge? [phone]     How do you get to doctors appointments? family or friend will provide     Are you on dialysis? No     Do you take coumadin? No     Discharge Plan A Home;Home with family;Home Health     DME Needed Upon Discharge  other (see comments)   TBD    Discharge Plan discussed with: Patient;Adult children     Transition of Care Barriers None        Physical Activity    On average, how many days per week do you engage in moderate to strenuous exercise (like a brisk walk)? 0 days     On average, how many minutes do you engage in exercise at this level? 0 min        Financial Resource Strain    How hard is it for you to pay for the very basics like food, housing, medical care, and heating? Not hard at all        Housing Stability    In the last 12 months, was there a time when you were not able to pay the mortgage or rent on time? No     At any time in the past 12 months, were you homeless or living in a shelter (including now)? No        Transportation Needs    Has the lack of transportation kept you from medical appointments, meetings, work or from getting things needed for daily living? No        Food Insecurity    Within the past 12 months, you worried that your food would run out before you got the money  to buy more. Never true     Within the past 12 months, the food you bought just didn't last and you didn't have money to get more. Never true        Stress    Do you feel stress - tense, restless, nervous, or anxious, or unable to sleep at night because your mind is troubled all the time - these days? Only a little        Social Isolation    How often do you feel lonely or isolated from those around you?  Rarely        Alcohol Use    Q1: How often do you have a drink containing alcohol? Never     Q2: How many drinks containing alcohol do you have on a typical day when you are drinking? Patient does not drink     Q3: How often do you have six or more drinks on one occasion? Never        Utilities    In the past 12 months has the electric, gas, oil, or water company threatened to shut off services in your home? No        Health Literacy    How often do you need to have someone help you when you read instructions, pamphlets, or other written material from your doctor or pharmacy? Rarely

## 2024-09-12 PROCEDURE — 92610 EVALUATE SWALLOWING FUNCTION: CPT

## 2024-09-12 PROCEDURE — 97530 THERAPEUTIC ACTIVITIES: CPT

## 2024-09-12 PROCEDURE — A9698 NON-RAD CONTRAST MATERIALNOC: HCPCS | Performed by: STUDENT IN AN ORGANIZED HEALTH CARE EDUCATION/TRAINING PROGRAM

## 2024-09-12 PROCEDURE — 25500020 PHARM REV CODE 255: Performed by: STUDENT IN AN ORGANIZED HEALTH CARE EDUCATION/TRAINING PROGRAM

## 2024-09-12 PROCEDURE — 63600175 PHARM REV CODE 636 W HCPCS: Performed by: PHYSICIAN ASSISTANT

## 2024-09-12 PROCEDURE — 99900035 HC TECH TIME PER 15 MIN (STAT)

## 2024-09-12 PROCEDURE — 92611 MOTION FLUOROSCOPY/SWALLOW: CPT

## 2024-09-12 PROCEDURE — 94760 N-INVAS EAR/PLS OXIMETRY 1: CPT

## 2024-09-12 PROCEDURE — 25000003 PHARM REV CODE 250: Performed by: PHYSICIAN ASSISTANT

## 2024-09-12 PROCEDURE — 11000004 HC SNF PRIVATE

## 2024-09-12 RX ADMIN — BARIUM SULFATE 57 ML: 980 POWDER, FOR SUSPENSION ORAL at 10:09

## 2024-09-12 RX ADMIN — BARIUM SULFATE 10 G: 0.6 CREAM ORAL at 10:09

## 2024-09-12 RX ADMIN — METOPROLOL SUCCINATE 12.5 MG: 25 TABLET, EXTENDED RELEASE ORAL at 08:09

## 2024-09-12 RX ADMIN — BUMETANIDE 2 MG: 1 TABLET ORAL at 08:09

## 2024-09-12 RX ADMIN — APIXABAN 2.5 MG: 2.5 TABLET, FILM COATED ORAL at 08:09

## 2024-09-12 RX ADMIN — DICLOFENAC SODIUM 2 G: 10 GEL TOPICAL at 08:09

## 2024-09-12 RX ADMIN — COLCHICINE 0.6 MG: 0.6 TABLET, FILM COATED ORAL at 08:09

## 2024-09-12 RX ADMIN — SPIRONOLACTONE 25 MG: 25 TABLET ORAL at 08:09

## 2024-09-12 RX ADMIN — METHIMAZOLE 2.5 MG: 5 TABLET ORAL at 08:09

## 2024-09-12 RX ADMIN — MICONAZOLE NITRATE: 20 CREAM TOPICAL at 08:09

## 2024-09-12 RX ADMIN — SERTRALINE HYDROCHLORIDE 50 MG: 50 TABLET ORAL at 08:09

## 2024-09-12 RX ADMIN — PREDNISONE 20 MG: 20 TABLET ORAL at 08:09

## 2024-09-12 NOTE — PLAN OF CARE
Problem: Adult Inpatient Plan of Care  Goal: Plan of Care Review  Outcome: Progressing  Flowsheets (Taken 9/12/2024 0946)  Plan of Care Reviewed With: patient  Goal: Patient-Specific Goal (Individualized)  Outcome: Progressing  Flowsheets (Taken 9/12/2024 0946)  Individualized Care Needs: pain management, encourage pt/ot, daily weight,  Anxieties, Fears or Concerns: left wrist pain  Goal: Absence of Hospital-Acquired Illness or Injury  Outcome: Progressing  Intervention: Identify and Manage Fall Risk  Flowsheets (Taken 9/12/2024 0946)  Safety Promotion/Fall Prevention:   assistive device/personal item within reach   bed alarm set   gait belt with ambulation   nonskid shoes/socks when out of bed   instructed to call staff for mobility   side rails raised x 2  Goal: Optimal Comfort and Wellbeing  Outcome: Progressing  Intervention: Monitor Pain and Promote Comfort  Flowsheets (Taken 9/12/2024 0946)  Pain Management Interventions: care clustered  Goal: Readiness for Transition of Care  Outcome: Progressing     Problem: Infection  Goal: Absence of Infection Signs and Symptoms  Outcome: Progressing  Intervention: Prevent or Manage Infection  Flowsheets (Taken 9/12/2024 0946)  Infection Management: aseptic technique maintained  Isolation Precautions: protective     Problem: Wound  Goal: Optimal Coping  Outcome: Progressing  Intervention: Support Patient and Family Response  Flowsheets (Taken 9/12/2024 0946)  Supportive Measures: active listening utilized  Family/Support System Care:   self-care encouraged   support provided  Goal: Optimal Functional Ability  Outcome: Progressing  Goal: Absence of Infection Signs and Symptoms  Outcome: Progressing  Intervention: Prevent or Manage Infection  Flowsheets (Taken 9/12/2024 0946)  Infection Management: aseptic technique maintained  Isolation Precautions: protective  Goal: Improved Oral Intake  Outcome: Progressing  Intervention: Promote and Optimize Oral Intake  Flowsheets  (Taken 9/12/2024 0946)  Oral Nutrition Promotion: adaptive equipment use encouraged  Goal: Optimal Pain Control and Function  Outcome: Progressing  Intervention: Prevent or Manage Pain  Flowsheets (Taken 9/12/2024 0946)  Sleep/Rest Enhancement: awakenings minimized  Pain Management Interventions: care clustered  Goal: Skin Health and Integrity  Outcome: Progressing  Intervention: Optimize Skin Protection  Flowsheets (Taken 9/12/2024 0946)  Pressure Reduction Techniques: frequent weight shift encouraged  Skin Protection: incontinence pads utilized  Activity Management: Rolling - L1  Head of Bed (HOB) Positioning: HOB at 20-30 degrees  Goal: Optimal Wound Healing  Outcome: Progressing     Problem: Skin Injury Risk Increased  Goal: Skin Health and Integrity  Outcome: Progressing  Intervention: Optimize Skin Protection  Flowsheets (Taken 9/12/2024 0946)  Pressure Reduction Techniques: frequent weight shift encouraged  Skin Protection: incontinence pads utilized  Activity Management: Rolling - L1  Head of Bed (HOB) Positioning: HOB at 20-30 degrees     Problem: Heart Failure  Goal: Optimal Cardiac Output  Outcome: Progressing  Intervention: Optimize Cardiac Output  Flowsheets (Taken 9/12/2024 0946)  Environmental Support: calm environment promoted  Goal: Stable Heart Rate and Rhythm  Outcome: Progressing  Goal: Fluid and Electrolyte Balance  Outcome: Progressing  Intervention: Monitor and Manage Fluid and Electrolyte Balance  Flowsheets (Taken 9/12/2024 0946)  Fluid/Electrolyte Management: fluids provided     Problem: Fall Injury Risk  Goal: Absence of Fall and Fall-Related Injury  Outcome: Progressing  Intervention: Identify and Manage Contributors  Flowsheets (Taken 9/12/2024 0946)  Self-Care Promotion:   independence encouraged   adaptive equipment use encouraged   BADL personal objects within reach  Medication Review/Management: medications reviewed

## 2024-09-12 NOTE — PROGRESS NOTES
[facility] - Medical Surgical Unit  [facility] MEDICINE ~ PROGRESS NOTE    CHIEF COMPLAINT     Hospital follow up for debility and acute on chronic HFrEF    HOSPITAL COURSE     89-year-old female with a past medical history of CHF, AFib, HTN, HLD a CKD, hypothyroidism, gout, arthritis, vitamin-D deficiency who presented to [facility] on 09/04/2024 acute on chronic HFrEF.  BNP was elevated on arrival CXR showed cardiomegaly.  She initially required supplemental oxygen.  Echo obtained showed EF 40-45% and diastolic dysfunction.  Patient was evaluated by Cardiology who initially had patient on IV Lasix and switch to home Bumex and spironolactone.  Eliquis was decreased to 2.5 mg b.i.d. due to age and renal function.  Patient was also initiated on metoprolol succinate 2.5 mg daily.  Patient continued to complain arthritic pain.  Patient was initiated on colchicine on 09/06/2024.  She was discharged on prednisone 20 mg daily for 3 days.  Patient was transferred to our [facility] for continued therapy.  Labs yesterday revealed BUN 90, creatinine around baseline.  Repeat labs today showed BUN improved to 80 with creatinine also improved.  Patient continues to complain of arthritic pain, adding lidocaine patches as needed.  Patient will be receiving therapy evaluations today.     Today:  Patient's only complaint is rectal pain. Denies any arthritic associated pain today.      OBJECTIVE/PHYSICAL EXAM     VITAL SIGNS (MOST RECENT):  Temp: 98.2 °F (36.8 °C) (09/12/24 0740)  Pulse: 72 (09/12/24 0822)  Resp: 18 (09/11/24 1940)  BP: 121/72 (09/12/24 0822)  SpO2: (!) 93 % (09/12/24 0740) VITAL SIGNS (24 HOUR RANGE):  Temp:  [97.8 °F (36.6 °C)-98.3 °F (36.8 °C)] 98.2 °F (36.8 °C)  Pulse:  [59-72] 72  Resp:  [18] 18  SpO2:  [90 %-97 %] 93 %  BP: ()/(50-89) 121/72     GENERAL: In no acute distress, afebrile  HEENT: Atraumatic, normocephalic, moist mucous membranes   CHEST: Clear to auscultation bilaterally  HEART: S1, S2,  no appreciable murmur  ABDOMEN: Soft, nontender, BS +  MSK: Warm, no lower extremity edema, no clubbing or cyanosis  NEUROLOGIC: Alert and oriented x4, moving all extremities with good strength   INTEGUMENTARY: See media for gluteal and vaginal areas    ASSESSMENT/PLAN     Acute on chronic HFrEF  Echo EF 40-45% and diastolic dysfunction  Continue home Bumex 2 mg daily, spironolactone 25 mg daily  Daily weights, strict I's and O's     Afib  HTN  Continue metoprolol succinate 12.5 mg daily  Continue Eliquis 2.5 mg b.i.d., decreased at previous facility due to age and renal function     CKD  Monitor renal function closely with Bumex 2 mg daily and spironolactone 25 mg daily  Renal function improved today from day prior  Monitor urine output     Gout, arthritis   Started on colchicine 0.6 mg daily on 09/06/2024  Continue prednisone 20 mg daily x3 days starting 09/11/2024  Left wrist x-ray 09/06/2024 showed inflammatory arthritis with degenerative arthritis at the triscaphe join  Lidocaine patch/Voltaren gel as needed    Vaginal lesions  Gluteal wound  Gluteal wound culture pending      Hx of HLD, hypothyroidism, vitamin-D deficiency, depression      DVT prophylaxis:  Eliquis at decreased dose of 2.5 mg b.i.d.    Anticipated discharge and disposition: Continue PT/OT  __________________________________________________________________________    LABS/MICRO/MEDS/DIAGNOSTICS     LABS  Recent Labs     09/10/24  0503 09/11/24  0911    139   K 4.7 4.6   CO2 24 27   BUN 90.0* 80.8*   CREATININE 1.75* 1.47*   GLUCOSE 145* 140*   CALCIUM 9.6 10.2   ALKPHOS 92  --    AST 30  --    ALT 38  --    ALBUMIN 2.7*  --      Recent Labs     09/10/24  0503   WBC 14.76*   RBC 4.66   HCT 43.7   MCV 93.8        MICROBIOLOGY  Microbiology Results (last 7 days)       Procedure Component Value Units Date/Time    Wound Culture [[id number]] Collected: 09/11/24 1533    Order Status: Sent Specimen: Drainage Updated: 09/11/24 1533          "    MEDICATIONS   apixaban  2.5 mg Oral BID    bumetanide  2 mg Oral Daily    colchicine  0.6 mg Oral Daily    methIMAzole  2.5 mg Oral Daily    metoprolol succinate  12.5 mg Oral Daily    miconazole   Topical (Top) BID    predniSONE  20 mg Oral Daily    sertraline  50 mg Oral Daily    spironolactone  25 mg Oral Daily         INFUSIONS       DIAGNOSTIC TESTS  Fl Modified Barium Swallow Speech   Final Result         No results found for: "EF"     NUTRITION STATUS  Patient meets ASPEN criteria for   malnutrition of   per RD assessment as evidenced by:                       A minimum of two characteristics is recommended for diagnosis of either severe or non-severe malnutrition.     Case related differential diagnoses have been reviewed; assessment and plan has been documented. I have personally reviewed the labs and test results that are currently available; I have reviewed the patients medication list. I have reviewed the consulting providers recommendations. I have reviewed or attempted to review medical records based upon their availability.  All of the patient's and/or family's questions have been addressed and answered to the best of my ability.  I will continue to monitor closely and make adjustments to medical management as needed.  This document was created using M*Modal Fluency Direct.  Transcription errors may have been made.  Please contact me if any questions may rise regarding documentation to clarify transcription.      RACHEL Awan   Internal Medicine  Department of Hospital Medicine [facility] - [facility] Surgical Unit    "

## 2024-09-12 NOTE — PLAN OF CARE
[facility] - Medical Surgical Unit  Discharge Reassessment    Primary Care Provider: Saman Kearns II, MD    Expected Discharge Date:     Reassessment (most recent)       Discharge Reassessment - 09/12/24 7627          Discharge Reassessment    Assessment Type Discharge Planning Reassessment     Did the patient's condition or plan change since previous assessment? No     Discharge Plan discussed with: Patient;Adult children     Discharge Plan A Home;Home Health     DME Needed Upon Discharge  other (see comments)   TBD    Transition of Care Barriers None     Why the patient remains in the hospital Requires continued medical care        Post-Acute Status    Discharge Delays None known at this time

## 2024-09-12 NOTE — PROCEDURES
Modified Barium Swallow    Patient Name:  Baudilio Mantilla   MRN:  [id number]      Recommendations:     Recommendations:                General Recommendations:  Dysphagia therapy  Diet recommendations:   ,     Aspiration Precautions: Assistance with meals and Assistance with thickening liquids, Chin tuck, HOB to 90 degrees, Meds crushed in puree, Monitor for s/s of aspiration, No straws, Remain upright 30 minutes post meal, and Strict aspiration precautions   General Precautions: Standard,    Communication strategies:  provide increased time to answer    Referral     Reason for Referral  Patient was referred for a Modified Barium Swallow Study to assess the efficiency of his/her swallow function, rule out aspiration and make recommendations regarding safe dietary consistencies, effective compensatory strategies, and safe eating environment.     Diagnosis: Acute on chronic systolic heart failure       History:     No past medical history on file.    Objective:     Current Respiratory Status:      Alert: yes    Cooperative: yes    Follows Directions: yes    Visualization  Patient was seen in the lateral view    Oral Peripheral Examination   Pt with good symmetry noted.     Consistencies Assessed  Thin tsp sip with immediate posterior loss over the BOT to the valleculae, aspiration visualized prior and during the swallow with reduced epiglottic inversion, piecemeal deglutition noted, unproductive spontaneous cough, moderate pharyngeal wall and valleculae residue after initail swallow improved to minimal residue post cued second swallow.   Nectar thick tsp - prespill to the valleculae with reduced epiglottic inversion, valleculae and pharyngeal wall residue post swallow. Cup sips consecutive (natural sip) loss of liquids to the floor of the mouth, decreased A/P transit, posterior loss of the bolus to the pyriform, and piecemeal deglutition of bolus resulting in aspiration before the swallow. Pt with MILD pharyngeal wall  and valleculae reside from previous and current trial. Cup sip with chin tuck with improved bolus control, pre spill into valleculae with no visualized aspiration or penetration. Pt requried cues for second swallow to reduce pharyngeal residue.   Puree Prolonged bolus manipulation with piecemeal deglutition visualized. Pt with mild BOT and pharyngeal residue noted.    Soft solids   Minced and Moist: pt with prolonged bolus manipulation, perseverative chewing, piecemeal deglutition with decreased hyoid excursion with MILD BOT and pharyngeal residue. Residue improved with mildly thick liquid wash.     Oral Preparation/Oral Phase  Anterior loss of bolus bolus  Poor bolus formation and control  prolonged A-P transfer  prolonged mastication  Oral residue present post swallow  Decreased base of tongue mobility  Premature spillage    Pharyngeal Phase   Pt with reduced hyoid excursion, reduced epiglottic inversion, reduced laryngeal elevation, VF closure and reduced pharyngeal wave resulting in aspiration of liquids producing a nonproductive cough.     Cervical Esophageal Phase  UES appeared to accommodate all bolus types without stasis or retrograde movement observed     Assessment:     Impressions    Patient demonstrates moderate  Oropharyngeal  dysphagia characterized by reduced A/P transit, reduce BOT retraction, reduced laryngeal elevation, reduced hyoid excursion, reduced epiglottic inversion, delayed swallow reflex, and reduced pharyngeal wall squeeze resulting in positiioning techniques and diet modification to reduce aspiration or penetration.     Prognosis: Fair    Barriers:  Fatigue  Poor intake    Plan  3-5x/week dysphagia therapy     Education  Results were discussed with patient. Results were discussed with Medical Team who was in agreement with plan.     Goals:   Multidisciplinary Problems       SLP Goals          Problem: SLP    Goal Priority Disciplines Outcome   SLP Goal     SLP Progressing   Description:  Short Term   1. Pt will complete URIEL and PSE with SUP cues and improved swallow function on 80% trials.   2. Pt will complete mildly thick liquid trials without chin tuck on 5/5 trials with no overt signs/sx aspiration or penetration.   3 Pt will utilize swallow strategies with SUP level on 5/5 trials.     Long Term   1. Pt will tolerate least restrictive diet with no overt signs/sx aspiration or penetration.                        Plan:   Patient to be seen:  3-5x/week     Plan of Care expires:  10/4/2024   Plan of Care reviewed with:  patient and medical team          Discharge recommendations:  standard aspiration precautions, chin tuck, asst at home and/or long-term care facility     Barriers to Discharge:  Decreased Care Giver Support    Time Tracking:   SLP Treatment Date:    09/12/2024  Speech Start Time:  10:00   Speech Stop Time:  10:45      Speech Total Time (min):  45     09/12/2024

## 2024-09-12 NOTE — CONSULTS
[facility]  Wound Care    Patient Name:  Baudilio Mantilla   MRN:  [id number]  Date: 9/12/2024  Diagnosis: Acute on chronic systolic heart failure    History:     No past medical history on file.    Social History     Socioeconomic History    Marital status:    Tobacco Use    Smoking status: Never    Smokeless tobacco: Never   Substance and Sexual Activity    Alcohol use: Never    Drug use: Never    Sexual activity: Never     Social Determinants of Health     Financial Resource Strain: Low Risk  (9/11/2024)    Overall Financial Resource Strain (CARDIA)     Difficulty of Paying Living Expenses: Not hard at all   Food Insecurity: No Food Insecurity (9/11/2024)    Hunger Vital Sign     Worried About Running Out of Food in the Last Year: Never true     Ran Out of Food in the Last Year: Never true   Transportation Needs: No Transportation Needs (9/11/2024)    TRANSPORTATION NEEDS     Transportation : No   Physical Activity: Inactive (9/11/2024)    Exercise Vital Sign     Days of Exercise per Week: 0 days     Minutes of Exercise per Session: 0 min   Stress: No Stress Concern Present (9/11/2024)    [employer] [place] [employer] - Occupational Stress Questionnaire     Feeling of Stress : Only a little   Housing Stability: Low Risk  (9/11/2024)    Housing Stability Vital Sign     Unable to Pay for Housing in the Last Year: No     Homeless in the Last Year: No       Precautions:     Allergies as of 09/10/2024 - Reviewed 09/10/2024   Allergen Reaction Noted    Allopurinol analogues  09/10/2024    Penicillin Rash 11/15/2022       [facility] Assessment Details/Treatment     New wound consult obtained.  Pt transferred from [facility] for additional rehabilitation s/p acute on chronic CHF exacerbation.   Area to coccyx previously identified as an SDTI upon admit at [facility] has now evolved as a Stg 2 with small pink non granulating partial thickness area to coccyx with area of abraded dry intact skin  to R lower buttock/perirectal area.  No odor present. Cleansed well with soap and water, applied non adherent silicone foam dressing to area.  Recommend every other day dressing changes to coccyx / sacrum due to incontinent episodes.  Irritation/erythema present to labial area.  Suspect irritation from purewick use.   Recommend no purewick, application of Zguard barrier paste BID and prn soilage/pericare.  Pressure prevention measures needed while hospitalized with moisture management, frequent brief check, turn q 2 hr assistance, floating heels.   Discussed importance of pressure prevention measures with pt while hospitalized.  Verbalized understanding.           09/12/24 0931   WOCN Assessment   WOCN Total Time (mins) 20   Visit Date 09/12/24   Visit Time 0931   Consult Type New   WOCN Speciality Wound   Wound pressure;moisture   Continence Type Urinary;Fecal   Intervention assessed;chart review;orders   Teaching on-going   Skin Interventions   Device Skin Pressure Protection positioning supports utilized;tubing/devices free from skin contact;pressure points protected   Pressure Reduction Devices pressure-redistributing mattress utilized;positioning supports utilized;heel offloading device utilized   Pressure Reduction Techniques heels elevated off bed;pressure points protected;weight shift assistance provided;positioned off wounds   Skin Protection skin sealant/moisture barrier applied;silicone foam dressing in place;incontinence pads utilized   Positioning   Body Position foot of bed elevated;heels elevated;sitting up in bed   Head of Bed (HOB) Positioning HOB at 30 degrees   Positioning/Transfer Devices pillows;in use   Pressure Injury Prevention    Check Moisture Management Pad Done   Sacral Foam Dressing Apply   Heel protection technique Pillow   Check Medical Devices Done        Wound 09/04/24 1255 Pressure Injury Coccyx   Date First Assessed/Time First Assessed: 09/04/24 1255   Present on Original  Admission: Yes  Primary Wound Type: Pressure Injury  Location: Coccyx  Is this injury device related?: No   Wound Image     Pressure Injury Stage 2   Dressing Appearance Open to air   Drainage Amount Scant   Drainage Characteristics/Odor Serous   Appearance Pink;Not granulating;Red;Dry  (abrasion to R lower buttocks/perirectal area, no open areas noted to abrasion)   Tissue loss description Partial thickness   Periwound Area Dry;Other (see comments)  (abrasion to R lower buttock/perirectal area)   Wound Edges Defined   Wound Length (cm) 8 cm  (entire open area/abraded area measured)   Wound Width (cm) 10 cm  (entire open area/abraded area measured)   Wound Depth (cm) 0.1 cm   Wound Volume (cm^3) 8 cm^3   Wound Surface Area (cm^2) 80 cm^2   Care Cleansed with:;Soap and water   Dressing Applied;Non-adherent;Silicone;Foam   Periwound Care Dry periwound area maintained   Dressing Change Due 09/14/24        Recommendations made to primary team . Orders placed.     09/12/2024

## 2024-09-12 NOTE — PLAN OF CARE
Problem: Adult Inpatient Plan of Care  Goal: Plan of Care Review  Outcome: Progressing  Flowsheets (Taken 9/11/2024 1953)  Plan of Care Reviewed With: patient  Goal: Patient-Specific Goal (Individualized)  Outcome: Progressing  Flowsheets (Taken 9/11/2024 1953)  Individualized Care Needs: Left wrist pain management, Therapy, Daily weight, Accurate I&Os, Wound consult  Anxieties, Fears or Concerns: Left wrist pain  Goal: Absence of Hospital-Acquired Illness or Injury  Outcome: Progressing  Intervention: Identify and Manage Fall Risk  Flowsheets (Taken 9/11/2024 1953)  Safety Promotion/Fall Prevention:   assistive device/personal item within reach   bed alarm set   lighting adjusted   medications reviewed   muscle strengthening facilitated   nonskid shoes/socks when out of bed   room near unit station  Intervention: Prevent Skin Injury  Flowsheets (Taken 9/11/2024 1953)  Body Position: sitting up in bed  Skin Protection:   incontinence pads utilized   skin sealant/moisture barrier applied   transparent dressing maintained  Device Skin Pressure Protection:   absorbent pad utilized/changed   positioning supports utilized   tubing/devices free from skin contact  Intervention: Prevent and Manage VTE (Venous Thromboembolism) Risk  Flowsheets (Taken 9/11/2024 1953)  VTE Prevention/Management:   bleeding risk assessed   bleeding precautions maintained   ROM (active) performed   fluids promoted  Intervention: Prevent Infection  Flowsheets (Taken 9/11/2024 1953)  Infection Prevention:   cohorting utilized   hand hygiene promoted   single patient room provided  Goal: Optimal Comfort and Wellbeing  Outcome: Progressing  Intervention: Monitor Pain and Promote Comfort  Flowsheets (Taken 9/11/2024 1953)  Pain Management Interventions:   diversional activity provided   pillow support provided   position adjusted   quiet environment facilitated  Intervention: Provide Person-Centered Care  Flowsheets (Taken 9/11/2024 1953)  Trust  Relationship/Rapport:   care explained   choices provided   questions answered   thoughts/feelings acknowledged     Problem: Infection  Goal: Absence of Infection Signs and Symptoms  Outcome: Progressing  Intervention: Prevent or Manage Infection  Flowsheets (Taken 9/11/2024 1953)  Fever Reduction/Comfort Measures:   lightweight bedding   lightweight clothing  Infection Management: aseptic technique maintained  Isolation Precautions:   protective   precautions maintained     Problem: Wound  Goal: Optimal Coping  Outcome: Progressing  Intervention: Support Patient and Family Response  Flowsheets (Taken 9/11/2024 1953)  Supportive Measures:   active listening utilized   goal-setting facilitated   self-care encouraged  Family/Support System Care: self-care encouraged  Goal: Optimal Functional Ability  Outcome: Progressing  Intervention: Optimize Functional Ability  Flowsheets (Taken 9/11/2024 1953)  Assistive Device Utilized:   gait belt   walker  Activity Management: Rolling - L1  Activity Assistance Provided: assistance, 1 person  Goal: Absence of Infection Signs and Symptoms  Outcome: Progressing  Intervention: Prevent or Manage Infection  Flowsheets (Taken 9/11/2024 1953)  Fever Reduction/Comfort Measures:   lightweight bedding   lightweight clothing  Infection Management: aseptic technique maintained  Isolation Precautions:   protective   precautions maintained  Goal: Improved Oral Intake  Outcome: Progressing  Intervention: Promote and Optimize Oral Intake  Flowsheets (Taken 9/11/2024 1953)  Oral Nutrition Promotion:   calorie-dense foods provided   calorie-dense liquids provided   social interaction promoted   rest periods promoted  Goal: Optimal Pain Control and Function  Outcome: Progressing  Intervention: Prevent or Manage Pain  Flowsheets (Taken 9/11/2024 1953)  Sleep/Rest Enhancement:   consistent schedule promoted   regular sleep/rest pattern promoted   relaxation techniques promoted  Pain Management  Interventions:   diversional activity provided   pillow support provided   position adjusted   quiet environment facilitated  Goal: Skin Health and Integrity  Outcome: Progressing  Intervention: Optimize Skin Protection  Flowsheets (Taken 9/11/2024 1953)  Pressure Reduction Techniques:   frequent weight shift encouraged   rest period provided between sit times   weight shift assistance provided  Pressure Reduction Devices: positioning supports utilized  Skin Protection:   incontinence pads utilized   skin sealant/moisture barrier applied   transparent dressing maintained  Activity Management: Rolling - L1  Head of Bed (HOB) Positioning: HOB at 30 degrees  Goal: Optimal Wound Healing  Outcome: Progressing  Intervention: Promote Wound Healing  Flowsheets (Taken 9/11/2024 1953)  Sleep/Rest Enhancement:   consistent schedule promoted   regular sleep/rest pattern promoted   relaxation techniques promoted     Problem: Skin Injury Risk Increased  Goal: Skin Health and Integrity  Outcome: Progressing  Intervention: Optimize Skin Protection  Flowsheets (Taken 9/11/2024 1953)  Pressure Reduction Techniques:   frequent weight shift encouraged   rest period provided between sit times   weight shift assistance provided  Pressure Reduction Devices: positioning supports utilized  Skin Protection:   incontinence pads utilized   skin sealant/moisture barrier applied   transparent dressing maintained  Activity Management: Rolling - L1  Head of Bed (HOB) Positioning: HOB at 30 degrees  Intervention: Promote and Optimize Oral Intake  Flowsheets (Taken 9/11/2024 1953)  Oral Nutrition Promotion:   calorie-dense foods provided   calorie-dense liquids provided   social interaction promoted   rest periods promoted     Problem: Heart Failure  Goal: Optimal Cardiac Output  Outcome: Progressing  Intervention: Optimize Cardiac Output  Flowsheets (Taken 9/11/2024 1953)  Stabilization Measures: legs elevated  Environmental Support: calm  environment promoted  Goal: Stable Heart Rate and Rhythm  Outcome: Progressing  Goal: Fluid and Electrolyte Balance  Outcome: Progressing  Intervention: Monitor and Manage Fluid and Electrolyte Balance  Flowsheets (Taken 9/11/2024 1953)  Fluid/Electrolyte Management: fluids provided     Problem: Fall Injury Risk  Goal: Absence of Fall and Fall-Related Injury  Outcome: Progressing  Intervention: Identify and Manage Contributors  Flowsheets (Taken 9/11/2024 1953)  Self-Care Promotion:   BADL personal objects within reach   BADL personal routines maintained   adaptive equipment use encouraged  Medication Review/Management: medications reviewed  Intervention: Promote Injury-Free Environment  Flowsheets (Taken 9/11/2024 1953)  Safety Promotion/Fall Prevention:   assistive device/personal item within reach   bed alarm set   lighting adjusted   medications reviewed   muscle strengthening facilitated   nonskid shoes/socks when out of bed   room near unit station

## 2024-09-12 NOTE — PROGRESS NOTES
Inpatient Nutrition Assessment    Admit Date: 9/10/2024   Total duration of encounter: 2 days   Patient Age: 89 y.o.    Nutrition Recommendation/Prescription     Continue heart healthy minced and moist mildly thick liquids (IDDSI level 2) 2. Add boost plus daily provides 360 kcal and 14 gm of protein per serving. 3. Monitor intake, wt, labs,medications    Communication of Recommendations: reviewed with patient    Nutrition Assessment     Malnutrition Assessment/Nutrition-Focused Physical Exam    Malnutrition Context: acute illness or injury (09/12/24 1445)  Malnutrition Level: mild (09/12/24 1445)  Energy Intake (Malnutrition): less than 75% for greater than or equal to 1 month (09/12/24 1445)  Weight Loss (Malnutrition): other (see comments) (wt loss unsure) (09/12/24 1445)  Subcutaneous Fat (Malnutrition): other (see comments) (doesn't meet criteria) (09/12/24 1445)  Orbital Region (Subcutaneous Fat Loss): well nourished  Upper Arm Region (Subcutaneous Fat Loss): well nourished     Muscle Mass (Malnutrition): other (see comments) (doesn't meet criteria) (09/12/24 1445)  Mormon Region (Muscle Loss): well nourished                       Fluid Accumulation (Malnutrition): other (see comments) (doesn't meet criteria) (09/12/24 1445)  Hand  Strength, Left (Malnutrition): unable to evaluate (09/12/24 1445)  Hand  Strength, Right (Malnutrition): unable to evaluate (09/12/24 1445)  A minimum of two characteristics is recommended for diagnosis of either severe or non-severe malnutrition.    Chart Review    Reason Seen: continuous nutrition monitoring and length of stay    Malnutrition Screening Tool Results   Have you recently lost weight without trying?: No  Have you been eating poorly because of a decreased appetite?: No   MST Score: 0   Diagnosis:  Acute on chronic HFrEF   Afib  HTN  CKD  Gout arthritis  Vaginal lesions  Gluteal wound    Relevant Medical History: CHF, AFib, HTN, HLD a CKD, hypothyroidism, gout,  arthritis, vitamin-D deficiency     Scheduled Medications:  apixaban, 2.5 mg, BID  bumetanide, 2 mg, Daily  colchicine, 0.6 mg, Daily  methIMAzole, 2.5 mg, Daily  metoprolol succinate, 12.5 mg, Daily  miconazole, , BID  predniSONE, 20 mg, Daily  sertraline, 50 mg, Daily  spironolactone, 25 mg, Daily    Continuous Infusions:   PRN Medications:  acetaminophen, 650 mg, Q4H PRN  albuterol-ipratropium, 3 mL, Q6H PRN  aluminum-magnesium hydroxide-simethicone, 30 mL, QID PRN  bisacodyL, 10 mg, Daily PRN  diclofenac sodium, 2 g, TID PRN  HYDROcodone-acetaminophen, 1 tablet, Q6H PRN  LIDOcaine, 1 patch, Q24H PRN  melatonin, 9 mg, Nightly PRN  morphine, 2 mg, Q6H PRN  naloxone, 0.02 mg, PRN  ondansetron, 8 mg, Q8H PRN  ondansetron, 4 mg, Q8H PRN  polyethylene glycol, 17 g, TID PRN  simethicone, 1 tablet, QID PRN  sodium chloride 0.9%, 10 mL, PRN  traMADoL, 50 mg, Q6H PRN    Calorie Containing IV Medications: no significant kcals from medications at this time    Recent Labs   Lab 09/05/24  1644 09/06/24  0507 09/06/24  1502 09/07/24  0545 09/08/24  0543 09/09/24  1248 09/10/24  0503 09/11/24  0911   * 135*  --  139 141 141 137 139   K 4.2 4.2  --  3.9 3.9 4.5 4.7 4.6   CALCIUM 10.0 10.0  --  10.0 10.3* 9.8 9.6 10.2   MG 2.30 2.40  --   --   --   --   --   --     102  --  105 102 101 101 102   CO2 23 24  --  24 29 29 24 27   BUN 85.6* 84.3*  --  89.7* 85.3* 77.3* 90.0* 80.8*   CREATININE 1.65* 1.48*  --  1.28* 1.33* 1.53* 1.75* 1.47*   EGFRNORACEVR 29 33  --  39 38 32 27 33   GLUCOSE 180* 131*  --  123* 103 184* 145* 140*   BILITOT 0.7 0.6  --  0.9  --  1.4 1.0  --    ALKPHOS 82 77  --  86  --  93 92  --    ALT 11 11  --  34  --  42 38  --    AST 17 19  --  44*  --  31 30  --    ALBUMIN 2.7* 2.6* 2.4* 2.7*  --  2.9* 2.7*  --    WBC 10.36 9.25  --  9.58  --  9.87 14.76*  --    HGB 12.8 12.3  --  13.0  --  15.1 14.7  --    HCT 38.7 37.2  --  38.7  --  46.8 43.7  --      Nutrition Orders:  Diet Heart Healthy Minced &  "Moist (IDDSI Level 5); Mildly Thick Liquids (IDDSI Level 2)      Appetite/Oral Intake: poor/25-50% of meals  Factors Affecting Nutritional Intake: decreased appetite, difficulty/impaired swallowing, and respiratory status  Social Needs Impacting Access to Food: none identified  Food/Taoism/Cultural Preferences: none reported  Food Allergies:  Last Bowel Movement: 09/11/24  Wound(s):     Wound 09/04/24 1255 Pressure Injury Coccyx-Tissue loss description: Partial thickness     Comments    09/12/24: Pt reports decrease intake for about month, less than 75% of meals. Pt ate 50% of breakfast this am, all grits, sausage, few bites eggs, juice, milk. Discussed food preferences to improved intake. Pt agree to drink boost supplement. SLP completed MBSS, recommend minced and moist mildly thick liquids. Will monitor.     Anthropometrics    Height: 5' 3" (160 cm),    Last Weight: 76.4 kg (168 lb 6.9 oz) (09/12/24 1443), Weight Method: Bed Scale  BMI (Calculated): 29.8  BMI Classification: overweight (BMI 25-29.9)     Ideal Body Weight (IBW), Female: 115 lb     % Ideal Body Weight, Female (lb): 146.46 %                             Usual Weight Provided By: patient denies unintentional weight loss    Wt Readings from Last 5 Encounters:   09/12/24 76.4 kg (168 lb 6.9 oz)   09/08/24 78.1 kg (172 lb 2.9 oz)   03/06/24 69.9 kg (154 lb)   02/18/24 69.9 kg (154 lb)   12/19/23 81.6 kg (180 lb)     Weight Change(s) Since Admission:   Wt Readings from Last 1 Encounters:   09/12/24 1443 76.4 kg (168 lb 6.9 oz)   09/12/24 0456 76.4 kg (168 lb 6.9 oz)   09/10/24 1817 78.9 kg (173 lb 15.1 oz)   Admit Weight: 78.9 kg (173 lb 15.1 oz) (09/10/24 1817), Weight Method: Bed Scale    Estimated Needs    Weight Used For Calorie Calculations: 76.4 kg (168 lb 6.9 oz)  Energy Calorie Requirements (kcal): 2780-3221 kcal (25-30 kcal/kg/CBW)  Energy Need Method: Kcal/kg  Weight Used For Protein Calculations: 76.4 kg (168 lb 6.9 oz)  Protein Requirements: " 76.56 gm (1g.kg)  Fluid Requirements (mL): 1910 mL (1 mL/kcal)        Enteral Nutrition     Patient not receiving enteral nutrition at this time.    Parenteral Nutrition     Patient not receiving parenteral nutrition support at this time.    Evaluation of Received Nutrient Intake    Calories: not meeting estimated needs  Protein: not meeting estimated needs    Patient Education     Not applicable.    Nutrition Diagnosis     PES: Inadequate oral intake related to acute illness as evidenced by 25-50% of meals. (new)         Nutrition Interventions     Intervention(s): general/healthful diet, modified composition of meals/snacks, and commercial beverage    Goal: Consume % of meals/snacks by follow-up. (new)  Goal: Maintain weight throughout hospitalization. (new)    Nutrition Goals & Monitoring     Dietitian will monitor: food and beverage intake, weight, weight change, electrolyte/renal panel, glucose/endocrine profile, and gastrointestinal profile  Discharge planning: too early to determine; pending clinical course  Nutrition Risk/Follow-Up: moderate (follow-up in 3-5 days)   Please consult if re-assessment needed sooner.

## 2024-09-12 NOTE — PT/OT/SLP EVAL
"Speech Language Pathology Evaluation  Bedside Swallow    Patient Name:  Baudilio Mantilla   MRN:  [id number]  Admitting Diagnosis: Acute on chronic systolic heart failure    Recommendations:                 General Recommendations:  Dysphagia therapy and Modified barium swallow study  Diet recommendations:   ,     Aspiration Precautions: Alternating bites/sips and HOB to 90 degrees   General Precautions: Standard,    Communication strategies:  provide increased time to answer    Assessment:     Baudilio Mantilla is a 89 y.o. female with an SLP diagnosis of Dysphagia.  She presents with coughing with thin liquids per nursing staff. Pt initially presented to [facility] on 9/4/24 with chronic HFrEf initially requiring supplemental O2. Pt reported she has been seen by Dr. Greene, ENT regarding her vocal quality. 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 Pt had MBSS at [facility] in 2023 with recommendations of NO straws, thin liquids, and regular solids. Possible thickened liquids and speech therapy prior top this stay, pt inconsistent with responses.     History:     No past medical history on file.    Past Surgical History:   Procedure Laterality Date    CATARACT EXTRACTION, BILATERAL      FOOT SURGERY      MASTECTOMY Right     TOTAL KNEE ARTHROPLASTY Bilateral        Social History: Patient lives with alone. Pt discussing going to a nursing home after discharge.     Prior Intubation HX:  N/A     Modified Barium Swallow: Yes. 9/12/24 scheduled     Chest X-Rays: revealed Cardiomegaly     Prior diet: regular solids and thin liquids .    Occupation/hobbies/homemaking: puzzles.    Subjective     Pt in good spirits and answering all questions.   Patient goals: eat and drink what I want      Pain/Comfort:   None reported     Respiratory Status: Room air    Objective:     Oral Musculature Evaluation       Bedside Swallow Eval:   Consistencies Assessed:  Thin liquids coughing observed on all trials with " wet VQ following.   Nectar thick liquids delayed coughing noted on 2/4 trials. SLP did attempt mildly thick liquid trial following a 30-minute window after breakfast with coughing noted on 2/2 trials.       Oral Phase:   Slow oral transit time    Pharyngeal Phase:   coughing/choking  decreased hyolaryngeal excursion to palpation  multiple spontaneous swallows    Compensatory Strategies  Multiple swallows    Treatment: MBSS recommended for further interventions. Spoke with FT SLP, nursing, and aid.     Goals:   Multidisciplinary Problems       SLP Goals       Not on file                    Plan:     Patient to be seen:  3-5x/week     Plan of Care expires:  10/4/24   Plan of Care reviewed with:   patient and nursing staff    SLP Follow-Up:  Yes         Discharge recommendations:  Safe swallow precautions   Barriers to Discharge:  Level of Skilled Assistance Needed      Time Tracking:     SLP Treatment Date:   09/12/2024   Speech Start Time:  8:00   Speech Stop Time:  8:15      Speech Total Time (min):  15     Billable Minutes: Eval Swallow and Oral Function 15    09/12/2024

## 2024-09-12 NOTE — PT/OT/SLP PROGRESS
Occupational Therapy  Treatment    Name: Baudilio Mantilla    : 1932 (89 y.o.)  MRN: [id number]           TREATMENT SUMMARY AND RECOMMENDATIONS:      OT Date of Treatment: 24  OT Start Time: 1020  OT Stop Time: 103  OT Total Time (min): 12 min      Subjective Assessment:   No complaints  Lethargic   X Awake, alert, cooperative  Impulsive    Uncooperative   Flat affect    Agitated  c/o pain    Appropriate  c/o fatigue    Confused X Treated at bedside     Emotionally labile  Treated in gym/dept.      Other:        Therapy Precautions:    Cognitive deficits  Spinal precautions    Collar - hard  Sternal precautions    Collar - soft   TLSO   X Fall risk  LSO    Hip precautions - posterior  Knee immobilizer    Hip precautions - anterior  WBAT    Impaired communication  Partial weightbearing    Oxygen  TTWB    PEG tube  NWB    Visual deficits      Hearing deficits   Other:        Treatment Objectives:     Mobility Training:    Mobility task Assist level Comments    Bed mobility     Transfer Min A x2 Stand step t/f c RW chair>recliner   Sit to stands transitions Min A x2 From chair level   Functional mobility     Sitting balance     Standing balance      Other:          Additional Comments:  OT assisting SLP c t/f from [facility] chair.     Assessment: Patient tolerated session well. Pt required max encouragement from OT and SLP to sit UIC upon return to room from [facility]. Pt would continue to benefit from skilled OT services to improve pt's safety and independence with daily occupations, decrease caregiver burden, and reduce pt's risk of falls.    GOALS:   Multidisciplinary Problems       Occupational Therapy Goals          Problem: Occupational Therapy    Goal Priority Disciplines Outcome Interventions   Occupational Therapy Goal     OT, PT/OT Progressing    Description: Goals to be met by: 10/11/2024     Patient will increase functional independence with ADLs by performing:    UE Dressing with Set-up Assistance.  KRIS  Dressing with Contact Guard Assistance c use of AE PRN.  Grooming while seated at sink with Set-up Assistance.  Toileting from toilet with Minimal Assistance for hygiene and clothing management.   Toilet transfer to toilet with Contact Guard Assistance.                         Recommendations:     Discharge Equipment Recommendations:  none     Plan:     Patient to be seen  (5-6x/week (QD-BID)) to address the above listed problems via self-care/home management, therapeutic activities, therapeutic exercises  Plan of Care Expires: 10/11/24  Plan of Care Reviewed with: patient  Revisions made to plan of care: No      Skilled OT Minutes Provided: 12  Communication with Treatment Team:     Equipment recommendations:       At end of treatment, patient remained:  X Up in chair     Up in wheelchair in room    In bed   X With alarm activated    Bed rails up   X Call bell in reach     Family/friends present    Restraints secured properly    In bathroom with CNA/RN notified    In gym with PT/PTA/tech    Nurse aware    Other:

## 2024-09-12 NOTE — PT/OT/SLP PROGRESS
Name: Baudilio Mantilla    : 1932 (89 y.o.)  MRN: [id number]           Patient Unavailable      Patient unable to be seen at this time secondary to: pt currently in xray to have MBS- will attempt later

## 2024-09-12 NOTE — PT/OT/SLP PROGRESS
Name: Baudilio Mantilla    : 1932 (89 y.o.)  MRN: [id number]           Patient Unavailable      Patient unable to be seen at this time secondary to: CNA returning pt to bed via kenan lift upon arrival- will resume tomorrow

## 2024-09-13 PROCEDURE — 11000004 HC SNF PRIVATE

## 2024-09-13 PROCEDURE — 97535 SELF CARE MNGMENT TRAINING: CPT

## 2024-09-13 PROCEDURE — 97530 THERAPEUTIC ACTIVITIES: CPT

## 2024-09-13 PROCEDURE — 97530 THERAPEUTIC ACTIVITIES: CPT | Mod: CQ

## 2024-09-13 PROCEDURE — 92526 ORAL FUNCTION THERAPY: CPT

## 2024-09-13 PROCEDURE — 25000003 PHARM REV CODE 250: Performed by: PHYSICIAN ASSISTANT

## 2024-09-13 PROCEDURE — 99900035 HC TECH TIME PER 15 MIN (STAT)

## 2024-09-13 PROCEDURE — 63600175 PHARM REV CODE 636 W HCPCS: Performed by: PHYSICIAN ASSISTANT

## 2024-09-13 RX ADMIN — MICONAZOLE NITRATE: 20 CREAM TOPICAL at 09:09

## 2024-09-13 RX ADMIN — SPIRONOLACTONE 25 MG: 25 TABLET ORAL at 09:09

## 2024-09-13 RX ADMIN — COLCHICINE 0.6 MG: 0.6 TABLET, FILM COATED ORAL at 09:09

## 2024-09-13 RX ADMIN — APIXABAN 2.5 MG: 2.5 TABLET, FILM COATED ORAL at 08:09

## 2024-09-13 RX ADMIN — METHIMAZOLE 2.5 MG: 5 TABLET ORAL at 09:09

## 2024-09-13 RX ADMIN — METOPROLOL SUCCINATE 12.5 MG: 25 TABLET, EXTENDED RELEASE ORAL at 09:09

## 2024-09-13 RX ADMIN — APIXABAN 2.5 MG: 2.5 TABLET, FILM COATED ORAL at 09:09

## 2024-09-13 RX ADMIN — BUMETANIDE 2 MG: 1 TABLET ORAL at 09:09

## 2024-09-13 RX ADMIN — PREDNISONE 20 MG: 20 TABLET ORAL at 09:09

## 2024-09-13 RX ADMIN — SERTRALINE HYDROCHLORIDE 50 MG: 50 TABLET ORAL at 09:09

## 2024-09-13 RX ADMIN — MICONAZOLE NITRATE: 20 CREAM TOPICAL at 08:09

## 2024-09-13 NOTE — PROGRESS NOTES
[facility]  [facility] MEDICINE ~ PROGRESS NOTE    CHIEF COMPLAINT     Hospital follow up for debility and acute on chronic HFrEF    HOSPITAL COURSE     89-year-old female with a past medical history of CHF, AFib, HTN, HLD a CKD, hypothyroidism, gout, arthritis, vitamin-D deficiency who presented to [facility] on 09/04/2024 acute on chronic HFrEF.  BNP was elevated on arrival CXR showed cardiomegaly.  She initially required supplemental oxygen.  Echo obtained showed EF 40-45% and diastolic dysfunction.  Patient was evaluated by Cardiology who initially had patient on IV Lasix and switch to home Bumex and spironolactone.  Eliquis was decreased to 2.5 mg b.i.d. due to age and renal function.  Patient was also initiated on metoprolol succinate 2.5 mg daily.  Patient continued to complain arthritic pain.  Patient was initiated on colchicine on 09/06/2024.  She was discharged on prednisone 20 mg daily for 3 days.  Patient was transferred to our [facility] for continued therapy.  Labs yesterday revealed BUN 90, creatinine around baseline.  Repeat labs today showed BUN improved to 80 with creatinine also improved.  Patient continues to complain of arthritic pain, adding lidocaine patches as needed.  Patient will be receiving therapy evaluations today.     Today:  SLP changed diet to minced and moist with mildly thick liquids.  Continue to work with SLP for possible advancement of diet. Gluteal wound culture showing gram positive cocci - suspect superficial swab was obtained as no drainage from wound.      OBJECTIVE/PHYSICAL EXAM     VITAL SIGNS (MOST RECENT):  Temp: 97.7 °F (36.5 °C) (09/13/24 0727)  Pulse: 65 (09/13/24 0727)  Resp: 18 (09/13/24 0311)  BP: 107/66 (09/13/24 0924)  SpO2: (!) 93 % (09/13/24 0727) VITAL SIGNS (24 HOUR RANGE):  Temp:  [97.7 °F (36.5 °C)-98.2 °F (36.8 °C)] 97.7 °F (36.5 °C)  Pulse:  [60-72] 65  Resp:  [18] 18  SpO2:  [93 %-95 %] 93 %  BP: (107-146)/(66-85) 107/66  "    GENERAL: In no acute distress, afebrile  HEENT: Atraumatic, normocephalic, moist mucous membranes   CHEST: Clear to auscultation bilaterally  HEART: S1, S2, no appreciable murmur  ABDOMEN: Soft, nontender, BS +  MSK: Warm, no lower extremity edema, no clubbing or cyanosis  NEUROLOGIC: Alert and oriented x4, moving all extremities with good strength   INTEGUMENTARY: See media for gluteal and vaginal areas, no active drainage from gluteal wound     ASSESSMENT/PLAN     Acute on chronic HFrEF  Echo EF 40-45% and diastolic dysfunction  Continue home Bumex 2 mg daily, spironolactone 25 mg daily  Daily weights, strict I's and O's     Afib  HTN  Continue metoprolol succinate 12.5 mg daily  Continue Eliquis 2.5 mg b.i.d., decreased at previous facility due to age and renal function     CKD  Monitor renal function closely with Bumex 2 mg daily and spironolactone 25 mg daily  Renal function improved today from day prior  Monitor urine output     Gout, arthritis   Started on colchicine 0.6 mg daily on 09/06/2024  Continue prednisone 20 mg daily x3 days starting 09/11/2024  Left wrist x-ray 09/06/2024 showed inflammatory arthritis with degenerative arthritis at the triscaphe join  Lidocaine patch/Voltaren gel as needed    Vaginal lesions  Gluteal wound  Gluteal wound culture showing gram positive cocci - suspect superficial swab was obtained as no drainage from wound  Defer antibiotics      Hx of HLD, hypothyroidism, vitamin-D deficiency, depression      DVT prophylaxis:  Eliquis at decreased dose of 2.5 mg b.i.d.    Anticipated discharge and disposition: Continue PT/OT  __________________________________________________________________________    LABS/MICRO/MEDS/DIAGNOSTICS     LABS  Recent Labs     09/11/24  0911      K 4.6   CO2 27   BUN 80.8*   CREATININE 1.47*   GLUCOSE 140*   CALCIUM 10.2     No results for input(s): "WBC", "RBC", "HCT", "MCV", "PLT" in the last 72 hours.    Invalid input(s): " ""HG"    MICROBIOLOGY  Microbiology Results (last 7 days)       Procedure Component Value Units Date/Time    Wound Culture [[id number]]  (Abnormal) Collected: 09/11/24 1533    Order Status: Completed Specimen: Drainage Updated: 09/13/24 0655     Wound Culture Many Gram-positive Cocci             MEDICATIONS   apixaban  2.5 mg Oral BID    bumetanide  2 mg Oral Daily    colchicine  0.6 mg Oral Daily    methIMAzole  2.5 mg Oral Daily    metoprolol succinate  12.5 mg Oral Daily    miconazole   Topical (Top) BID    sertraline  50 mg Oral Daily    spironolactone  25 mg Oral Daily         INFUSIONS       DIAGNOSTIC TESTS  Fl Modified Barium Swallow Speech   Final Result         No results found for: "EF"     NUTRITION STATUS  Patient meets ASPEN criteria for mild malnutrition of acute illness or injury per RD assessment as evidenced by:  Energy Intake (Malnutrition): less than 75% for greater than or equal to 1 month  Weight Loss (Malnutrition): other (see comments) (wt loss unsure)  Subcutaneous Fat (Malnutrition): other (see comments) (doesn't meet criteria)  Muscle Mass (Malnutrition): other (see comments) (doesn't meet criteria)  Fluid Accumulation (Malnutrition): other (see comments) (doesn't meet criteria)  Hand  Strength, Left (Malnutrition): unable to evaluate  Hand  Strength, Right (Malnutrition): unable to evaluate  A minimum of two characteristics is recommended for diagnosis of either severe or non-severe malnutrition.     Case related differential diagnoses have been reviewed; assessment and plan has been documented. I have personally reviewed the labs and test results that are currently available; I have reviewed the patients medication list. I have reviewed the consulting providers recommendations. I have reviewed or attempted to review medical records based upon their availability.  All of the patient's and/or family's questions have been addressed and answered to the best of my ability.  I will " continue to monitor closely and make adjustments to medical management as needed.  This document was created using M*Modal Fluency Direct.  Transcription errors may have been made.  Please contact me if any questions may rise regarding documentation to clarify transcription.      RACHEL Awan   Internal Medicine  Department of Hospital Medicine [facility]

## 2024-09-13 NOTE — PLAN OF CARE
Problem: Adult Inpatient Plan of Care  Goal: Plan of Care Review  Outcome: Progressing  Flowsheets (Taken 9/12/2024 1921)  Plan of Care Reviewed With: patient  Goal: Patient-Specific Goal (Individualized)  Outcome: Progressing  Flowsheets (Taken 9/12/2024 1921)  Individualized Care Needs: Left wrist pain management, 1:1 feed, Daily weight, Therapy, Aspirations precautions  Anxieties, Fears or Concerns: Doesn't want pills crushed  Goal: Absence of Hospital-Acquired Illness or Injury  Outcome: Progressing  Intervention: Identify and Manage Fall Risk  Flowsheets (Taken 9/12/2024 1921)  Safety Promotion/Fall Prevention:   assistive device/personal item within reach   bed alarm set   lighting adjusted   medications reviewed   muscle strengthening facilitated   nonskid shoes/socks when out of bed   room near unit station  Intervention: Prevent Skin Injury  Flowsheets (Taken 9/12/2024 1921)  Body Position: sitting up in bed  Skin Protection:   incontinence pads utilized   skin sealant/moisture barrier applied   transparent dressing maintained  Device Skin Pressure Protection:   absorbent pad utilized/changed   positioning supports utilized   tubing/devices free from skin contact  Intervention: Prevent and Manage VTE (Venous Thromboembolism) Risk  Flowsheets (Taken 9/12/2024 1921)  VTE Prevention/Management:   bleeding risk assessed   bleeding precautions maintained   ROM (active) performed   fluids promoted  Intervention: Prevent Infection  Flowsheets (Taken 9/12/2024 1921)  Infection Prevention:   cohorting utilized   hand hygiene promoted   single patient room provided  Goal: Optimal Comfort and Wellbeing  Outcome: Progressing  Intervention: Monitor Pain and Promote Comfort  Flowsheets (Taken 9/12/2024 1921)  Pain Management Interventions:   diversional activity provided   pillow support provided   position adjusted   quiet environment facilitated  Intervention: Provide Person-Centered Care  Flowsheets (Taken 9/12/2024  1921)  Trust Relationship/Rapport:   care explained   choices provided   questions answered   thoughts/feelings acknowledged     Problem: Infection  Goal: Absence of Infection Signs and Symptoms  Outcome: Progressing  Intervention: Prevent or Manage Infection  Flowsheets (Taken 9/12/2024 1921)  Fever Reduction/Comfort Measures:   lightweight bedding   lightweight clothing  Infection Management: aseptic technique maintained  Isolation Precautions:   protective   precautions maintained     Problem: Wound  Goal: Optimal Coping  Outcome: Progressing  Intervention: Support Patient and Family Response  Flowsheets (Taken 9/12/2024 1921)  Supportive Measures:   active listening utilized   goal-setting facilitated   self-care encouraged  Family/Support System Care: self-care encouraged  Goal: Optimal Functional Ability  Outcome: Progressing  Intervention: Optimize Functional Ability  Flowsheets (Taken 9/12/2024 1921)  Assistive Device Utilized:   gait belt   walker  Activity Management: Rolling - L1  Activity Assistance Provided: assistance, 2 people  Goal: Absence of Infection Signs and Symptoms  Outcome: Progressing  Intervention: Prevent or Manage Infection  Flowsheets (Taken 9/12/2024 1921)  Fever Reduction/Comfort Measures:   lightweight bedding   lightweight clothing  Infection Management: aseptic technique maintained  Isolation Precautions:   protective   precautions maintained  Goal: Improved Oral Intake  Outcome: Progressing  Intervention: Promote and Optimize Oral Intake  Flowsheets (Taken 9/12/2024 1921)  Oral Nutrition Promotion:   adaptive equipment use encouraged   calorie-dense foods provided   calorie-dense liquids provided   social interaction promoted   rest periods promoted  Goal: Optimal Pain Control and Function  Outcome: Progressing  Intervention: Prevent or Manage Pain  Flowsheets (Taken 9/12/2024 1921)  Sleep/Rest Enhancement:   consistent schedule promoted   regular sleep/rest pattern promoted    relaxation techniques promoted  Complementary Therapy: acupuncture performed  Pain Management Interventions:   diversional activity provided   pillow support provided   position adjusted   quiet environment facilitated  Goal: Skin Health and Integrity  Outcome: Progressing  Intervention: Optimize Skin Protection  Flowsheets (Taken 9/12/2024 1921)  Pressure Reduction Techniques:   frequent weight shift encouraged   rest period provided between sit times   weight shift assistance provided  Pressure Reduction Devices: positioning supports utilized  Skin Protection:   incontinence pads utilized   skin sealant/moisture barrier applied   transparent dressing maintained  Activity Management: Rolling - L1  Head of Bed (HOB) Positioning: HOB at 30-45 degrees  Goal: Optimal Wound Healing  Outcome: Progressing  Intervention: Promote Wound Healing  Flowsheets (Taken 9/12/2024 1921)  Sleep/Rest Enhancement:   consistent schedule promoted   regular sleep/rest pattern promoted   relaxation techniques promoted     Problem: Skin Injury Risk Increased  Goal: Skin Health and Integrity  Outcome: Progressing  Intervention: Optimize Skin Protection  Flowsheets (Taken 9/12/2024 1921)  Pressure Reduction Techniques:   frequent weight shift encouraged   rest period provided between sit times   weight shift assistance provided  Pressure Reduction Devices: positioning supports utilized  Skin Protection:   incontinence pads utilized   skin sealant/moisture barrier applied   transparent dressing maintained  Activity Management: Rolling - L1  Head of Bed (HOB) Positioning: HOB at 30-45 degrees  Intervention: Promote and Optimize Oral Intake  Flowsheets (Taken 9/12/2024 1921)  Oral Nutrition Promotion:   adaptive equipment use encouraged   calorie-dense foods provided   calorie-dense liquids provided   social interaction promoted   rest periods promoted     Problem: Heart Failure  Goal: Optimal Cardiac Output  Outcome: Progressing  Intervention:  Optimize Cardiac Output  Flowsheets (Taken 9/12/2024 1921)  Stabilization Measures: legs elevated  Environmental Support: calm environment promoted  Goal: Stable Heart Rate and Rhythm  Outcome: Progressing  Goal: Fluid and Electrolyte Balance  Outcome: Progressing  Intervention: Monitor and Manage Fluid and Electrolyte Balance  Flowsheets (Taken 9/12/2024 1921)  Fluid/Electrolyte Management: fluids provided     Problem: Fall Injury Risk  Goal: Absence of Fall and Fall-Related Injury  Outcome: Progressing  Intervention: Identify and Manage Contributors  Flowsheets (Taken 9/12/2024 1921)  Self-Care Promotion:   BADL personal objects within reach   BADL personal routines maintained   adaptive equipment use encouraged  Medication Review/Management: medications reviewed  Intervention: Promote Injury-Free Environment  Flowsheets (Taken 9/12/2024 1921)  Safety Promotion/Fall Prevention:   assistive device/personal item within reach   bed alarm set   lighting adjusted   medications reviewed   muscle strengthening facilitated   nonskid shoes/socks when out of bed   room near unit station

## 2024-09-13 NOTE — PT/OT/SLP PROGRESS
Name: Baudilio Mantilla    : 1932 (89 y.o.)  MRN: [id number]           Patient Unavailable      Patient unable to be seen at this time secondary to: Pt refused tx this AM requesting to rest. OT plans to f/u later this PM.

## 2024-09-13 NOTE — PT/OT/SLP PROGRESS
Name: Baudilio Mantilla    : 1932 (89 y.o.)  MRN: [id number]           Patient Unavailable      Patient unable to be seen at this time secondary to: Pt refused tx this PM. OT plans to f/u tomorrow to continue OT POC.

## 2024-09-13 NOTE — PT/OT/SLP PROGRESS
Occupational Therapy  Treatment    Name: Baudilio Mantilla    : 1932 (89 y.o.)  MRN: [id number]           TREATMENT SUMMARY AND RECOMMENDATIONS:      OT Date of Treatment: 24  OT Start Time: 1332  OT Stop Time: 1401  OT Total Time (min): 29 min      Subjective Assessment:   No complaints  Lethargic   X Awake, alert, cooperative  Impulsive    Uncooperative   Flat affect    Agitated  c/o pain    Appropriate  c/o fatigue    Confused X Treated at bedside     Emotionally labile  Treated in gym/dept.      Other:        Therapy Precautions:    Cognitive deficits  Spinal precautions    Collar - hard  Sternal precautions    Collar - soft   TLSO   X Fall risk  LSO    Hip precautions - posterior  Knee immobilizer    Hip precautions - anterior  WBAT    Impaired communication  Partial weightbearing    Oxygen  TTWB    PEG tube  NWB    Visual deficits      Hearing deficits   Other:        Treatment Objectives:     Mobility Training:    Mobility task Assist level Comments    Bed mobility Min A Semi supine>EOB   Transfer     Sit to stands transitions CGA From EOB level   Functional mobility Min A Pt ambulated short distances in room. Min A for RW mgmt and balance.    Sitting balance     Standing balance      Other:        ADL Training:    ADL Assist level Comments    Feeding Setup- CGA Pt brought cup to mouth c BUE to drink mildly thick liquids. Intermittent assistance to prevent spillage and maintain grasp.   Grooming/hygiene     Bathing     Upper body dressing     Lower body dressing     Toileting Max A (+) BM; Assistance needed for managing brief up/down and hygiene mgmt   Toilet transfer Min A Stand step t/f c RW EOB>BSC>recliner   Adaptive equipment training     Other:         Additional Comments:  Pt required encouragement from OT and pt's granddaughter for participation and to sit UIC.    Assessment: Patient tolerated session well. Pt demonstrates improvements in fxnl t/fs and FM this session. Pt would continue to  benefit from skilled OT services to improve pt's safety and independence with daily occupations, decrease caregiver burden, and reduce pt's risk of falls.      GOALS:   Multidisciplinary Problems       Occupational Therapy Goals          Problem: Occupational Therapy    Goal Priority Disciplines Outcome Interventions   Occupational Therapy Goal     OT, PT/OT Progressing    Description: Goals to be met by: 10/11/2024     Patient will increase functional independence with ADLs by performing:    UE Dressing with Set-up Assistance.  LE Dressing with Contact Guard Assistance c use of AE PRN.  Grooming while seated at sink with Set-up Assistance.  Toileting from toilet with Minimal Assistance for hygiene and clothing management.   Toilet transfer to toilet with Contact Guard Assistance.                         Recommendations:     Discharge Equipment Recommendations:  none     Plan:     Patient to be seen  (5-6x/week (QD-BID)) to address the above listed problems via self-care/home management, therapeutic activities, therapeutic exercises  Plan of Care Expires: 10/11/24  Plan of Care Reviewed with: patient  Revisions made to plan of care: No      Skilled OT Minutes Provided: 29  Communication with Treatment Team:     Equipment recommendations:       At end of treatment, patient remained:  X Up in chair     Up in wheelchair in room    In bed    With alarm activated    Bed rails up   X Call bell in reach    X Family/friends present    Restraints secured properly    In bathroom with CNA/RN notified    In gym with PT/PTA/tech    Nurse aware    Other:

## 2024-09-13 NOTE — PT/OT/SLP PROGRESS
Physical Therapy Treatment Note           Name: Baudilio Mantilla    : 1932 (89 y.o.)  MRN: [id number]           TREATMENT SUMMARY AND RECOMMENDATIONS:    PT Received On: 24  PT Start Time: 1445     PT Stop Time: 1500  PT Total Time (min): 15 min     Subjective Assessment:   No complaints  Lethargic    Awake, alert, cooperative  Uncooperative    Agitated  c/o pain    Appropriate  c/o fatigue    Confused x Treated at bedside     Emotionally labile  Treated in gym/dept.    Impulsive  Other:    Flat affect       Therapy Precautions:    Cognitive deficits  Spinal precautions    Collar - hard  Sternal precautions    Collar - soft   TLSO    Fall risk  LSO    Hip precautions - posterior  Knee immobilizer    Hip precautions - anterior  WBAT    Impaired communication  Partial weightbearing    Oxygen  TTWB    PEG tube  NWB    Visual deficits  Other:    Hearing deficits          Treatment Objectives:     Mobility Training:   Assist level Comments    Bed mobility Sky Sit-supine   Transfer     Gait Sky Amb on firm surface with RW 15 ft    Sit to stand transitions     Sitting balance     Standing balance      Wheelchair mobility     Car transfer     Other:          Therapeutic Exercise:   Exercise Sets Reps Comments                                 PT Plan: no issues noted  Revisions made to plan of care: No    GOALS:   Multidisciplinary Problems       Physical Therapy Goals          Problem: Physical Therapy    Goal Priority Disciplines Outcome Goal Variances Interventions   Physical Therapy Goal     PT, PT/OT Progressing     Description: Goals to be met by: 24     Patient will increase functional independence with mobility by performin. Supine to sit with Contact Guard Assistance  2. Sit to supine with Contact Guard Assistance  3. Rolling to Left and Right with Stand-by Assistance.  4. Sit to stand transfer with Contact Guard Assistance  5. Bed to chair transfer with Contact Guard Assistance using  Rolling Walker  6. Gait  x 25 feet with Contact Guard Assistance using Rolling Walker.                          Skilled PT Minutes Provided: 15   Communication with Treatment Team:     Equipment recommendations:       At end of treatment, patient remained:   Up in chair     Up in wheelchair in room   x In bed   x With alarm activated   x Bed rails up   x Call bell in reach     Family/friends present    Restraints secured properly    In bathroom with CNA/RN notified    Nurse aware    In gym with therapist/tech    Other:

## 2024-09-13 NOTE — PLAN OF CARE
Problem: Adult Inpatient Plan of Care  Goal: Plan of Care Review  Outcome: Progressing  Flowsheets (Taken 9/13/2024 1400)  Plan of Care Reviewed With: patient  Goal: Patient-Specific Goal (Individualized)  Outcome: Progressing  Flowsheets (Taken 9/13/2024 1400)  Individualized Care Needs: pain management, assistance feed, daily weight, aspiration precations  Anxieties, Fears or Concerns: none expressed  Goal: Absence of Hospital-Acquired Illness or Injury  Outcome: Progressing  Intervention: Identify and Manage Fall Risk  Flowsheets (Taken 9/13/2024 1400)  Safety Promotion/Fall Prevention:   assistive device/personal item within reach   bed alarm set   nonskid shoes/socks when out of bed   side rails raised x 2  Intervention: Prevent Skin Injury  Flowsheets (Taken 9/13/2024 1400)  Body Position: supine  Skin Protection:   incontinence pads utilized   skin sealant/moisture barrier applied  Device Skin Pressure Protection: absorbent pad utilized/changed  Goal: Optimal Comfort and Wellbeing  Outcome: Progressing  Intervention: Monitor Pain and Promote Comfort  Flowsheets (Taken 9/13/2024 1400)  Pain Management Interventions: pain management plan reviewed with patient/caregiver  Goal: Readiness for Transition of Care  Outcome: Progressing     Problem: Infection  Goal: Absence of Infection Signs and Symptoms  Outcome: Progressing  Intervention: Prevent or Manage Infection  Flowsheets (Taken 9/13/2024 1400)  Infection Management: aseptic technique maintained  Isolation Precautions: protective     Problem: Wound  Goal: Optimal Coping  Outcome: Progressing  Intervention: Support Patient and Family Response  Flowsheets (Taken 9/13/2024 1400)  Supportive Measures: active listening utilized  Family/Support System Care: self-care encouraged  Goal: Optimal Functional Ability  Outcome: Progressing  Goal: Absence of Infection Signs and Symptoms  Outcome: Progressing  Intervention: Prevent or Manage Infection  Flowsheets (Taken  9/13/2024 1400)  Infection Management: aseptic technique maintained  Isolation Precautions: protective  Goal: Improved Oral Intake  Outcome: Progressing  Intervention: Promote and Optimize Oral Intake  Flowsheets (Taken 9/13/2024 1400)  Oral Nutrition Promotion: adaptive equipment use encouraged  Goal: Optimal Pain Control and Function  Outcome: Progressing  Intervention: Prevent or Manage Pain  Flowsheets (Taken 9/13/2024 1400)  Sleep/Rest Enhancement: awakenings minimized  Pain Management Interventions: pain management plan reviewed with patient/caregiver  Goal: Skin Health and Integrity  Outcome: Progressing  Goal: Optimal Wound Healing  Outcome: Progressing     Problem: Skin Injury Risk Increased  Goal: Skin Health and Integrity  Outcome: Progressing  Intervention: Optimize Skin Protection  Flowsheets (Taken 9/13/2024 1400)  Pressure Reduction Techniques: frequent weight shift encouraged  Skin Protection:   incontinence pads utilized   skin sealant/moisture barrier applied  Activity Management: Rolling - L1  Head of Bed (HOB) Positioning: HOB at 20-30 degrees     Problem: Heart Failure  Goal: Optimal Cardiac Output  Outcome: Progressing  Intervention: Optimize Cardiac Output  Flowsheets (Taken 9/13/2024 1400)  Stabilization Measures: airway opened  Environmental Support: calm environment promoted  Goal: Stable Heart Rate and Rhythm  Outcome: Progressing  Goal: Fluid and Electrolyte Balance  Outcome: Progressing     Problem: Fall Injury Risk  Goal: Absence of Fall and Fall-Related Injury  Outcome: Progressing  Intervention: Identify and Manage Contributors  Flowsheets (Taken 9/13/2024 1400)  Self-Care Promotion:   independence encouraged   adaptive equipment use encouraged   BADL personal objects within reach  Medication Review/Management: medications reviewed  Intervention: Promote Injury-Free Environment  Flowsheets (Taken 9/13/2024 1400)  Safety Promotion/Fall Prevention:   assistive device/personal item  within reach   bed alarm set   nonskid shoes/socks when out of bed   side rails raised x 2

## 2024-09-13 NOTE — PT/OT/SLP PROGRESS
"Speech Language Pathology Treatment    Patient Name:  Baudilio Mantilla   MRN:  [id number]  Admitting Diagnosis: Acute on chronic systolic heart failure    Recommendations:                 General Recommendations:  Dysphagia therapy  Diet recommendations:  Minced and Moist , Mildly Thick Liquids     Aspiration Precautions: Chin tuck, HOB to 90 degrees, Monitor for s/s of aspiration, Remain upright 30 minutes post meal, and Strict aspiration precautions   General Precautions: Standard,    Communication strategies:  none    Assessment:     Baudilio Mantilla is a 89 y.o. female with an SLP diagnosis of Dysphagia.  She presents with decreased diet tolerance of minced and moist and mildly thick liquids. Pt with consistent coughing at this time reported by nursing.    Subjective     Pt appears down today stating "I am tired".  Patient goals: Eat what I want      Pain/Comfort:   None reported     Respiratory Status: Room air    Objective:     Has the patient been evaluated by SLP for swallowing?   Yes    Keep patient NPO?     Current Respiratory Status: Spoke with nursing regarding respiratory status with reports of no significant concerns.        Pt refusing PO intake at this time. Pt stating that she does not like the current diet and does not like the thickened liquids. Thickened liquids with coughing on all trials. SLP spoke with nursing and Dr. Reyes regarding current intake and concerns of continued aspiration with modification and strategies. SLP questioned alternate means vs. Comfort care at this time. SLP did speak with pt following this encounter with reports that she is "just tired, I am tired". Pt undecided on aggressive therapy versus comfort care at this time. SLP will continue to provide URIEL and PSE to improve swallow function. SLP did educate pt on the risk of aspiration due to VF closure and prior dx.     Goals:   Multidisciplinary Problems       SLP Goals          Problem: SLP    Goal Priority Disciplines Outcome "   SLP Goal     SLP Progressing   Description: Short Term   1. Pt will complete URIEL and PSE with SUP cues and improved swallow function on 80% trials.   2. Pt will complete mildly thick liquid trials without chin tuck on 5/5 trials with no overt signs/sx aspiration or penetration.   3 Pt will utilize swallow strategies with SUP level on 5/5 trials.     Long Term   1. Pt will tolerate least restrictive diet with no overt signs/sx aspiration or penetration.                        Plan:     Patient to be seen:  5x/week     Plan of Care expires:  10/04/24   Plan of Care reviewed with:   patient and family    SLP Follow-Up:  Yes         Discharge recommendations:  Strict aspiration precautions     Barriers to Discharge:   poor swallow function and high aspiration risk.     Time Tracking:     SLP Treatment Date:   09/13/24   Speech Start Time:  12:00   Speech Stop Time:  12:20      Speech Total Time (min):  20     Billable Minutes: Treatment Swallowing Dysfunction 20 09/13/2024

## 2024-09-14 PROCEDURE — 94760 N-INVAS EAR/PLS OXIMETRY 1: CPT

## 2024-09-14 PROCEDURE — 99900035 HC TECH TIME PER 15 MIN (STAT)

## 2024-09-14 PROCEDURE — 25000003 PHARM REV CODE 250: Performed by: PHYSICIAN ASSISTANT

## 2024-09-14 PROCEDURE — 11000004 HC SNF PRIVATE

## 2024-09-14 RX ADMIN — APIXABAN 2.5 MG: 2.5 TABLET, FILM COATED ORAL at 08:09

## 2024-09-14 RX ADMIN — SERTRALINE HYDROCHLORIDE 50 MG: 50 TABLET ORAL at 08:09

## 2024-09-14 RX ADMIN — MICONAZOLE NITRATE: 20 CREAM TOPICAL at 09:09

## 2024-09-14 RX ADMIN — DICLOFENAC SODIUM 2 G: 10 GEL TOPICAL at 08:09

## 2024-09-14 RX ADMIN — METOPROLOL SUCCINATE 12.5 MG: 25 TABLET, EXTENDED RELEASE ORAL at 08:09

## 2024-09-14 RX ADMIN — METHIMAZOLE 2.5 MG: 5 TABLET ORAL at 08:09

## 2024-09-14 RX ADMIN — ACETAMINOPHEN 650 MG: 325 TABLET ORAL at 02:09

## 2024-09-14 RX ADMIN — SPIRONOLACTONE 25 MG: 25 TABLET ORAL at 08:09

## 2024-09-14 RX ADMIN — BUMETANIDE 2 MG: 1 TABLET ORAL at 08:09

## 2024-09-14 RX ADMIN — COLCHICINE 0.6 MG: 0.6 TABLET, FILM COATED ORAL at 08:09

## 2024-09-14 RX ADMIN — MICONAZOLE NITRATE: 20 CREAM TOPICAL at 08:09

## 2024-09-14 NOTE — PLAN OF CARE
Problem: Adult Inpatient Plan of Care  Goal: Patient-Specific Goal (Individualized)  Outcome: Progressing  Flowsheets (Taken 9/13/2024 1945)  Individualized Care Needs: monitor respiratory status with food and drink, wound care  Anxieties, Fears or Concerns: None expressed  Patient/Family-Specific Goals (Include Timeframe): Return to baseline by discharge     Problem: Wound  Goal: Skin Health and Integrity  Outcome: Progressing

## 2024-09-14 NOTE — PLAN OF CARE
Problem: Adult Inpatient Plan of Care  Goal: Plan of Care Review  Outcome: Progressing  Flowsheets (Taken 9/14/2024 1119)  Plan of Care Reviewed With: patient  Goal: Patient-Specific Goal (Individualized)  Outcome: Progressing  Flowsheets (Taken 9/14/2024 1119)  Individualized Care Needs: wound care, encourage pt/ot, assistant feed, aspiration precautions  Anxieties, Fears or Concerns: none expressed  Goal: Absence of Hospital-Acquired Illness or Injury  Outcome: Progressing  Intervention: Identify and Manage Fall Risk  Flowsheets (Taken 9/14/2024 1119)  Safety Promotion/Fall Prevention:   assistive device/personal item within reach   bed alarm set   instructed to call staff for mobility   nonskid shoes/socks when out of bed  Intervention: Prevent Skin Injury  Flowsheets (Taken 9/14/2024 1119)  Body Position: supine  Skin Protection: incontinence pads utilized  Device Skin Pressure Protection: absorbent pad utilized/changed  Goal: Optimal Comfort and Wellbeing  Outcome: Progressing  Goal: Readiness for Transition of Care  Outcome: Progressing     Problem: Infection  Goal: Absence of Infection Signs and Symptoms  Outcome: Progressing  Intervention: Prevent or Manage Infection  Flowsheets (Taken 9/14/2024 1119)  Infection Management: aseptic technique maintained  Isolation Precautions: protective     Problem: Wound  Goal: Optimal Coping  Outcome: Progressing  Goal: Optimal Functional Ability  Outcome: Progressing  Intervention: Optimize Functional Ability  Flowsheets (Taken 9/14/2024 1119)  Assistive Device Utilized:   gait belt   walker  Activity Management: Rolling - L1  Activity Assistance Provided: assistance, 1 person  Goal: Absence of Infection Signs and Symptoms  Outcome: Progressing  Goal: Improved Oral Intake  Outcome: Progressing  Intervention: Promote and Optimize Oral Intake  Flowsheets (Taken 9/14/2024 1119)  Oral Nutrition Promotion: adaptive equipment use encouraged  Goal: Optimal Pain Control and  Function  Outcome: Progressing  Goal: Skin Health and Integrity  Outcome: Progressing  Intervention: Optimize Skin Protection  Flowsheets (Taken 9/14/2024 1119)  Pressure Reduction Techniques: frequent weight shift encouraged  Skin Protection: incontinence pads utilized  Activity Management: Rolling - L1  Head of Bed (HOB) Positioning: HOB at 30 degrees  Goal: Optimal Wound Healing  Outcome: Progressing  Intervention: Promote Wound Healing  Flowsheets (Taken 9/14/2024 1119)  Sleep/Rest Enhancement: awakenings minimized     Problem: Skin Injury Risk Increased  Goal: Skin Health and Integrity  Outcome: Progressing  Intervention: Optimize Skin Protection  Flowsheets (Taken 9/14/2024 1119)  Pressure Reduction Techniques: frequent weight shift encouraged  Skin Protection: incontinence pads utilized  Activity Management: Rolling - L1  Head of Bed (HOB) Positioning: HOB at 30 degrees     Problem: Heart Failure  Goal: Optimal Cardiac Output  Outcome: Progressing  Intervention: Optimize Cardiac Output  Flowsheets (Taken 9/14/2024 1119)  Stabilization Measures: airway opened  Environmental Support:   calm environment promoted   environmental consistency promoted   caregiver consistency promoted  Goal: Stable Heart Rate and Rhythm  Outcome: Progressing  Goal: Fluid and Electrolyte Balance  Outcome: Progressing  Intervention: Monitor and Manage Fluid and Electrolyte Balance  Flowsheets (Taken 9/14/2024 1119)  Fluid/Electrolyte Management: fluids provided     Problem: Fall Injury Risk  Goal: Absence of Fall and Fall-Related Injury  Outcome: Progressing  Intervention: Identify and Manage Contributors  Flowsheets (Taken 9/14/2024 1119)  Self-Care Promotion:   independence encouraged   adaptive equipment use encouraged   BADL personal objects within reach  Medication Review/Management: medications reviewed  Intervention: Promote Injury-Free Environment  Flowsheets (Taken 9/14/2024 1119)  Safety Promotion/Fall Prevention:    assistive device/personal item within reach   bed alarm set   instructed to call staff for mobility   nonskid shoes/socks when out of bed

## 2024-09-14 NOTE — PT/OT/SLP PROGRESS
"Name: Baudilio Mantilla    : 1932 (89 y.o.)  MRN: [id number]           Patient Unavailable      Patient unable to be seen at this time secondary to: Pt refused tx this AM despite OT encouragement stating she was "tired" and "too old". OT plans to f/u Monday to continue OT POC.     "

## 2024-09-14 NOTE — PROGRESS NOTES
[facility]  [facility] MEDICINE ~ PROGRESS NOTE    CHIEF COMPLAINT   Hospital follow up for debility and acute on chronic HFrEF    HOSPITAL COURSE   89-year-old female with a past medical history of CHF, AFib, HTN, HLD a CKD, hypothyroidism, gout, arthritis, vitamin-D deficiency who presented to [facility] on 09/04/2024 acute on chronic HFrEF.  BNP was elevated on arrival CXR showed cardiomegaly.  She initially required supplemental oxygen.  Echo obtained showed EF 40-45% and diastolic dysfunction.  Patient was evaluated by Cardiology who initially had patient on IV Lasix and switch to home Bumex and spironolactone.  Eliquis was decreased to 2.5 mg b.i.d. due to age and renal function.  Patient was also initiated on metoprolol succinate 2.5 mg daily.  Patient continued to complain arthritic pain.  Patient was initiated on colchicine on 09/06/2024.  She was discharged on prednisone 20 mg daily for 3 days.  Patient was transferred to our [facility] for continued therapy.  Labs yesterday revealed BUN 90, creatinine around baseline.  Repeat labs today showed BUN improved to 80 with creatinine also improved.  Patient continues to complain of arthritic pain, adding lidocaine patches as needed.  Patient will be receiving therapy evaluations today.     Today:  Patient denies any complaints, she was more awake today.  Once again discussed PEG tube placement and she emphatically refuses.  I did explain to her the aspiration risk, she was willing to accept this.  We also discussed hospice level care as it appears patient wants comfort measures only at this time.  Patient requested hospice evaluation.    OBJECTIVE/PHYSICAL EXAM     VITAL SIGNS (MOST RECENT):  Temp: 98.2 °F (36.8 °C) (09/14/24 1114)  Pulse: 64 (09/14/24 1114)  Resp: 18 (09/14/24 0701)  BP: 116/68 (09/14/24 1114)  SpO2: (!) 94 % (09/14/24 1114) VITAL SIGNS (24 HOUR RANGE):  Temp:  [97.5 °F (36.4 °C)-98.2 °F (36.8 °C)] 98.2 °F (36.8  "°C)  Pulse:  [60-70] 64  Resp:  [18-20] 18  SpO2:  [92 %-98 %] 94 %  BP: (101-124)/(62-77) 116/68   GENERAL: In no acute distress, afebrile  HEENT: Atraumatic, normocephalic, moist mucous membranes   CHEST: Clear to auscultation bilaterally  HEART: S1, S2, no appreciable murmur  ABDOMEN: Soft, nontender, BS +  MSK: Warm, no lower extremity edema, no clubbing or cyanosis  NEUROLOGIC: Alert and oriented x4, moving all extremities with good strength   INTEGUMENTARY: See media for gluteal and vaginal areas, no active drainage from gluteal wound     ASSESSMENT/PLAN   Acute on chronic HFrEF  Echo EF 40-45% and diastolic dysfunction  Continue home Bumex 2 mg daily, spironolactone 25 mg daily     Afib  HTN  Continue metoprolol succinate 12.5 mg daily  Continue Eliquis 2.5 mg b.i.d., decreased at previous facility due to age and renal function     CKD  Monitor renal function closely with Bumex 2 mg daily and spironolactone 25 mg daily     Gout, arthritis   Started on colchicine 0.6 mg daily on 09/06/2024  Continue prednisone 20 mg daily x3 days starting 09/11/2024  Left wrist x-ray 09/06/2024 showed inflammatory arthritis with 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  Anticipated discharge and disposition: Continue PT/OT  __________________________________________________________________________    LABS/MICRO/MEDS/DIAGNOSTICS     LABS  No results for input(s): "NA", "K", "CHLORIDE", "CO2", "BUN", "CREATININE", "GLUCOSE", "CALCIUM", "ALKPHOS", "AST", "ALT", " ""ALBUMIN" in the last 72 hours.    No results for input(s): "WBC", "RBC", "HCT", "MCV", "PLT" in the last 72 hours.    Invalid input(s): "HG"    MICROBIOLOGY  Microbiology Results (last 7 days)       Procedure Component Value Units Date/Time    Wound Culture [[id number]]  (Abnormal) Collected: 09/11/24 1533    Order Status: Completed Specimen: Drainage Updated: 09/14/24 0923     Wound Culture Many Staphylococcus aureus      Many GAMMA STREPTOCOCCUS     Comment: with normal skin [place]                MEDICATIONS   apixaban  2.5 mg Oral BID    bumetanide  2 mg Oral Daily    colchicine  0.6 mg Oral Daily    methIMAzole  2.5 mg Oral Daily    metoprolol succinate  12.5 mg Oral Daily    miconazole   Topical (Top) BID    sertraline  50 mg Oral Daily    spironolactone  25 mg Oral Daily         INFUSIONS       DIAGNOSTIC TESTS  Fl Modified Barium Swallow Speech   Final Result         No results found for: "EF"     NUTRITION STATUS  Patient meets ASPEN criteria for mild malnutrition of acute illness or injury per RD assessment as evidenced by:  Energy Intake (Malnutrition): less than 75% for greater than or equal to 1 month  Weight Loss (Malnutrition): other (see comments) (wt loss unsure)  Subcutaneous Fat (Malnutrition): other (see comments) (doesn't meet criteria)  Muscle Mass (Malnutrition): other (see comments) (doesn't meet criteria)  Fluid Accumulation (Malnutrition): other (see comments) (doesn't meet criteria)  Hand  Strength, Left (Malnutrition): unable to evaluate  Hand  Strength, Right (Malnutrition): unable to evaluate  A minimum of two characteristics is recommended for diagnosis of either severe or non-severe malnutrition.     Case related differential diagnoses have been reviewed; assessment and plan has been documented. I have personally reviewed the labs and test results that are currently available; I have reviewed the patients medication list. I have reviewed the consulting providers " recommendations. I have reviewed or attempted to review medical records based upon their availability.  All of the patient's and/or family's questions have been addressed and answered to the best of my ability.  I will continue to monitor closely and make adjustments to medical management as needed.  This document was created using M*Modal Fluency Direct.  Transcription errors may have been made.  Please contact me if any questions may rise regarding documentation to clarify transcription.      Thairy G Reyes, DO   Internal Medicine  Department of Hospital Medicine [facility]

## 2024-09-15 PROCEDURE — 11000004 HC SNF PRIVATE

## 2024-09-15 PROCEDURE — 27000207 HC ISOLATION

## 2024-09-15 PROCEDURE — 94760 N-INVAS EAR/PLS OXIMETRY 1: CPT

## 2024-09-15 PROCEDURE — 99900035 HC TECH TIME PER 15 MIN (STAT)

## 2024-09-15 PROCEDURE — 25000003 PHARM REV CODE 250: Performed by: PHYSICIAN ASSISTANT

## 2024-09-15 RX ADMIN — BUMETANIDE 2 MG: 1 TABLET ORAL at 09:09

## 2024-09-15 RX ADMIN — APIXABAN 2.5 MG: 2.5 TABLET, FILM COATED ORAL at 08:09

## 2024-09-15 RX ADMIN — METOPROLOL SUCCINATE 12.5 MG: 25 TABLET, EXTENDED RELEASE ORAL at 09:09

## 2024-09-15 RX ADMIN — COLCHICINE 0.6 MG: 0.6 TABLET, FILM COATED ORAL at 09:09

## 2024-09-15 RX ADMIN — SERTRALINE HYDROCHLORIDE 50 MG: 50 TABLET ORAL at 09:09

## 2024-09-15 RX ADMIN — DICLOFENAC SODIUM 2 G: 10 GEL TOPICAL at 08:09

## 2024-09-15 RX ADMIN — MICONAZOLE NITRATE: 20 CREAM TOPICAL at 08:09

## 2024-09-15 RX ADMIN — APIXABAN 2.5 MG: 2.5 TABLET, FILM COATED ORAL at 09:09

## 2024-09-15 RX ADMIN — MICONAZOLE NITRATE: 20 CREAM TOPICAL at 09:09

## 2024-09-15 RX ADMIN — SPIRONOLACTONE 25 MG: 25 TABLET ORAL at 09:09

## 2024-09-15 RX ADMIN — METHIMAZOLE 2.5 MG: 5 TABLET ORAL at 09:09

## 2024-09-15 NOTE — NURSING
Patient's grandson visiting in room, came out into [place] and requested ice for patient, I went into room and explained the risk of aspiration with patient and grandson, both voiced understanding, ice was then discarded. Plan of care ongoing, comfort and safety measures maintained.

## 2024-09-15 NOTE — PLAN OF CARE
Problem: Adult Inpatient Plan of Care  Goal: Plan of Care Review  Outcome: Progressing  Goal: Patient-Specific Goal (Individualized)  Outcome: Progressing  Goal: Absence of Hospital-Acquired Illness or Injury  Outcome: Progressing  Intervention: Identify and Manage Fall Risk  Flowsheets (Taken 9/15/2024 0821)  Safety Promotion/Fall Prevention:   assistive device/personal item within reach   Fall Risk reviewed with patient/family   bed alarm set   Fall Risk signage in place   gait belt with ambulation   instructed to call staff for mobility   nonskid shoes/socks when out of bed   patient expresses understanding of fall risk and prevention   room near unit station   side rails raised x 3  Intervention: Prevent Skin Injury  Flowsheets (Taken 9/15/2024 0821)  Body Position:   position changed independently   upper extremity elevated   weight shifting  Skin Protection:   incontinence pads utilized   skin sealant/moisture barrier applied  Device Skin Pressure Protection:   adhesive use limited   positioning supports utilized  Intervention: Prevent and Manage VTE (Venous Thromboembolism) Risk  Flowsheets (Taken 9/15/2024 0821)  VTE Prevention/Management:   bleeding precautions maintained   fluids promoted  Intervention: Prevent Infection  Flowsheets (Taken 9/15/2024 0821)  Infection Prevention:   environmental surveillance performed   hand hygiene promoted   rest/sleep promoted   single patient room provided  Goal: Optimal Comfort and Wellbeing  Outcome: Progressing  Intervention: Monitor Pain and Promote Comfort  Flowsheets (Taken 9/15/2024 0821)  Pain Management Interventions:   pain management plan reviewed with patient/caregiver   pillow support provided   quiet environment facilitated   relaxation techniques promoted  Intervention: Provide Person-Centered Care  Flowsheets (Taken 9/15/2024 0821)  Trust Relationship/Rapport:   care explained   choices provided   emotional support provided   empathic listening  provided   questions answered   questions encouraged   reassurance provided   thoughts/feelings acknowledged  Goal: Readiness for Transition of Care  Outcome: Progressing  Intervention: Mutually Develop Transition Plan  Flowsheets (Taken 9/15/2024 0821)  Equipment Currently Used at Home:   walker, rolling   bath bench   wheelchair  Transportation Anticipated: family or friend will provide  Who are your caregiver(s) and their phone number(s)?: Blessing. daughter, [phone]  Communicated SAMARIA with patient/caregiver: Date not available/Unable to determine  Do you expect to return to your current living situation?: Yes  Do you have help at home or someone to help you manage your care at home?: Yes  Readmission within 30 days?: No  Do you currently have service(s) that help you manage your care at home?: No  Is the pt/caregiver preference to resume services with current agency: No

## 2024-09-15 NOTE — PROGRESS NOTES
[facility]  hospitals MEDICINE ~ PROGRESS NOTE    CHIEF COMPLAINT   Hospital follow up for debility and acute on chronic HFrEF    HOSPITAL COURSE   89-year-old female with a past medical history of CHF, AFib, HTN, HLD a CKD, hypothyroidism, gout, arthritis, vitamin-D deficiency who presented to [facility] on 09/04/2024 acute on chronic HFrEF.  BNP was elevated on arrival CXR showed cardiomegaly.  She initially required supplemental oxygen.  Echo obtained showed EF 40-45% and diastolic dysfunction.  Patient was evaluated by Cardiology who initially had patient on IV Lasix and switch to home Bumex and spironolactone.  Eliquis was decreased to 2.5 mg b.i.d. due to age and renal function.  Patient was also initiated on metoprolol succinate 2.5 mg daily.  Patient continued to complain arthritic pain.  Patient was initiated on colchicine on 09/06/2024.  She was discharged on prednisone 20 mg daily for 3 days.  [facility].  Labs yesterday revealed BUN 90, creatinine around baseline.  Repeat labs today showed BUN improved to 80 with creatinine also improved.  Patient continues to complain of arthritic pain, adding lidocaine patches as needed.  Patient will be receiving therapy evaluations today.     Today:  Patient's POA at bedside today, Margarita, family agrees patient has expressed desire for comfort measures previously, they are requesting evaluation with [facility] and understand the incurred aspiration risk.  OBJECTIVE/PHYSICAL EXAM     VITAL SIGNS (MOST RECENT):  Temp: 97.6 °F (36.4 °C) (09/15/24 1125)  Pulse: 61 (09/15/24 1125)  Resp: 18 (09/15/24 0746)  BP: 125/73 (09/15/24 1125)  SpO2: (!) 94 % (09/15/24 1125) VITAL SIGNS (24 HOUR RANGE):  Temp:  [97.5 °F (36.4 °C)-98.8 °F (37.1 °C)] 97.6 °F (36.4 °C)  Pulse:  [58-68] 61  Resp:  [14-18] 18  SpO2:  [94 %-97 %] 94 %  BP: ()/(56-73) 125/73   GENERAL: In no acute distress,  "afebrile  HEENT: Atraumatic, normocephalic, moist mucous membranes   CHEST: Clear to auscultation bilaterally  HEART: S1, S2, no appreciable murmur  ABDOMEN: Soft, nontender, BS +  MSK: Warm, no lower extremity edema, no clubbing or cyanosis  NEUROLOGIC: Alert and oriented x4, moving all extremities with good strength   INTEGUMENTARY: See media for gluteal and vaginal areas, no active drainage from gluteal wound     ASSESSMENT/PLAN   Acute on chronic HFrEF  Echo EF 40-45% and diastolic dysfunction  Continue home Bumex 2 mg daily, spironolactone 25 mg daily     Afib  HTN  Continue metoprolol succinate 12.5 mg daily  Continue Eliquis 2.5 mg b.i.d., decreased at previous facility due to age and renal function     CKD  Monitor renal function closely with Bumex 2 mg daily and spironolactone 25 mg daily     Gout, arthritis   Started on colchicine 0.6 mg daily on 09/06/2024  Continue prednisone 20 mg daily x3 days starting 09/11/2024  Left wrist x-ray 09/06/2024 showed inflammatory arthritis 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  Anticipated discharge and disposition: tbd  __________________________________________________________________________    LABS/MICRO/MEDS/DIAGNOSTICS     LABS  No results for input(s): "NA", "K", "CHLORIDE", "CO2", "BUN", "CREATININE", "GLUCOSE", "CALCIUM", "ALKPHOS", "AST", "ALT", "ALBUMIN" in the last 72 hours.    No results for input(s): "WBC", "RBC", "HCT", "MCV", " ""PLT" in the last 72 hours.    Invalid input(s): "HG"    MICROBIOLOGY  Microbiology Results (last 7 days)       Procedure Component Value Units Date/Time    Wound Culture [[id number]]  (Abnormal)  (Susceptibility) Collected: 09/11/24 1533    Order Status: Resulted Specimen: Drainage Updated: 09/15/24 0932     Wound Culture Many Methicillin resistant Staphylococcus aureus      Many GAMMA STREPTOCOCCUS     Comment: with normal skin [place]                MEDICATIONS   apixaban  2.5 mg Oral BID    bumetanide  2 mg Oral Daily    colchicine  0.6 mg Oral Daily    methIMAzole  2.5 mg Oral Daily    metoprolol succinate  12.5 mg Oral Daily    miconazole   Topical (Top) BID    sertraline  50 mg Oral Daily    spironolactone  25 mg Oral Daily         INFUSIONS       DIAGNOSTIC TESTS  Fl Modified Barium Swallow Speech   Final Result         No results found for: "EF"     NUTRITION STATUS  Patient meets ASPEN criteria for mild malnutrition of acute illness or injury per RD assessment as evidenced by:  Energy Intake (Malnutrition): less than 75% for greater than or equal to 1 month  Weight Loss (Malnutrition): other (see comments) (wt loss unsure)  Subcutaneous Fat (Malnutrition): other (see comments) (doesn't meet criteria)  Muscle Mass (Malnutrition): other (see comments) (doesn't meet criteria)  Fluid Accumulation (Malnutrition): other (see comments) (doesn't meet criteria)  Hand  Strength, Left (Malnutrition): unable to evaluate  Hand  Strength, Right (Malnutrition): unable to evaluate  A minimum of two characteristics is recommended for diagnosis of either severe or non-severe malnutrition.     Case related differential diagnoses have been reviewed; assessment and plan has been documented. I have personally reviewed the labs and test results that are currently available; I have reviewed the patients medication list. I have reviewed the consulting providers recommendations. I have reviewed or attempted to review medical " records based upon their availability.  All of the patient's and/or family's questions have been addressed and answered to the best of my ability.  I will continue to monitor closely and make adjustments to medical management as needed.  This document was created using M*Modal Fluency Direct.  Transcription errors may have been made.  Please contact me if any questions may rise regarding documentation to clarify transcription.      Thairy G Reyes, DO   Internal Medicine  Department of Hospital Medicine [facility]

## 2024-09-15 NOTE — PLAN OF CARE
Problem: Adult Inpatient Plan of Care  Goal: Patient-Specific Goal (Individualized)  Outcome: Progressing  Flowsheets (Taken 9/14/2024 1945)  Individualized Care Needs: monitor respiratory status with food and drink, wound care  Anxieties, Fears or Concerns: None expressed  Patient/Family-Specific Goals (Include Timeframe): Return to baseline by discharge     Problem: Wound  Goal: Skin Health and Integrity  Outcome: Progressing

## 2024-09-16 LAB
ANION GAP SERPL CALC-SCNC: 9 MEQ/L
BACTERIA WND CULT: ABNORMAL
BACTERIA WND CULT: ABNORMAL
BASOPHILS # BLD AUTO: 0.03 X10(3)/MCL
BASOPHILS NFR BLD AUTO: 0.3 %
BUN SERPL-MCNC: 52.8 MG/DL (ref 9.8–20.1)
CALCIUM SERPL-MCNC: 9.5 MG/DL (ref 8.4–10.2)
CHLORIDE SERPL-SCNC: 104 MMOL/L (ref 98–111)
CO2 SERPL-SCNC: 26 MMOL/L (ref 23–31)
CREAT SERPL-MCNC: 1.4 MG/DL (ref 0.55–1.02)
CREAT/UREA NIT SERPL: 38
EOSINOPHIL # BLD AUTO: 0.17 X10(3)/MCL (ref 0–0.9)
EOSINOPHIL NFR BLD AUTO: 1.5 %
ERYTHROCYTE [DISTWIDTH] IN BLOOD BY AUTOMATED COUNT: 13.7 % (ref 11.5–17)
GFR SERPLBLD CREATININE-BSD FMLA CKD-EPI: 35 ML/MIN/1.73/M2
GLUCOSE SERPL-MCNC: 93 MG/DL (ref 75–121)
HCT VFR BLD AUTO: 47.5 % (ref 37–47)
HGB BLD-MCNC: 15.5 G/DL (ref 12–16)
IMM GRANULOCYTES # BLD AUTO: 0.08 X10(3)/MCL (ref 0–0.04)
IMM GRANULOCYTES NFR BLD AUTO: 0.7 %
LYMPHOCYTES # BLD AUTO: 2.61 X10(3)/MCL (ref 0.6–4.6)
LYMPHOCYTES NFR BLD AUTO: 22.8 %
MCH RBC QN AUTO: 31.4 PG (ref 27–31)
MCHC RBC AUTO-ENTMCNC: 32.6 G/DL (ref 33–36)
MCV RBC AUTO: 96.2 FL (ref 80–94)
MONOCYTES # BLD AUTO: 0.86 X10(3)/MCL (ref 0.1–1.3)
MONOCYTES NFR BLD AUTO: 7.5 %
NEUTROPHILS # BLD AUTO: 7.71 X10(3)/MCL (ref 2.1–9.2)
NEUTROPHILS NFR BLD AUTO: 67.2 %
PLATELET # BLD AUTO: 211 X10(3)/MCL (ref 130–400)
PMV BLD AUTO: 11.6 FL (ref 7.4–10.4)
POTASSIUM SERPL-SCNC: 4.1 MMOL/L (ref 3.5–5.1)
RBC # BLD AUTO: 4.94 X10(6)/MCL (ref 4.2–5.4)
SODIUM SERPL-SCNC: 139 MMOL/L (ref 136–145)
WBC # BLD AUTO: 11.46 X10(3)/MCL (ref 4.5–11.5)

## 2024-09-16 PROCEDURE — 94760 N-INVAS EAR/PLS OXIMETRY 1: CPT

## 2024-09-16 PROCEDURE — 85025 COMPLETE CBC W/AUTO DIFF WBC: CPT | Performed by: PHYSICIAN ASSISTANT

## 2024-09-16 PROCEDURE — 36415 COLL VENOUS BLD VENIPUNCTURE: CPT | Performed by: PHYSICIAN ASSISTANT

## 2024-09-16 PROCEDURE — 25000003 PHARM REV CODE 250: Performed by: PHYSICIAN ASSISTANT

## 2024-09-16 PROCEDURE — 11000004 HC SNF PRIVATE

## 2024-09-16 PROCEDURE — 27000207 HC ISOLATION

## 2024-09-16 PROCEDURE — 99900035 HC TECH TIME PER 15 MIN (STAT)

## 2024-09-16 PROCEDURE — 80048 BASIC METABOLIC PNL TOTAL CA: CPT | Performed by: PHYSICIAN ASSISTANT

## 2024-09-16 RX ADMIN — APIXABAN 2.5 MG: 2.5 TABLET, FILM COATED ORAL at 08:09

## 2024-09-16 RX ADMIN — SERTRALINE HYDROCHLORIDE 50 MG: 50 TABLET ORAL at 09:09

## 2024-09-16 RX ADMIN — APIXABAN 2.5 MG: 2.5 TABLET, FILM COATED ORAL at 09:09

## 2024-09-16 RX ADMIN — MICONAZOLE NITRATE: 20 CREAM TOPICAL at 09:09

## 2024-09-16 RX ADMIN — METOPROLOL SUCCINATE 12.5 MG: 25 TABLET, EXTENDED RELEASE ORAL at 09:09

## 2024-09-16 RX ADMIN — DICLOFENAC SODIUM 2 G: 10 GEL TOPICAL at 08:09

## 2024-09-16 RX ADMIN — METHIMAZOLE 2.5 MG: 5 TABLET ORAL at 09:09

## 2024-09-16 RX ADMIN — SPIRONOLACTONE 25 MG: 25 TABLET ORAL at 09:09

## 2024-09-16 RX ADMIN — BUMETANIDE 2 MG: 1 TABLET ORAL at 09:09

## 2024-09-16 RX ADMIN — COLCHICINE 0.6 MG: 0.6 TABLET, FILM COATED ORAL at 09:09

## 2024-09-16 NOTE — PROGRESS NOTES
[facility]  [facility] MEDICINE ~ PROGRESS NOTE    CHIEF COMPLAINT   Hospital follow up for debility and acute on chronic HFrEF    HOSPITAL COURSE   89-year-old female with a past medical history of CHF, AFib, HTN, HLD a CKD, hypothyroidism, gout, arthritis, vitamin-D deficiency who presented to [facility] on 09/04/2024 acute on chronic HFrEF.  BNP was elevated on arrival CXR showed cardiomegaly.  She initially required supplemental oxygen.  Echo obtained showed EF 40-45% and diastolic dysfunction.  Patient was evaluated by Cardiology who initially had patient on IV Lasix and switch to home Bumex and spironolactone.  Eliquis was decreased to 2.5 mg b.i.d. due to age and renal function.  Patient was also initiated on metoprolol succinate 2.5 mg daily.  Patient continued to complain arthritic pain.  Patient was initiated on colchicine on 09/06/2024.  She was discharged on prednisone 20 mg daily for 3 days.  Patient was transferred to our [facility] for continued therapy.  Labs yesterday revealed BUN 90, creatinine around baseline.  Repeat labs today showed BUN improved to 80 with creatinine also improved.  Patient continues to complain of arthritic pain, adding lidocaine patches as needed.  Patient will be receiving therapy evaluations today.     Today:  Patient has stopped participating with physical therapy.  Pending hospice evaluation.  Can discharge with hospice if or when she was accepted.  OBJECTIVE/PHYSICAL EXAM     VITAL SIGNS (MOST RECENT):  Temp: 97.8 °F (36.6 °C) (09/16/24 1218)  Pulse: 69 (09/16/24 1218)  Resp: 18 (09/16/24 0317)  BP: 109/73 (09/16/24 1218)  SpO2: 97 % (09/16/24 1218) VITAL SIGNS (24 HOUR RANGE):  Temp:  [97.7 °F (36.5 °C)-98.1 °F (36.7 °C)] 97.8 °F (36.6 °C)  Pulse:  [61-69] 69  Resp:  [17-18] 18  SpO2:  [93 %-97 %] 97 %  BP: (106-118)/(63-76) 109/73   GENERAL: In no acute distress, afebrile  HEENT: Atraumatic, normocephalic, moist mucous membranes   CHEST:  Clear to auscultation bilaterally  HEART: S1, S2, no appreciable murmur  ABDOMEN: Soft, nontender, BS +  MSK: Warm, no lower extremity edema, no clubbing or cyanosis  NEUROLOGIC: Alert and oriented x4, moving all extremities with good strength   INTEGUMENTARY: See media for gluteal and vaginal areas, no active drainage from gluteal wound     ASSESSMENT/PLAN   Acute on chronic HFrEF  Echo EF 40-45% and diastolic dysfunction  Continue home Bumex 2 mg daily, spironolactone 25 mg daily     Afib  HTN  Continue metoprolol succinate 12.5 mg daily  Continue Eliquis 2.5 mg b.i.d., decreased at previous facility due to age and renal function     CKD-stable  Monitor renal function closely with Bumex 2 mg daily and spironolactone 25 mg daily, tolerating      Gout, arthritis   Started on colchicine 0.6 mg daily on 09/06/2024  Continue prednisone 20 mg daily x3 days starting 09/11/2024  Left wrist x-ray 09/06/2024 showed inflammatory arthritis with degenerative arthritis at the triscaphe join  Lidocaine patch/Voltaren gel as needed    Vaginal lesions  Gluteal wound  Gluteal wound culture showing gram positive cocci - suspect superficial swab was obtained as no drainage from wound  Conservative management, defer antibiotics as no evidence of acute infection at this time      Dysphagia  -pt refuses peg, high aspiration risk, educated on this  -family requested eval with [facility]    Hx of HLD, hypothyroidism, vitamin-D deficiency, depression      DVT prophylaxis:  Eliquis at decreased dose of 2.5 mg b.i.d.  Anticipated discharge and disposition: tbd  __________________________________________________________________________    LABS/MICRO/MEDS/DIAGNOSTICS     LABS  Recent Labs     09/16/24  0408      K 4.1   CO2 26   BUN 52.8*   CREATININE 1.40*   GLUCOSE 93   CALCIUM 9.5       Recent Labs     09/16/24  0408   WBC 11.46   RBC 4.94   HCT 47.5*   MCV 96.2*          MICROBIOLOGY  Microbiology Results (last 7 days)   "     Procedure Component Value Units Date/Time    Wound Culture [[id number]]  (Abnormal)  (Susceptibility) Collected: 09/11/24 1533    Order Status: Completed Specimen: Drainage Updated: 09/16/24 1029     Wound Culture Many Methicillin resistant Staphylococcus aureus      Many Enterococcus faecalis     Comment: with normal skin [place]                MEDICATIONS   apixaban  2.5 mg Oral BID    bumetanide  2 mg Oral Daily    colchicine  0.6 mg Oral Daily    methIMAzole  2.5 mg Oral Daily    metoprolol succinate  12.5 mg Oral Daily    miconazole   Topical (Top) BID    sertraline  50 mg Oral Daily    spironolactone  25 mg Oral Daily         INFUSIONS       DIAGNOSTIC TESTS  Fl Modified Barium Swallow Speech   Final Result         No results found for: "EF"     NUTRITION STATUS  Patient meets ASPEN criteria for mild malnutrition of acute illness or injury per RD assessment as evidenced by:  Energy Intake (Malnutrition): less than 75% for greater than or equal to 1 month  Weight Loss (Malnutrition): other (see comments) (wt loss unsure)  Subcutaneous Fat (Malnutrition): other (see comments) (doesn't meet criteria)  Muscle Mass (Malnutrition): other (see comments) (doesn't meet criteria)  Fluid Accumulation (Malnutrition): other (see comments) (doesn't meet criteria)  Hand  Strength, Left (Malnutrition): unable to evaluate  Hand  Strength, Right (Malnutrition): unable to evaluate  A minimum of two characteristics is recommended for diagnosis of either severe or non-severe malnutrition.     Case related differential diagnoses have been reviewed; assessment and plan has been documented. I have personally reviewed the labs and test results that are currently available; I have reviewed the patients medication list. I have reviewed the consulting providers recommendations. I have reviewed or attempted to review medical records based upon their availability.  All of the patient's and/or family's questions have been " addressed and answered to the best of my ability.  I will continue to monitor closely and make adjustments to medical management as needed.  This document was created using M*Modal Fluency Direct.  Transcription errors may have been made.  Please contact me if any questions may rise regarding documentation to clarify transcription.      Thairy G Reyes, DO   Internal Medicine  Department of Hospital Medicine  [facility]

## 2024-09-16 NOTE — PT/OT/SLP PROGRESS
Name: Baudilio Mantilla    : 1932 (89 y.o.)  MRN: [id number]           Patient Unavailable      Patient unable to be seen at this time secondary to: Patient refused therapy this AM. Will f/u as appropriate.

## 2024-09-16 NOTE — PT/OT/SLP PROGRESS
Name: Baudilio Mantilla    : 1932 (89 y.o.)  MRN: [id number]           Patient Unavailable      Patient unable to be seen at this time secondary to: pt refused AM treatment

## 2024-09-16 NOTE — PLAN OF CARE
Problem: Adult Inpatient Plan of Care  Goal: Plan of Care Review  Outcome: Progressing  Goal: Patient-Specific Goal (Individualized)  Outcome: Progressing  Goal: Absence of Hospital-Acquired Illness or Injury  Outcome: Progressing  Intervention: Identify and Manage Fall Risk  Flowsheets (Taken 9/16/2024 1035)  Safety Promotion/Fall Prevention:   assistive device/personal item within reach   bed alarm set   Fall Risk reviewed with patient/family   Fall Risk signage in place   instructed to call staff for mobility   patient expresses understanding of fall risk and prevention   room near unit station   side rails raised x 3  Intervention: Prevent Skin Injury  Flowsheets (Taken 9/16/2024 1035)  Body Position:   weight shifting   upper extremity elevated   neutral body alignment  Skin Protection:   incontinence pads utilized   skin sealant/moisture barrier applied  Device Skin Pressure Protection:   absorbent pad utilized/changed   positioning supports utilized   tubing/devices free from skin contact  Intervention: Prevent and Manage VTE (Venous Thromboembolism) Risk  Flowsheets (Taken 9/16/2024 1035)  VTE Prevention/Management:   bleeding precautions maintained   fluids promoted  Intervention: Prevent Infection  Flowsheets (Taken 9/16/2024 1035)  Infection Prevention:   environmental surveillance performed   hand hygiene promoted   rest/sleep promoted   single patient room provided  Goal: Optimal Comfort and Wellbeing  Outcome: Progressing  Intervention: Monitor Pain and Promote Comfort  Flowsheets (Taken 9/16/2024 1035)  Pain Management Interventions:   pain management plan reviewed with patient/caregiver   pillow support provided   position adjusted   quiet environment facilitated   relaxation techniques promoted  Intervention: Provide Person-Centered Care  Flowsheets (Taken 9/16/2024 1035)  Trust Relationship/Rapport:   care explained   choices provided   emotional support provided   empathic listening provided    questions answered   questions encouraged   reassurance provided   thoughts/feelings acknowledged  Goal: Readiness for Transition of Care  Outcome: Progressing  Intervention: Mutually Develop Transition Plan  Flowsheets (Taken 9/16/2024 1037)  Equipment Currently Used at Home:   bath bench   walker, rolling   wheelchair  Transportation Anticipated: family or friend will provide  Who are your caregiver(s) and their phone number(s)?: daughterBlessing, [phone]  Communicated SAMARIA with patient/caregiver: Date not available/Unable to determine  Do you expect to return to your current living situation?: Yes  Do you have help at home or someone to help you manage your care at home?: Yes  Readmission within 30 days?: No  Do you currently have service(s) that help you manage your care at home?: No  Is the pt/caregiver preference to resume services with current agency: No

## 2024-09-16 NOTE — PT/OT/SLP PROGRESS
Name: Baudilio Mantilla    : 1932 (89 y.o.)  MRN: [id number]           Patient Unavailable      Patient unable to be seen at this time secondary to: Pt refused tx this AM. OT plans to f/u later this PM to continue OT POC.

## 2024-09-16 NOTE — PT/OT/SLP PROGRESS
Name: Baudilio Matnilla    : 1932 (89 y.o.)  MRN: [id number]           Patient Unavailable      Patient unable to be seen at this time secondary to: pt refused treatment this PM

## 2024-09-16 NOTE — PLAN OF CARE
Problem: Adult Inpatient Plan of Care  Goal: Plan of Care Review  Outcome: Progressing  Flowsheets (Taken 9/15/2024 2151)  Plan of Care Reviewed With: patient  Goal: Patient-Specific Goal (Individualized)  Outcome: Progressing  Flowsheets (Taken 9/15/2024 2151)  Individualized Care Needs: Aspiration precuations, wound care, Assist feed, Left wrist pain management  Anxieties, Fears or Concerns: Disliked mildly thick liquids  Goal: Absence of Hospital-Acquired Illness or Injury  Outcome: Progressing  Intervention: Identify and Manage Fall Risk  Flowsheets (Taken 9/15/2024 2151)  Safety Promotion/Fall Prevention:   assistive device/personal item within reach   bed alarm set   lighting adjusted   medications reviewed   muscle strengthening facilitated   nonskid shoes/socks when out of bed   room near unit station  Intervention: Prevent Skin Injury  Flowsheets (Taken 9/15/2024 2151)  Body Position: sitting up in bed  Skin Protection:   incontinence pads utilized   skin sealant/moisture barrier applied   transparent dressing maintained   silicone foam dressing in place  Device Skin Pressure Protection:   absorbent pad utilized/changed   positioning supports utilized   tubing/devices free from skin contact  Intervention: Prevent and Manage VTE (Venous Thromboembolism) Risk  Flowsheets (Taken 9/15/2024 2151)  VTE Prevention/Management:   bleeding risk assessed   bleeding precautions maintained   ROM (active) performed   fluids promoted  Intervention: Prevent Infection  Flowsheets (Taken 9/15/2024 2151)  Infection Prevention:   cohorting utilized   hand hygiene promoted   single patient room provided  Goal: Optimal Comfort and Wellbeing  Outcome: Progressing  Intervention: Monitor Pain and Promote Comfort  Flowsheets (Taken 9/15/2024 2151)  Pain Management Interventions:   diversional activity provided   pillow support provided   position adjusted   quiet environment facilitated  Intervention: Provide Person-Centered  Induction set up for urs   Care  Flowsheets (Taken 9/15/2024 2151)  Trust Relationship/Rapport:   care explained   choices provided   questions answered   thoughts/feelings acknowledged     Problem: Infection  Goal: Absence of Infection Signs and Symptoms  Outcome: Progressing  Intervention: Prevent or Manage Infection  Flowsheets (Taken 9/15/2024 2151)  Fever Reduction/Comfort Measures:   lightweight bedding   lightweight clothing  Infection Management: aseptic technique maintained  Isolation Precautions:   precautions maintained   contact     Problem: Wound  Goal: Optimal Coping  Outcome: Progressing  Intervention: Support Patient and Family Response  Flowsheets (Taken 9/15/2024 2151)  Supportive Measures:   active listening utilized   goal-setting facilitated   self-care encouraged  Family/Support System Care: self-care encouraged  Goal: Optimal Functional Ability  Outcome: Progressing  Intervention: Optimize Functional Ability  Flowsheets (Taken 9/15/2024 2151)  Assistive Device Utilized: lift device  Activity Management: Rolling - L1  Activity Assistance Provided: assistance, 1 person  Goal: Absence of Infection Signs and Symptoms  Outcome: Progressing  Intervention: Prevent or Manage Infection  Flowsheets (Taken 9/15/2024 2151)  Fever Reduction/Comfort Measures:   lightweight bedding   lightweight clothing  Infection Management: aseptic technique maintained  Isolation Precautions:   precautions maintained   contact  Goal: Improved Oral Intake  Outcome: Progressing  Intervention: Promote and Optimize Oral Intake  Flowsheets (Taken 9/15/2024 2151)  Oral Nutrition Promotion:   adaptive equipment use encouraged   calorie-dense foods provided   calorie-dense liquids provided   social interaction promoted   rest periods promoted  Nutrition Interventions:   frequent small meals provided   food preferences provided  Goal: Optimal Pain Control and Function  Outcome: Progressing  Intervention: Prevent or Manage Pain  Flowsheets (Taken 9/15/2024  2151)  Sleep/Rest Enhancement:   consistent schedule promoted   regular sleep/rest pattern promoted   relaxation techniques promoted  Pain Management Interventions:   diversional activity provided   pillow support provided   position adjusted   quiet environment facilitated  Goal: Skin Health and Integrity  Outcome: Progressing  Intervention: Optimize Skin Protection  Flowsheets (Taken 9/15/2024 2151)  Pressure Reduction Techniques:   frequent weight shift encouraged   rest period provided between sit times   weight shift assistance provided  Pressure Reduction Devices: positioning supports utilized  Skin Protection:   incontinence pads utilized   skin sealant/moisture barrier applied   transparent dressing maintained   silicone foam dressing in place  Activity Management: Rolling - L1  Head of Bed (HOB) Positioning: HOB at 30-45 degrees  Goal: Optimal Wound Healing  Outcome: Progressing  Intervention: Promote Wound Healing  Flowsheets (Taken 9/15/2024 2151)  Sleep/Rest Enhancement:   consistent schedule promoted   regular sleep/rest pattern promoted   relaxation techniques promoted     Problem: Skin Injury Risk Increased  Goal: Skin Health and Integrity  Outcome: Progressing  Intervention: Optimize Skin Protection  Flowsheets (Taken 9/15/2024 2151)  Pressure Reduction Techniques:   frequent weight shift encouraged   rest period provided between sit times   weight shift assistance provided  Pressure Reduction Devices: positioning supports utilized  Skin Protection:   incontinence pads utilized   skin sealant/moisture barrier applied   transparent dressing maintained   silicone foam dressing in place  Activity Management: Rolling - L1  Head of Bed (HOB) Positioning: HOB at 30-45 degrees  Intervention: Promote and Optimize Oral Intake  Flowsheets (Taken 9/15/2024 2151)  Oral Nutrition Promotion:   adaptive equipment use encouraged   calorie-dense foods provided   calorie-dense liquids provided   social interaction  promoted   rest periods promoted  Nutrition Interventions:   frequent small meals provided   food preferences provided     Problem: Heart Failure  Goal: Optimal Cardiac Output  Outcome: Progressing  Intervention: Optimize Cardiac Output  Flowsheets (Taken 9/15/2024 2151)  Stabilization Measures: legs elevated  Environmental Support: calm environment promoted  Goal: Stable Heart Rate and Rhythm  Outcome: Progressing  Goal: Fluid and Electrolyte Balance  Outcome: Progressing  Intervention: Monitor and Manage Fluid and Electrolyte Balance  Flowsheets (Taken 9/15/2024 2151)  Fluid/Electrolyte Management: fluids provided     Problem: Fall Injury Risk  Goal: Absence of Fall and Fall-Related Injury  Outcome: Progressing  Intervention: Identify and Manage Contributors  Flowsheets (Taken 9/15/2024 2151)  Self-Care Promotion:   BADL personal objects within reach   BADL personal routines maintained   adaptive equipment use encouraged  Medication Review/Management: medications reviewed  Intervention: Promote Injury-Free Environment  Flowsheets (Taken 9/15/2024 2151)  Safety Promotion/Fall Prevention:   assistive device/personal item within reach   bed alarm set   lighting adjusted   medications reviewed   muscle strengthening facilitated   nonskid shoes/socks when out of bed   room near unit station

## 2024-09-16 NOTE — PROGRESS NOTES
Inpatient Nutrition Assessment    Admit Date: 9/10/2024   Total duration of encounter: 6 days   Patient Age: 89 y.o.    Nutrition Recommendation/Prescription     Continue heart healthy minced and moist mildly thick liquids (IDDSI level 2) 2. Add boost plus daily provides 360 kcal and 14 gm of protein per serving. 3. Monitor intake, wt, labs,medications    Communication of Recommendations: reviewed with patient    Nutrition Assessment     Malnutrition Assessment/Nutrition-Focused Physical Exam    Malnutrition Context: acute illness or injury (09/12/24 1445)  Malnutrition Level: mild (09/12/24 1445)  Energy Intake (Malnutrition): less than 75% for greater than or equal to 1 month (09/12/24 1445)  Weight Loss (Malnutrition): other (see comments) (wt loss unsure) (09/12/24 1445)  Subcutaneous Fat (Malnutrition): other (see comments) (doesn't meet criteria) (09/12/24 1445)  Orbital Region (Subcutaneous Fat Loss): well nourished  Upper Arm Region (Subcutaneous Fat Loss): well nourished     Muscle Mass (Malnutrition): other (see comments) (doesn't meet criteria) (09/12/24 1445)  Anabaptist Region (Muscle Loss): well nourished                       Fluid Accumulation (Malnutrition): other (see comments) (doesn't meet criteria) (09/12/24 1445)  Hand  Strength, Left (Malnutrition): unable to evaluate (09/12/24 1445)  Hand  Strength, Right (Malnutrition): unable to evaluate (09/12/24 1445)  A minimum of two characteristics is recommended for diagnosis of either severe or non-severe malnutrition.    Chart Review    Reason Seen: continuous nutrition monitoring and length of stay    Malnutrition Screening Tool Results   Have you recently lost weight without trying?: No  Have you been eating poorly because of a decreased appetite?: No   MST Score: 0   Diagnosis:  Acute on chronic HFrEF   Afib  HTN  CKD  Gout arthritis  Vaginal lesions  Gluteal wound    Relevant Medical History: CHF, AFib, HTN, HLD a CKD, hypothyroidism, gout,  arthritis, vitamin-D deficiency     Scheduled Medications:  apixaban, 2.5 mg, BID  bumetanide, 2 mg, Daily  colchicine, 0.6 mg, Daily  methIMAzole, 2.5 mg, Daily  metoprolol succinate, 12.5 mg, Daily  miconazole, , BID  sertraline, 50 mg, Daily  spironolactone, 25 mg, Daily    Continuous Infusions:   PRN Medications:  acetaminophen, 650 mg, Q4H PRN  albuterol-ipratropium, 3 mL, Q6H PRN  aluminum-magnesium hydroxide-simethicone, 30 mL, QID PRN  bisacodyL, 10 mg, Daily PRN  diclofenac sodium, 2 g, TID PRN  HYDROcodone-acetaminophen, 1 tablet, Q6H PRN  LIDOcaine, 1 patch, Q24H PRN  melatonin, 9 mg, Nightly PRN  morphine, 2 mg, Q6H PRN  naloxone, 0.02 mg, PRN  ondansetron, 8 mg, Q8H PRN  ondansetron, 4 mg, Q8H PRN  polyethylene glycol, 17 g, TID PRN  simethicone, 1 tablet, QID PRN  sodium chloride 0.9%, 10 mL, PRN  traMADoL, 50 mg, Q6H PRN    Calorie Containing IV Medications: no significant kcals from medications at this time    Recent Labs   Lab 09/10/24  0503 09/11/24  0911 09/16/24  0408    139 139   K 4.7 4.6 4.1   CALCIUM 9.6 10.2 9.5    102 104   CO2 24 27 26   BUN 90.0* 80.8* 52.8*   CREATININE 1.75* 1.47* 1.40*   EGFRNORACEVR 27 33 35   GLUCOSE 145* 140* 93   BILITOT 1.0  --   --    ALKPHOS 92  --   --    ALT 38  --   --    AST 30  --   --    ALBUMIN 2.7*  --   --    WBC 14.76*  --  11.46   HGB 14.7  --  15.5   HCT 43.7  --  47.5*     Nutrition Orders:  Diet Heart Healthy Minced & Moist (IDDSI Level 5); Mildly Thick Liquids (IDDSI Level 2)  Dietary nutrition supplements Daily; Boost Plus Nutritional Drink - Any flavor    Appetite/Oral Intake: poor/25-50% of meals  Factors Affecting Nutritional Intake: decreased appetite, difficulty/impaired swallowing, and respiratory status  Social Needs Impacting Access to Food: none identified  Food/Yarsanism/Cultural Preferences: none reported  Food Allergies:  Last Bowel Movement: 09/15/24  Wound(s):     Wound 09/04/24 1255 Pressure Injury Coccyx-Tissue loss  "description: Not applicable     Comments    09/12/24: Pt reports decrease intake for about month, less than 75% of meals. Pt ate 50% of breakfast this am, all grits, sausage, few bites eggs, juice, milk. Discussed food preferences to improved intake. Pt agree to drink boost supplement. SLP completed MBSS, recommend minced and moist mildly thick liquids. Will monitor.     09/16/24: Pt intake decrease, 28% average of meals over 8 days. Pt refusing therapy. MD discussed PEG tube placement and she refuses. Pt accept the risk of aspiration. Pt on modified diet. Pt not meeting nutrition needs. Patient requested hospice eval. Pt wants comfort measures only.     Anthropometrics    Height: 5' 3" (160 cm),    Last Weight: 72.3 kg (159 lb 6.3 oz) (09/16/24 1314), Weight Method: Bed Scale  BMI (Calculated): 28.2  BMI Classification: overweight (BMI 25-29.9)     Ideal Body Weight (IBW), Female: 115 lb     % Ideal Body Weight, Female (lb): 138.6 %                             Usual Weight Provided By: patient denies unintentional weight loss    Wt Readings from Last 5 Encounters:   09/16/24 72.3 kg (159 lb 6.3 oz)   09/08/24 78.1 kg (172 lb 2.9 oz)   03/06/24 69.9 kg (154 lb)   02/18/24 69.9 kg (154 lb)   12/19/23 81.6 kg (180 lb)     Weight Change(s) Since Admission: -6.6 kg wt loss  Wt Readings from Last 1 Encounters:   09/16/24 1314 72.3 kg (159 lb 6.3 oz)   09/16/24 0700 72.3 kg (159 lb 6.3 oz)   09/15/24 0630 74.5 kg (164 lb 3.9 oz)   09/13/24 0523 75.6 kg (166 lb 10.7 oz)   09/12/24 1443 76.4 kg (168 lb 6.9 oz)   09/12/24 0456 76.4 kg (168 lb 6.9 oz)   09/10/24 1817 78.9 kg (173 lb 15.1 oz)   Admit Weight: 78.9 kg (173 lb 15.1 oz) (09/10/24 1817), Weight Method: Bed Scale    Estimated Needs    Weight Used For Calorie Calculations: 72.3 kg (159 lb 6.3 oz)  Energy Calorie Requirements (kcal): 1807.5-2169 kcal (25-30 kcal/kg/CBW)  Energy Need Method: Kcal/kg  Weight Used For Protein Calculations: 72.3 kg (159 lb 6.3 " oz)  Protein Requirements: 72.45 gm (1gm/kg/CBW)  Fluid Requirements (mL): 1807.5 mL (1 mL/kcal)        Enteral Nutrition     Patient not receiving enteral nutrition at this time.    Parenteral Nutrition     Patient not receiving parenteral nutrition support at this time.    Evaluation of Received Nutrient Intake    Calories: not meeting estimated needs  Protein: not meeting estimated needs    Patient Education     Not applicable.    Nutrition Diagnosis     PES: Inadequate oral intake related to acute illness as evidenced by 25-50% of meals. (Continues)         Nutrition Interventions     Intervention(s): general/healthful diet, modified composition of meals/snacks, and commercial beverage    Goal: Consume % of meals/snacks by follow-up. (Goal not met)  Goal: Maintain weight throughout hospitalization. (Goal not met)    Nutrition Goals & Monitoring     Dietitian will monitor: food and beverage intake, weight, weight change, electrolyte/renal panel, glucose/endocrine profile, and gastrointestinal profile  Discharge planning: too early to determine; pending clinical course  Nutrition Risk/Follow-Up: moderate (follow-up in 3-5 days)   Please consult if re-assessment needed sooner.

## 2024-09-16 NOTE — PT/OT/SLP PROGRESS
Name: Baudilio Mantilla    : 1932 (89 y.o.)  MRN: [id number]           Patient Unavailable      Patient unable to be seen at this time secondary to: Pt refused tx this PM despite OT encouragement. OT plans to f/u tomorrow to continue OT POC.

## 2024-09-16 NOTE — PLAN OF CARE
[facility] patient request via [facility]. [facility] reaching out to patient and family for informational visit at this time

## 2024-09-17 VITALS
HEIGHT: 63 IN | HEART RATE: 66 BPM | OXYGEN SATURATION: 94 % | TEMPERATURE: 98 F | RESPIRATION RATE: 18 BRPM | DIASTOLIC BLOOD PRESSURE: 76 MMHG | SYSTOLIC BLOOD PRESSURE: 136 MMHG | WEIGHT: 162.25 LBS | BODY MASS INDEX: 28.75 KG/M2

## 2024-09-17 PROCEDURE — 97530 THERAPEUTIC ACTIVITIES: CPT

## 2024-09-17 PROCEDURE — 25000003 PHARM REV CODE 250: Performed by: PHYSICIAN ASSISTANT

## 2024-09-17 RX ORDER — DICLOFENAC SODIUM 10 MG/G
2 GEL TOPICAL 3 TIMES DAILY PRN
Qty: 50 G | Refills: 0 | Status: SHIPPED | OUTPATIENT
Start: 2024-09-17

## 2024-09-17 RX ORDER — COLCHICINE 0.6 MG/1
0.6 TABLET ORAL DAILY
Qty: 30 TABLET | Refills: 0 | Status: SHIPPED | OUTPATIENT
Start: 2024-09-17 | End: 2024-10-17

## 2024-09-17 RX ORDER — DOXYLAMINE SUCCINATE 25 MG
TABLET ORAL 2 TIMES DAILY
Qty: 14 G | Refills: 0 | Status: SHIPPED | OUTPATIENT
Start: 2024-09-17

## 2024-09-17 RX ORDER — BUMETANIDE 2 MG/1
2 TABLET ORAL DAILY
Qty: 30 TABLET | Refills: 0 | Status: SHIPPED | OUTPATIENT
Start: 2024-09-17 | End: 2024-09-17 | Stop reason: HOSPADM

## 2024-09-17 RX ORDER — SPIRONOLACTONE 25 MG/1
25 TABLET ORAL DAILY
Qty: 30 TABLET | Refills: 0 | Status: SHIPPED | OUTPATIENT
Start: 2024-09-17 | End: 2024-10-17

## 2024-09-17 RX ORDER — LIDOCAINE 50 MG/G
1 PATCH TOPICAL
Qty: 14 PATCH | Refills: 0 | Status: SHIPPED | OUTPATIENT
Start: 2024-09-17

## 2024-09-17 RX ORDER — METOPROLOL SUCCINATE 25 MG/1
12.5 TABLET, EXTENDED RELEASE ORAL DAILY
Qty: 15 TABLET | Refills: 0 | Status: SHIPPED | OUTPATIENT
Start: 2024-09-18 | End: 2024-10-18

## 2024-09-17 RX ORDER — PANTOPRAZOLE SODIUM 40 MG/1
40 TABLET, DELAYED RELEASE ORAL DAILY
Qty: 30 TABLET | Refills: 0 | Status: SHIPPED | OUTPATIENT
Start: 2024-09-17 | End: 2024-10-17

## 2024-09-17 RX ORDER — SERTRALINE HYDROCHLORIDE 50 MG/1
50 TABLET, FILM COATED ORAL DAILY
Qty: 30 TABLET | Refills: 0 | Status: SHIPPED | OUTPATIENT
Start: 2024-09-17 | End: 2024-10-17

## 2024-09-17 RX ORDER — BUMETANIDE 2 MG/1
2 TABLET ORAL DAILY
Qty: 30 TABLET | Refills: 0 | Status: SHIPPED | OUTPATIENT
Start: 2024-09-18 | End: 2024-10-18

## 2024-09-17 RX ADMIN — METHIMAZOLE 2.5 MG: 5 TABLET ORAL at 08:09

## 2024-09-17 RX ADMIN — APIXABAN 2.5 MG: 2.5 TABLET, FILM COATED ORAL at 08:09

## 2024-09-17 RX ADMIN — MICONAZOLE NITRATE: 20 CREAM TOPICAL at 08:09

## 2024-09-17 RX ADMIN — SPIRONOLACTONE 25 MG: 25 TABLET ORAL at 08:09

## 2024-09-17 RX ADMIN — METOPROLOL SUCCINATE 12.5 MG: 25 TABLET, EXTENDED RELEASE ORAL at 08:09

## 2024-09-17 RX ADMIN — SERTRALINE HYDROCHLORIDE 50 MG: 50 TABLET ORAL at 08:09

## 2024-09-17 RX ADMIN — BUMETANIDE 2 MG: 1 TABLET ORAL at 08:09

## 2024-09-17 RX ADMIN — COLCHICINE 0.6 MG: 0.6 TABLET, FILM COATED ORAL at 08:09

## 2024-09-17 NOTE — DISCHARGE INSTRUCTIONS
Please follow all discharge instructions as given. KEEP ALL FOLLOW UP APPOINTMENTS! If you experience any worsening or NEW signs or symptoms please call your primary care provider or head to your nearest emergency department.If you have any questions please call [phone].

## 2024-09-17 NOTE — PT/OT/SLP PROGRESS
Occupational Therapy  Treatment    Name: Baudilio Mantilla    : 1932 (89 y.o.)  MRN: [id number]           TREATMENT SUMMARY AND RECOMMENDATIONS:      OT Date of Treatment: 24  OT Start Time: 937  OT Stop Time: 945  OT Total Time (min): 8 min      Subjective Assessment:   No complaints  Lethargic   X Awake, alert, cooperative  Impulsive    Uncooperative   Flat affect    Agitated  c/o pain    Appropriate  c/o fatigue    Confused X Treated at bedside     Emotionally labile  Treated in gym/dept.      Other:        Therapy Precautions:    Cognitive deficits  Spinal precautions    Collar - hard  Sternal precautions    Collar - soft   TLSO   X Fall risk  LSO    Hip precautions - posterior  Knee immobilizer    Hip precautions - anterior  WBAT    Impaired communication  Partial weightbearing    Oxygen  TTWB    PEG tube  NWB    Visual deficits      Hearing deficits   Other:        Treatment Objectives:     Mobility Training:    Mobility task Assist level Comments    Bed mobility Min A Semi supine>EOB  Min A needed for upright trunk   Transfer Min A Stand step t/f c RW EOB>recliner   Sit to stands transitions Min A From EOB level   Functional mobility     Sitting balance     Standing balance      Other:        ADL Training:    ADL Assist level Comments    Feeding     Grooming/hygiene     Bathing     Upper body dressing     Lower body dressing Max A To don B socks and footwear   Toileting     Toilet transfer     Adaptive equipment training     Other:           Additional Comments:  Pt agreeable to sit UIC.    Assessment: Patient tolerated session fairly well. Pt agreeable to sit UIC only this session. Anticipated d/c today c hospice. Pt would continue to benefit from skilled OT services to improve pt's safety and independence with daily occupations, decrease caregiver burden, and reduce pt's risk of falls.      GOALS:   Multidisciplinary Problems       Occupational Therapy Goals          Problem: Occupational Therapy     Goal Priority Disciplines Outcome Interventions   Occupational Therapy Goal     OT, PT/OT Progressing    Description: Goals to be met by: 10/11/2024     Patient will increase functional independence with ADLs by performing:    UE Dressing with Set-up Assistance.  LE Dressing with Contact Guard Assistance c use of AE PRN.  Grooming while seated at sink with Set-up Assistance.  Toileting from toilet with Minimal Assistance for hygiene and clothing management.   Toilet transfer to toilet with Contact Guard Assistance.                         Recommendations:     Discharge Equipment Recommendations:  none     Plan:     Patient to be seen  (5-6x/week (QD-BID)) to address the above listed problems via self-care/home management, therapeutic activities, therapeutic exercises  Plan of Care Expires: 10/11/24  Plan of Care Reviewed with: patient  Revisions made to plan of care: No      Skilled OT Minutes Provided: 8  Communication with Treatment Team:     Equipment recommendations:       At end of treatment, patient remained:  X Up in chair     Up in wheelchair in room    In bed   X With alarm activated    Bed rails up   X Call bell in reach     Family/friends present    Restraints secured properly    In bathroom with CNA/RN notified    In gym with PT/PTA/tech    Nurse aware    Other:

## 2024-09-17 NOTE — PLAN OF CARE
11:05 am- Spoke with [facility] with [facility] who stated that everything is set up for patient to discharge to their services today. They are just waiting on the DME to be delivered which should be within the next 1.5 to 2hrs. She stated that they spoke with patient's daughter and they are all ready for patient to discharge home with their services.    11:07am-spoke with patient's daughter, Blessing, concerning discharge home with hospice today. She stated she is ready for patient to discharge home today as soon as the equipment is delivered. I explained that equipment will be delivered within 1.5 to 2 hours according to Heber with hospice. She verbalized understanding. Will set up discharge today with [facility] services

## 2024-09-17 NOTE — PT/OT/SLP DISCHARGE
Speech Language Pathology Discharge Summary    Baudilio Mantilla  MRN: [id number]   Acute on chronic systolic heart failure     Date of Last Treatment Session: 9/13/24    No past medical history on file.    Status at initiation of therapy: oropharyngeal dysphagia    Treatment Area(s):  Swallow    Goals:   Multidisciplinary Problems       SLP Goals       Not on file              Multidisciplinary Problems (Resolved)          Problem: SLP    Goal Priority Disciplines Outcome   SLP Goal   (Resolved)     SLP Met   Description: Short Term   1. Pt will complete URIEL and PSE with SUP cues and improved swallow function on 80% trials.   2. Pt will complete mildly thick liquid trials without chin tuck on 5/5 trials with no overt signs/sx aspiration or penetration.   3 Pt will utilize swallow strategies with SUP level on 5/5 trials.     Long Term   1. Pt will tolerate least restrictive diet with no overt signs/sx aspiration or penetration.                        Participation in Treatment (at discharge):  Fair-good participation initially, then refusing tx and requesting hospice consult. Per MD note from Sunday and nurse's report, Patient refusing recommended diet (minced and moist and mildly thick liquids), therefore MD cleared Patient to consume thin liquids and food of choice. Patient was made aware of diet recommendations and aspiration risk by SLP and MD. Patient and family verbalized understanding of risks. Patient and family wanting comfort measures, therefore Patient is discharging home w/ hospice level care this date.    Functional Status at time of Discharge:    Cognition: Patient demonstrates no cognitive deficits.    Communication: Patient demonstrates no communication deficits.    Language: Patient demonstrates no language deficits.    Motor Speech: Patient demonstrates no motor speech deficits.    Swallow: Patient demonstrates moderate dysphagia.                     Clinical Bedside assessment was completed on  9/12/24                       Instrumental assessment was completed on 9/12/24                                Swallowing Guidelines: Patient tolerating  nectar liquids and minced and moist and Patient at risk for adequate nutrition/hydration. Per MBSS recommendations, Patient is to utilize chin tuck across meals, no straws, upright position, assistance w/ thickening liquids, meds crushed in puree.                      Patient is discharged to  home w/ hospice.                 Nithya Berumen M.S., CCC-SLP

## 2024-09-17 NOTE — PLAN OF CARE
Problem: Adult Inpatient Plan of Care  Goal: Plan of Care Review  Outcome: Progressing  Flowsheets (Taken 9/17/2024 0753)  Plan of Care Reviewed With: patient  Goal: Patient-Specific Goal (Individualized)  Outcome: Progressing  Flowsheets (Taken 9/17/2024 0753)  Individualized Care Needs: wound care, dishcharge planning, PT/OT, contact and aspiration precautions  Anxieties, Fears or Concerns: none expressed at this time     Problem: Infection  Goal: Absence of Infection Signs and Symptoms  Outcome: Progressing  Intervention: Prevent or Manage Infection  Flowsheets (Taken 9/17/2024 0753)  Fever Reduction/Comfort Measures:   lightweight bedding   lightweight clothing  Infection Management: aseptic technique maintained  Isolation Precautions:   precautions maintained   contact     Problem: Skin Injury Risk Increased  Goal: Skin Health and Integrity  Outcome: Progressing  Intervention: Optimize Skin Protection  Flowsheets (Taken 9/17/2024 0753)  Pressure Reduction Techniques:   frequent weight shift encouraged   sit time limited to 2 hours   positioned off wounds  Pressure Reduction Devices: positioning supports utilized  Skin Protection: incontinence pads utilized  Activity Management: Rolling - L1  Head of Bed (HOB) Positioning: HOB at 60 degrees  Intervention: Promote and Optimize Oral Intake  Flowsheets (Taken 9/17/2024 0753)  Oral Nutrition Promotion: rest periods promoted  Nutrition Interventions: meal set-up provided

## 2024-09-17 NOTE — DISCHARGE SUMMARY
[facility]  HOSPITAL MEDICINE - DISCHARGE SUMMARY    Patient Name: Baudilio Mantilla  MRN: [id number]  Admission Date: 9/10/2024  Discharge Date: 09/17/2024  Hospital Length of Stay: 7 days  Discharge Provider: RACHEL Awan  Primary Care Provider: Saman Kearns II, MD    HOSPITAL COURSE     89-year-old female with a past medical history of CHF, AFib, HTN, HLD a CKD, hypothyroidism, gout, arthritis, vitamin-D deficiency who presented to [facility] on 09/04/2024 acute on chronic HFrEF.  BNP was elevated on arrival CXR showed cardiomegaly.  She initially required supplemental oxygen.  Echo obtained showed EF 40-45% and diastolic dysfunction.  Patient was evaluated by Cardiology who initially had patient on IV Lasix and switch to home Bumex and spironolactone.  Eliquis was decreased to 2.5 mg b.i.d. due to age and renal function.  Patient was also initiated on metoprolol succinate 2.5 mg daily.  Patient continued to complain arthritic pain.  Patient was initiated on colchicine on 09/06/2024.  She was discharged on prednisone 20 mg daily for 3 days.  [facility] for [facility].  Labs yesterday revealed BUN 90, creatinine around baseline.  Repeat labs today showed BUN improved to 80 with creatinine also improved.  Patient continues to complain of arthritic pain, adding lidocaine patches as needed.  Patient will be receiving therapy evaluations today.      SLP changed diet to minced and moist with mildly thick liquids.  Patient not eating well with diet change. Patient continues to refuse PEG placement and willing to accept aspiration risk.     Gluteal wound culture grew MRSA and Enterococcus faecalis - suspect superficial swab was obtained as no drainage from wound.  No antibiotic treatment at this time at no evidence of acute infection.    Discussed hospice level care as it appears patient wants comfort measures only at this time. Patient requested  hospice evaluation. POA was agreeable with comfort measures only.  [facility] and [facility].  She will discharge home today with home care.    PHYSICAL EXAM     Most Recent Vital Signs:  Temp: 97.8 °F (36.6 °C) (09/17/24 1116)  Pulse: 63 (09/17/24 1116)  Resp: 18 (09/17/24 0743)  BP: (!) 153/78 (09/17/24 1116)  SpO2: (!) 93 % (09/17/24 1116)     GENERAL: In no acute distress, afebrile  HEENT: Atraumatic, normocephalic, moist mucous membranes   CHEST: Clear to auscultation bilaterally  HEART: S1, S2, no appreciable murmur  ABDOMEN: Soft, nontender, BS +  MSK: Warm, no lower extremity edema, no clubbing or cyanosis  NEUROLOGIC: Alert and oriented x4, moving all extremities with good strength   INTEGUMENTARY: See media for gluteal and vaginal areas, no active drainage from gluteal wound     DISCHARGE DIAGNOSIS     Acute on chronic HFrEF  Echo EF 40-45% and diastolic dysfunction  Continue home Bumex 2 mg daily, spironolactone 25 mg daily     Afib  HTN  Continue metoprolol succinate 12.5 mg daily  Continue Eliquis 2.5 mg b.i.d., decreased at previous facility due to age and renal function     CKD-stable  Monitor renal function closely with Bumex 2 mg daily and spironolactone 25 mg daily, tolerating      Gout, arthritis   Started on colchicine 0.6 mg daily on 09/06/2024  Continue prednisone 20 mg daily x3 days starting 09/11/2024  Left wrist x-ray 09/06/2024 showed inflammatory arthritis with degenerative arthritis at the triscaphe join  Lidocaine patch/Voltaren gel as needed     Vaginal lesions  Gluteal wound  Gluteal wound culture showing gram positive cocci - suspect superficial swab was obtained as no drainage from wound  Conservative management, defer antibiotics as no evidence of acute infection at this time      Dysphagia  Refusing PEG, high aspiration risk, educated on this  [facility] with [facility]     Hx of HLD, hypothyroidism, vitamin-D deficiency,  depression     Social History     Socioeconomic History    Marital status:    Tobacco Use    Smoking status: Never    Smokeless tobacco: Never   Substance and Sexual Activity    Alcohol use: Never    Drug use: Never    Sexual activity: Never     Social Determinants of Health     Financial Resource Strain: Low Risk  (9/11/2024)    Overall Financial Resource Strain (CARDIA)     Difficulty of Paying Living Expenses: Not hard at all   Food Insecurity: No Food Insecurity (9/11/2024)    Hunger Vital Sign     Worried About Running Out of Food in the Last Year: Never true     Ran Out of Food in the Last Year: Never true   Transportation Needs: No Transportation Needs (9/11/2024)    TRANSPORTATION NEEDS     Transportation : No   Physical Activity: Inactive (9/11/2024)    Exercise Vital Sign     Days of Exercise per Week: 0 days     Minutes of Exercise per Session: 0 min   Stress: No Stress Concern Present (9/11/2024)    [employer] - Occupational Stress Questionnaire     Feeling of Stress : Only a little   Housing Stability: Low Risk  (9/11/2024)    Housing Stability Vital Sign     Unable to Pay for Housing in the Last Year: No     Homeless in the Last Year: No     Patient's screen for food insecurity, housing instability, transportation needs, utility difficulties, and interpersonal safety. Social work consulted for discharge planning, communicating with the patient's insurance during admission, setting up any DME needed, setting up hospice services.   _____________________________________________________________________________    DISCHARGE MED REC     Current Discharge Medication List        CONTINUE these medications which have NOT CHANGED    Details   apixaban (ELIQUIS) 2.5 mg Tab Take 1 tablet (2.5 mg total) by mouth 2 (two) times daily.  Qty: 60 tablet, Refills: 5      bumetanide (BUMEX) 2 MG tablet Take 1 tablet (2 mg total) by mouth once daily.  Qty: 30 tablet, Refills: 11       colchicine (COLCRYS) 0.6 mg tablet Take 1 tablet (0.6 mg total) by mouth once daily.  Qty: 30 tablet, Refills: 11      methIMAzole (TAPAZOLE) 5 MG Tab 0.5 tablet po daily.  Qty: 15 tablet, Refills: 11    Associated Diagnoses: Hyperthyroidism      metoprolol succinate (TOPROL-XL) 25 MG 24 hr tablet Take 0.5 tablets (12.5 mg total) by mouth once daily.  Qty: 45 tablet, Refills: 3    Comments: .      pantoprazole (PROTONIX) 40 MG tablet Take 1 tablet (40 mg total) by mouth 2 (two) times daily.  Qty: 60 tablet, Refills: 11    Associated Diagnoses: Wellness examination      polyethylene glycol (GLYCOLAX) 17 gram PwPk Take 17 g by mouth daily as needed for Constipation.  Qty: 30 packet, Refills: 0      sertraline (ZOLOFT) 50 MG tablet Take 1 tablet (50 mg total) by mouth once daily.  Qty: 30 tablet, Refills: 11    Associated Diagnoses: Wellness examination      spironolactone (ALDACTONE) 25 MG tablet Take 1 tablet (25 mg total) by mouth once daily.  Qty: 30 tablet, Refills: 11    Comments: .  Associated Diagnoses: Hypertension, unspecified type      traMADoL (ULTRAM) 50 mg tablet Take 1 tablet (50 mg total) by mouth 2 (two) times daily as needed for Pain.  Qty: 30 tablet, Refills: 3    Comments: Quantity prescribed more than 7 day supply? Yes, quantity medically necessary           STOP taking these medications       predniSONE (DELTASONE) 20 MG tablet Comments:   Reason for Stopping:         nystatin (MYCOSTATIN) cream Comments:   Reason for Stopping:                CONSULTS     Consults (From admission, onward)          Status Ordering Provider     Inpatient consult to Social Work/Case Management  Once        Provider:  (Not yet assigned)    Acknowledged REYES, THAIRY G            FOLLOW UP      Follow-up Information       Saman Kearns II, MD Follow up.    Specialty: Internal Medicine  Contact information:  [facility] [place] [address] [phone]               [facility]     Specialty: Hospice and Palliative Medicine  Contact information:  [address] [place] [address] [address]  [place] [address]  [phone]                             DISCHARGE INSTRUCTIONS     Explained in detail to the patient about the discharge plan, medications, and follow-up visits. Pt understands and agrees with the treatment plan.  Discharged Condition: stable  Diet as tolerated  Activities as tolerated  Discharge to: Hospice/Home    TIME SPENT ON DISCHARGE     35 minutes    RACHEL Awan  Internal Medicine  Department of Hospital Medicine [facility]    This document was created using electronic dictation services.  Please excuse any errors that may have been made.  Contact me if any questions regarding documentation to clarify verbiage.

## 2024-09-17 NOTE — PLAN OF CARE
Problem: Adult Inpatient Plan of Care  Goal: Plan of Care Review  Outcome: Not Progressing  Flowsheets (Taken 9/16/2024 1902)  Plan of Care Reviewed With: patient  Goal: Patient-Specific Goal (Individualized)  Outcome: Not Progressing  Flowsheets (Taken 9/16/2024 1902)  Individualized Care Needs: Aspiration precuations, Wound care, Contact precautions, Encourage therapy  Anxieties, Fears or Concerns: None currently verbalized  Goal: Absence of Hospital-Acquired Illness or Injury  Outcome: Not Progressing  Intervention: Identify and Manage Fall Risk  Flowsheets (Taken 9/16/2024 1902)  Safety Promotion/Fall Prevention:   assistive device/personal item within reach   bed alarm set   instructed to call staff for mobility   lighting adjusted   medications reviewed   muscle strengthening facilitated   nonskid shoes/socks when out of bed   room near unit station   patient expresses understanding of fall risk and prevention  Intervention: Prevent Skin Injury  Flowsheets (Taken 9/16/2024 1902)  Body Position: sitting up in bed  Skin Protection:   incontinence pads utilized   skin sealant/moisture barrier applied   transparent dressing maintained  Device Skin Pressure Protection:   absorbent pad utilized/changed   positioning supports utilized   tubing/devices free from skin contact  Intervention: Prevent and Manage VTE (Venous Thromboembolism) Risk  Flowsheets (Taken 9/16/2024 1902)  VTE Prevention/Management:   bleeding risk assessed   bleeding precautions maintained   ROM (active) performed   fluids promoted  Intervention: Prevent Infection  Flowsheets (Taken 9/16/2024 1902)  Infection Prevention:   cohorting utilized   hand hygiene promoted   single patient room provided  Goal: Optimal Comfort and Wellbeing  Outcome: Not Progressing  Intervention: Monitor Pain and Promote Comfort  Flowsheets (Taken 9/16/2024 1902)  Pain Management Interventions:   diversional activity provided   pillow support provided   position  adjusted   quiet environment facilitated  Intervention: Provide Person-Centered Care  Flowsheets (Taken 9/16/2024 1902)  Trust Relationship/Rapport:   care explained   choices provided   questions answered   thoughts/feelings acknowledged     Problem: Infection  Goal: Absence of Infection Signs and Symptoms  Outcome: Not Progressing  Intervention: Prevent or Manage Infection  Flowsheets (Taken 9/16/2024 1902)  Fever Reduction/Comfort Measures:   lightweight bedding   lightweight clothing  Infection Management: aseptic technique maintained  Isolation Precautions:   contact   precautions maintained     Problem: Wound  Goal: Optimal Coping  Outcome: Not Progressing  Intervention: Support Patient and Family Response  Flowsheets (Taken 9/16/2024 1902)  Supportive Measures:   active listening utilized   goal-setting facilitated   self-care encouraged  Family/Support System Care: self-care encouraged  Goal: Optimal Functional Ability  Outcome: Not Progressing  Intervention: Optimize Functional Ability  Flowsheets (Taken 9/16/2024 1902)  Assistive Device Utilized: lift device  Activity Management: Rolling - L1  Activity Assistance Provided: lift team assistance  Goal: Absence of Infection Signs and Symptoms  Outcome: Not Progressing  Intervention: Prevent or Manage Infection  Flowsheets (Taken 9/16/2024 1902)  Fever Reduction/Comfort Measures:   lightweight bedding   lightweight clothing  Infection Management: aseptic technique maintained  Isolation Precautions:   contact   precautions maintained  Goal: Improved Oral Intake  Outcome: Not Progressing  Intervention: Promote and Optimize Oral Intake  Flowsheets (Taken 9/16/2024 1902)  Oral Nutrition Promotion:   calorie-dense foods provided   calorie-dense liquids provided   social interaction promoted   rest periods promoted   adaptive equipment use encouraged  Nutrition Interventions: food preferences provided  Goal: Optimal Pain Control and Function  Outcome: Not  Progressing  Intervention: Prevent or Manage Pain  Flowsheets (Taken 9/16/2024 1902)  Sleep/Rest Enhancement:   consistent schedule promoted   regular sleep/rest pattern promoted   relaxation techniques promoted  Pain Management Interventions:   diversional activity provided   pillow support provided   position adjusted   quiet environment facilitated  Goal: Skin Health and Integrity  Outcome: Not Progressing  Intervention: Optimize Skin Protection  Flowsheets (Taken 9/16/2024 1902)  Pressure Reduction Techniques:   frequent weight shift encouraged   pressure points protected   weight shift assistance provided  Pressure Reduction Devices: positioning supports utilized  Skin Protection:   incontinence pads utilized   skin sealant/moisture barrier applied   transparent dressing maintained  Activity Management: Rolling - L1  Head of Bed (HOB) Positioning: HOB at 30-45 degrees  Goal: Optimal Wound Healing  Outcome: Not Progressing  Intervention: Promote Wound Healing  Flowsheets (Taken 9/16/2024 1902)  Sleep/Rest Enhancement:   consistent schedule promoted   regular sleep/rest pattern promoted   relaxation techniques promoted     Problem: Skin Injury Risk Increased  Goal: Skin Health and Integrity  Outcome: Not Progressing  Intervention: Optimize Skin Protection  Flowsheets (Taken 9/16/2024 1902)  Pressure Reduction Techniques:   frequent weight shift encouraged   pressure points protected   weight shift assistance provided  Pressure Reduction Devices: positioning supports utilized  Skin Protection:   incontinence pads utilized   skin sealant/moisture barrier applied   transparent dressing maintained  Activity Management: Rolling - L1  Head of Bed (HOB) Positioning: HOB at 30-45 degrees  Intervention: Promote and Optimize Oral Intake  Flowsheets (Taken 9/16/2024 1902)  Oral Nutrition Promotion:   calorie-dense foods provided   calorie-dense liquids provided   social interaction promoted   rest periods promoted    adaptive equipment use encouraged  Nutrition Interventions: food preferences provided     Problem: Heart Failure  Goal: Optimal Cardiac Output  Outcome: Not Progressing  Intervention: Optimize Cardiac Output  Flowsheets (Taken 9/16/2024 1902)  Stabilization Measures: legs elevated  Environmental Support:   calm environment promoted   rest periods encouraged   environmental consistency promoted  Goal: Stable Heart Rate and Rhythm  Outcome: Not Progressing  Goal: Fluid and Electrolyte Balance  Outcome: Not Progressing  Intervention: Monitor and Manage Fluid and Electrolyte Balance  Flowsheets (Taken 9/16/2024 1902)  Fluid/Electrolyte Management: fluids provided     Problem: Fall Injury Risk  Goal: Absence of Fall and Fall-Related Injury  Outcome: Not Progressing  Intervention: Identify and Manage Contributors  Flowsheets (Taken 9/16/2024 1902)  Self-Care Promotion:   BADL personal objects within reach   BADL personal routines maintained   adaptive equipment use encouraged  Medication Review/Management: medications reviewed  Intervention: Promote Injury-Free Environment  Flowsheets (Taken 9/16/2024 1902)  Safety Promotion/Fall Prevention:   assistive device/personal item within reach   bed alarm set   instructed to call staff for mobility   lighting adjusted   medications reviewed   muscle strengthening facilitated   nonskid shoes/socks when out of bed   room near unit station   patient expresses understanding of fall risk and prevention

## 2024-09-17 NOTE — NURSING
[facility] transport setup for patient. ETA within the hour. Daughter, Blessing notified. [facility] with [facility] notified.

## 2024-09-17 NOTE — PLAN OF CARE
[facility]  Discharge Final Note    Primary Care Provider: Saman Kearns II, MD    Expected Discharge Date: 9/18/2024    Final Discharge Note (most recent)       Final Note - 09/17/24 1114          Final Note    Assessment Type Final Discharge Note     Anticipated Discharge Disposition Hospice/Home     What phone number can be called within the next 1-3 days to see how you are doing after discharge? [phone]     Hospital Resources/Appts/Education Provided Provided patient/caregiver with written discharge plan information        Post-Acute Status    Post-Acute Authorization Hospice     Hospice Status Set-up Complete/Auth obtained     Discharge Delays None known at this time                     Important Message from Medicare             Contact Info       Saman Kearns II, MD   Specialty: Internal Medicine   Relationship: PCP - General    [address]  [facility] [address]   Phone: [phone]       Next Steps: Follow up    [facility]   Specialty: Hospice and Palliative Medicine    [facility] [address] [facility] [address]  [facility] [address]   Phone: [phone]       Next Steps: Follow up

## 2024-09-17 NOTE — PLAN OF CARE
Problem: Adult Inpatient Plan of Care  Goal: Plan of Care Review  9/17/2024 1304 by Mounika Alas RN  Outcome: Met  9/17/2024 0753 by Mounika Alas RN  Outcome: Progressing  Flowsheets (Taken 9/17/2024 0753)  Plan of Care Reviewed With: patient  Goal: Patient-Specific Goal (Individualized)  9/17/2024 1304 by Mounika Alas RN  Outcome: Met  9/17/2024 0753 by Mounika Alas RN  Outcome: Progressing  Flowsheets (Taken 9/17/2024 0753)  Individualized Care Needs: wound care, dishcharge planning, PT/OT, contact and aspiration precautions  Anxieties, Fears or Concerns: none expressed at this time  Goal: Absence of Hospital-Acquired Illness or Injury  Outcome: Met  Goal: Optimal Comfort and Wellbeing  Outcome: Met  Goal: Readiness for Transition of Care  Outcome: Met     Problem: Infection  Goal: Absence of Infection Signs and Symptoms  9/17/2024 1304 by Mounika Alas RN  Outcome: Met  9/17/2024 0753 by Mounika Alas RN  Outcome: Progressing  Intervention: Prevent or Manage Infection  Flowsheets (Taken 9/17/2024 0753)  Fever Reduction/Comfort Measures:   lightweight bedding   lightweight clothing  Infection Management: aseptic technique maintained  Isolation Precautions:   precautions maintained   contact     Problem: Wound  Goal: Optimal Coping  Outcome: Met  Goal: Optimal Functional Ability  Outcome: Met  Goal: Absence of Infection Signs and Symptoms  Outcome: Met  Goal: Improved Oral Intake  Outcome: Met  Goal: Optimal Pain Control and Function  Outcome: Met  Goal: Skin Health and Integrity  Outcome: Met  Goal: Optimal Wound Healing  Outcome: Met     Problem: Skin Injury Risk Increased  Goal: Skin Health and Integrity  9/17/2024 1304 by Mounika Alas RN  Outcome: Met  9/17/2024 0753 by Mounika Alas RN  Outcome: Progressing  Intervention: Optimize Skin Protection  Flowsheets (Taken 9/17/2024 0753)  Pressure Reduction Techniques:   frequent weight shift encouraged   sit time limited to 2 hours   positioned off  wounds  Pressure Reduction Devices: positioning supports utilized  Skin Protection: incontinence pads utilized  Activity Management: Rolling - L1  Head of Bed (HOB) Positioning: HOB at 60 degrees  Intervention: Promote and Optimize Oral Intake  Flowsheets (Taken 2024 0753)  Oral Nutrition Promotion: rest periods promoted  Nutrition Interventions: meal set-up provided     Problem: Heart Failure  Goal: Optimal Cardiac Output  Outcome: Met  Goal: Stable Heart Rate and Rhythm  Outcome: Met  Goal: Fluid and Electrolyte Balance  Outcome: Met     Problem: Fall Injury Risk  Goal: Absence of Fall and Fall-Related Injury  Outcome: Met     Problem: Physical Therapy  Goal: Physical Therapy Goal  Description: Goals to be met by: 24     Patient will increase functional independence with mobility by performin. Supine to sit with Contact Guard Assistance  2. Sit to supine with Contact Guard Assistance  3. Rolling to Left and Right with Stand-by Assistance.  4. Sit to stand transfer with Contact Guard Assistance  5. Bed to chair transfer with Contact Guard Assistance using Rolling Walker  6. Gait  x 25 feet with Contact Guard Assistance using Rolling Walker.     Outcome: Met     Problem: Occupational Therapy  Goal: Occupational Therapy Goal  Description: Goals to be met by: 10/11/2024     Patient will increase functional independence with ADLs by performing:    UE Dressing with Set-up Assistance.  LE Dressing with Contact Guard Assistance c use of AE PRN.  Grooming while seated at sink with Set-up Assistance.  Toileting from toilet with Minimal Assistance for hygiene and clothing management.   Toilet transfer to toilet with Contact Guard Assistance.    Outcome: Met     Problem: SLP  Goal: SLP Goal  Description: Short Term   1. Pt will complete URIEL and PSE with SUP cues and improved swallow function on 80% trials.   2. Pt will complete mildly thick liquid trials without chin tuck on 5/5 trials with no overt signs/sx  aspiration or penetration.   3 Pt will utilize swallow strategies with SUP level on 5/5 trials.     Long Term   1. Pt will tolerate least restrictive diet with no overt signs/sx aspiration or penetration.   Outcome: Met

## 2024-09-19 ENCOUNTER — PATIENT OUTREACH (OUTPATIENT)
Dept: ADMINISTRATIVE | Facility: CLINIC | Age: 89
End: 2024-09-19
Payer: MEDICARE